# Patient Record
Sex: MALE | Race: WHITE | NOT HISPANIC OR LATINO | URBAN - METROPOLITAN AREA
[De-identification: names, ages, dates, MRNs, and addresses within clinical notes are randomized per-mention and may not be internally consistent; named-entity substitution may affect disease eponyms.]

---

## 2017-03-16 ENCOUNTER — OUTPATIENT (OUTPATIENT)
Dept: OUTPATIENT SERVICES | Facility: HOSPITAL | Age: 62
LOS: 1 days | Discharge: HOME | End: 2017-03-16

## 2017-03-28 ENCOUNTER — INPATIENT (INPATIENT)
Facility: HOSPITAL | Age: 62
LOS: 6 days | Discharge: NURSING, REHAB OR LONG-TERM CARE | End: 2017-04-04
Attending: THORACIC SURGERY (CARDIOTHORACIC VASCULAR SURGERY)

## 2017-04-21 ENCOUNTER — OUTPATIENT (OUTPATIENT)
Dept: OUTPATIENT SERVICES | Facility: HOSPITAL | Age: 62
LOS: 1 days | Discharge: HOME | End: 2017-04-21

## 2017-06-27 DIAGNOSIS — N18.6 END STAGE RENAL DISEASE: ICD-10-CM

## 2017-07-06 DIAGNOSIS — I25.10 ATHEROSCLEROTIC HEART DISEASE OF NATIVE CORONARY ARTERY WITHOUT ANGINA PECTORIS: ICD-10-CM

## 2017-07-06 DIAGNOSIS — Z01.818 ENCOUNTER FOR OTHER PREPROCEDURAL EXAMINATION: ICD-10-CM

## 2017-09-22 DIAGNOSIS — I27.2 OTHER SECONDARY PULMONARY HYPERTENSION: ICD-10-CM

## 2017-09-22 DIAGNOSIS — R00.0 TACHYCARDIA, UNSPECIFIED: ICD-10-CM

## 2017-09-22 DIAGNOSIS — Z86.73 PERSONAL HISTORY OF TRANSIENT ISCHEMIC ATTACK (TIA), AND CEREBRAL INFARCTION WITHOUT RESIDUAL DEFICITS: ICD-10-CM

## 2017-09-22 DIAGNOSIS — E21.3 HYPERPARATHYROIDISM, UNSPECIFIED: ICD-10-CM

## 2017-09-22 DIAGNOSIS — R73.9 HYPERGLYCEMIA, UNSPECIFIED: ICD-10-CM

## 2017-09-22 DIAGNOSIS — I95.9 HYPOTENSION, UNSPECIFIED: ICD-10-CM

## 2017-09-22 DIAGNOSIS — G47.33 OBSTRUCTIVE SLEEP APNEA (ADULT) (PEDIATRIC): ICD-10-CM

## 2017-09-22 DIAGNOSIS — I35.0 NONRHEUMATIC AORTIC (VALVE) STENOSIS: ICD-10-CM

## 2017-09-22 DIAGNOSIS — I07.1 RHEUMATIC TRICUSPID INSUFFICIENCY: ICD-10-CM

## 2017-09-22 DIAGNOSIS — N18.6 END STAGE RENAL DISEASE: ICD-10-CM

## 2017-09-22 DIAGNOSIS — D62 ACUTE POSTHEMORRHAGIC ANEMIA: ICD-10-CM

## 2017-09-22 DIAGNOSIS — E87.5 HYPERKALEMIA: ICD-10-CM

## 2017-09-22 DIAGNOSIS — R06.89 OTHER ABNORMALITIES OF BREATHING: ICD-10-CM

## 2017-09-22 DIAGNOSIS — I73.9 PERIPHERAL VASCULAR DISEASE, UNSPECIFIED: ICD-10-CM

## 2017-09-22 DIAGNOSIS — Z87.891 PERSONAL HISTORY OF NICOTINE DEPENDENCE: ICD-10-CM

## 2017-09-22 DIAGNOSIS — I34.0 NONRHEUMATIC MITRAL (VALVE) INSUFFICIENCY: ICD-10-CM

## 2017-09-22 DIAGNOSIS — M54.89 OTHER DORSALGIA: ICD-10-CM

## 2017-09-22 DIAGNOSIS — I12.0 HYPERTENSIVE CHRONIC KIDNEY DISEASE WITH STAGE 5 CHRONIC KIDNEY DISEASE OR END STAGE RENAL DISEASE: ICD-10-CM

## 2017-09-22 DIAGNOSIS — Z99.2 DEPENDENCE ON RENAL DIALYSIS: ICD-10-CM

## 2017-09-22 DIAGNOSIS — M19.90 UNSPECIFIED OSTEOARTHRITIS, UNSPECIFIED SITE: ICD-10-CM

## 2017-09-22 DIAGNOSIS — M25.519 PAIN IN UNSPECIFIED SHOULDER: ICD-10-CM

## 2017-09-22 DIAGNOSIS — E66.01 MORBID (SEVERE) OBESITY DUE TO EXCESS CALORIES: ICD-10-CM

## 2017-09-22 DIAGNOSIS — D63.1 ANEMIA IN CHRONIC KIDNEY DISEASE: ICD-10-CM

## 2017-12-19 PROBLEM — Z00.00 ENCOUNTER FOR PREVENTIVE HEALTH EXAMINATION: Status: ACTIVE | Noted: 2017-12-19

## 2018-01-10 ENCOUNTER — OUTPATIENT (OUTPATIENT)
Dept: OUTPATIENT SERVICES | Facility: HOSPITAL | Age: 63
LOS: 1 days | Discharge: HOME | End: 2018-01-10

## 2018-01-10 DIAGNOSIS — Z41.8 ENCOUNTER FOR OTHER PROCEDURES FOR PURPOSES OTHER THAN REMEDYING HEALTH STATE: ICD-10-CM

## 2018-01-10 DIAGNOSIS — I35.1 NONRHEUMATIC AORTIC (VALVE) INSUFFICIENCY: ICD-10-CM

## 2018-01-10 DIAGNOSIS — R78.81 BACTEREMIA: ICD-10-CM

## 2018-01-19 ENCOUNTER — OUTPATIENT (OUTPATIENT)
Dept: OUTPATIENT SERVICES | Facility: HOSPITAL | Age: 63
LOS: 1 days | Discharge: HOME | End: 2018-01-19

## 2018-01-19 DIAGNOSIS — I35.1 NONRHEUMATIC AORTIC (VALVE) INSUFFICIENCY: ICD-10-CM

## 2018-01-19 DIAGNOSIS — B95.61 METHICILLIN SUSCEPTIBLE STAPHYLOCOCCUS AUREUS INFECTION AS THE CAUSE OF DISEASES CLASSIFIED ELSEWHERE: ICD-10-CM

## 2018-03-02 ENCOUNTER — OUTPATIENT (OUTPATIENT)
Dept: OUTPATIENT SERVICES | Facility: HOSPITAL | Age: 63
LOS: 1 days | Discharge: HOME | End: 2018-03-02

## 2018-03-02 DIAGNOSIS — Z41.8 ENCOUNTER FOR OTHER PROCEDURES FOR PURPOSES OTHER THAN REMEDYING HEALTH STATE: ICD-10-CM

## 2018-03-02 DIAGNOSIS — L02.91 CUTANEOUS ABSCESS, UNSPECIFIED: ICD-10-CM

## 2018-03-07 ENCOUNTER — APPOINTMENT (OUTPATIENT)
Dept: PLASTIC SURGERY | Facility: CLINIC | Age: 63
End: 2018-03-07

## 2018-03-09 ENCOUNTER — OUTPATIENT (OUTPATIENT)
Dept: OUTPATIENT SERVICES | Facility: HOSPITAL | Age: 63
LOS: 1 days | Discharge: HOME | End: 2018-03-09

## 2018-03-09 DIAGNOSIS — R71.8 OTHER ABNORMALITY OF RED BLOOD CELLS: ICD-10-CM

## 2018-03-14 ENCOUNTER — OUTPATIENT (OUTPATIENT)
Dept: OUTPATIENT SERVICES | Facility: HOSPITAL | Age: 63
LOS: 1 days | Discharge: HOME | End: 2018-03-14

## 2018-03-14 DIAGNOSIS — Z41.8 ENCOUNTER FOR OTHER PROCEDURES FOR PURPOSES OTHER THAN REMEDYING HEALTH STATE: ICD-10-CM

## 2018-03-14 DIAGNOSIS — R78.81 BACTEREMIA: ICD-10-CM

## 2018-03-19 ENCOUNTER — OUTPATIENT (OUTPATIENT)
Dept: OUTPATIENT SERVICES | Facility: HOSPITAL | Age: 63
LOS: 1 days | Discharge: HOME | End: 2018-03-19

## 2018-03-19 DIAGNOSIS — B96.89 OTHER SPECIFIED BACTERIAL AGENTS AS THE CAUSE OF DISEASES CLASSIFIED ELSEWHERE: ICD-10-CM

## 2018-03-23 ENCOUNTER — OUTPATIENT (OUTPATIENT)
Dept: OUTPATIENT SERVICES | Facility: HOSPITAL | Age: 63
LOS: 1 days | Discharge: HOME | End: 2018-03-23

## 2018-03-23 DIAGNOSIS — R71.0 PRECIPITOUS DROP IN HEMATOCRIT: ICD-10-CM

## 2018-03-23 DIAGNOSIS — R53.83 OTHER FATIGUE: ICD-10-CM

## 2018-03-28 ENCOUNTER — OUTPATIENT (OUTPATIENT)
Dept: OUTPATIENT SERVICES | Facility: HOSPITAL | Age: 63
LOS: 1 days | Discharge: HOME | End: 2018-03-28

## 2018-03-29 DIAGNOSIS — B96.89 OTHER SPECIFIED BACTERIAL AGENTS AS THE CAUSE OF DISEASES CLASSIFIED ELSEWHERE: ICD-10-CM

## 2018-04-04 ENCOUNTER — OUTPATIENT (OUTPATIENT)
Dept: OUTPATIENT SERVICES | Facility: HOSPITAL | Age: 63
LOS: 1 days | Discharge: HOME | End: 2018-04-04

## 2018-04-04 DIAGNOSIS — B96.89 OTHER SPECIFIED BACTERIAL AGENTS AS THE CAUSE OF DISEASES CLASSIFIED ELSEWHERE: ICD-10-CM

## 2018-04-16 DIAGNOSIS — A49.9 BACTERIAL INFECTION, UNSPECIFIED: ICD-10-CM

## 2018-04-18 ENCOUNTER — OUTPATIENT (OUTPATIENT)
Dept: OUTPATIENT SERVICES | Facility: HOSPITAL | Age: 63
LOS: 1 days | Discharge: HOME | End: 2018-04-18

## 2018-04-18 DIAGNOSIS — N25.81 SECONDARY HYPERPARATHYROIDISM OF RENAL ORIGIN: ICD-10-CM

## 2018-04-25 ENCOUNTER — OUTPATIENT (OUTPATIENT)
Dept: OUTPATIENT SERVICES | Facility: HOSPITAL | Age: 63
LOS: 1 days | Discharge: HOME | End: 2018-04-25

## 2018-04-26 DIAGNOSIS — B96.89 OTHER SPECIFIED BACTERIAL AGENTS AS THE CAUSE OF DISEASES CLASSIFIED ELSEWHERE: ICD-10-CM

## 2018-05-16 ENCOUNTER — OUTPATIENT (OUTPATIENT)
Dept: OUTPATIENT SERVICES | Facility: HOSPITAL | Age: 63
LOS: 1 days | Discharge: HOME | End: 2018-05-16

## 2018-05-16 DIAGNOSIS — B96.89 OTHER SPECIFIED BACTERIAL AGENTS AS THE CAUSE OF DISEASES CLASSIFIED ELSEWHERE: ICD-10-CM

## 2018-05-30 ENCOUNTER — OUTPATIENT (OUTPATIENT)
Dept: OUTPATIENT SERVICES | Facility: HOSPITAL | Age: 63
LOS: 1 days | Discharge: HOME | End: 2018-05-30

## 2018-05-30 DIAGNOSIS — B96.89 OTHER SPECIFIED BACTERIAL AGENTS AS THE CAUSE OF DISEASES CLASSIFIED ELSEWHERE: ICD-10-CM

## 2018-08-17 ENCOUNTER — OUTPATIENT (OUTPATIENT)
Dept: OUTPATIENT SERVICES | Facility: HOSPITAL | Age: 63
LOS: 1 days | Discharge: HOME | End: 2018-08-17

## 2018-08-17 DIAGNOSIS — E87.5 HYPERKALEMIA: ICD-10-CM

## 2018-09-12 ENCOUNTER — OUTPATIENT (OUTPATIENT)
Dept: OUTPATIENT SERVICES | Facility: HOSPITAL | Age: 63
LOS: 1 days | Discharge: HOME | End: 2018-09-12

## 2018-09-12 DIAGNOSIS — T82.7XXA INFECTION AND INFLAMMATORY REACTION DUE TO OTHER CARDIAC AND VASCULAR DEVICES, IMPLANTS AND GRAFTS, INITIAL ENCOUNTER: ICD-10-CM

## 2018-11-09 ENCOUNTER — OUTPATIENT (OUTPATIENT)
Dept: OUTPATIENT SERVICES | Facility: HOSPITAL | Age: 63
LOS: 1 days | Discharge: HOME | End: 2018-11-09

## 2018-11-09 DIAGNOSIS — E87.5 HYPERKALEMIA: ICD-10-CM

## 2018-12-10 ENCOUNTER — OUTPATIENT (OUTPATIENT)
Dept: OUTPATIENT SERVICES | Facility: HOSPITAL | Age: 63
LOS: 1 days | Discharge: HOME | End: 2018-12-10

## 2018-12-11 DIAGNOSIS — T82.7XXA INFECTION AND INFLAMMATORY REACTION DUE TO OTHER CARDIAC AND VASCULAR DEVICES, IMPLANTS AND GRAFTS, INITIAL ENCOUNTER: ICD-10-CM

## 2018-12-26 ENCOUNTER — OUTPATIENT (OUTPATIENT)
Dept: OUTPATIENT SERVICES | Facility: HOSPITAL | Age: 63
LOS: 1 days | Discharge: HOME | End: 2018-12-26

## 2018-12-26 DIAGNOSIS — L02.213 CUTANEOUS ABSCESS OF CHEST WALL: ICD-10-CM

## 2019-01-02 ENCOUNTER — OUTPATIENT (OUTPATIENT)
Dept: OUTPATIENT SERVICES | Facility: HOSPITAL | Age: 64
LOS: 1 days | Discharge: HOME | End: 2019-01-02

## 2019-01-02 DIAGNOSIS — L02.213 CUTANEOUS ABSCESS OF CHEST WALL: ICD-10-CM

## 2019-02-01 ENCOUNTER — OUTPATIENT (OUTPATIENT)
Dept: OUTPATIENT SERVICES | Facility: HOSPITAL | Age: 64
LOS: 1 days | Discharge: HOME | End: 2019-02-01

## 2019-02-04 DIAGNOSIS — D64.9 ANEMIA, UNSPECIFIED: ICD-10-CM

## 2019-02-13 ENCOUNTER — OUTPATIENT (OUTPATIENT)
Dept: OUTPATIENT SERVICES | Facility: HOSPITAL | Age: 64
LOS: 1 days | Discharge: HOME | End: 2019-02-13

## 2019-02-13 DIAGNOSIS — L02.213 CUTANEOUS ABSCESS OF CHEST WALL: ICD-10-CM

## 2019-02-14 LAB — GENTAMICIN SERPL-MCNC: 3.9 — SIGNIFICANT CHANGE UP

## 2019-02-18 ENCOUNTER — OUTPATIENT (OUTPATIENT)
Dept: OUTPATIENT SERVICES | Facility: HOSPITAL | Age: 64
LOS: 1 days | Discharge: HOME | End: 2019-02-18

## 2019-02-18 DIAGNOSIS — E71.41 PRIMARY CARNITINE DEFICIENCY: ICD-10-CM

## 2019-02-19 ENCOUNTER — APPOINTMENT (OUTPATIENT)
Dept: CARDIOLOGY | Facility: CLINIC | Age: 64
End: 2019-02-19
Payer: MEDICARE

## 2019-02-19 VITALS
WEIGHT: 215 LBS | HEIGHT: 73 IN | DIASTOLIC BLOOD PRESSURE: 75 MMHG | BODY MASS INDEX: 28.49 KG/M2 | SYSTOLIC BLOOD PRESSURE: 130 MMHG

## 2019-02-19 DIAGNOSIS — N18.4 CHRONIC KIDNEY DISEASE, STAGE 4 (SEVERE): ICD-10-CM

## 2019-02-19 DIAGNOSIS — Z87.891 PERSONAL HISTORY OF NICOTINE DEPENDENCE: ICD-10-CM

## 2019-02-19 DIAGNOSIS — I38 ENDOCARDITIS, VALVE UNSPECIFIED: ICD-10-CM

## 2019-02-19 DIAGNOSIS — I10 ESSENTIAL (PRIMARY) HYPERTENSION: ICD-10-CM

## 2019-02-19 DIAGNOSIS — Z95.2 PRESENCE OF PROSTHETIC HEART VALVE: ICD-10-CM

## 2019-02-19 DIAGNOSIS — Z78.9 OTHER SPECIFIED HEALTH STATUS: ICD-10-CM

## 2019-02-19 DIAGNOSIS — I50.30 ENDOCARDITIS, VALVE UNSPECIFIED: ICD-10-CM

## 2019-02-19 PROCEDURE — 99213 OFFICE O/P EST LOW 20 MIN: CPT

## 2019-02-19 NOTE — ASSESSMENT
[FreeTextEntry1] : pt has mild chf but he is on hemodialysis\par  will get echo to check aortic valve function and lvef

## 2019-02-19 NOTE — REASON FOR VISIT
[Follow-Up - Clinic] : a clinic follow-up of [FreeTextEntry1] : c/o exetional dyspnoea\par  hx of bioprosthetic aortic valve 2 yrs ago\par  pt had  av fistula was infected on abx\par  daysi was neg for valve infectio\par

## 2019-02-20 ENCOUNTER — OUTPATIENT (OUTPATIENT)
Dept: OUTPATIENT SERVICES | Facility: HOSPITAL | Age: 64
LOS: 1 days | Discharge: HOME | End: 2019-02-20

## 2019-02-20 DIAGNOSIS — L02.213 CUTANEOUS ABSCESS OF CHEST WALL: ICD-10-CM

## 2019-02-25 ENCOUNTER — OUTPATIENT (OUTPATIENT)
Dept: OUTPATIENT SERVICES | Facility: HOSPITAL | Age: 64
LOS: 1 days | Discharge: HOME | End: 2019-02-25

## 2019-02-25 DIAGNOSIS — L02.213 CUTANEOUS ABSCESS OF CHEST WALL: ICD-10-CM

## 2019-02-27 ENCOUNTER — OUTPATIENT (OUTPATIENT)
Dept: OUTPATIENT SERVICES | Facility: HOSPITAL | Age: 64
LOS: 1 days | Discharge: HOME | End: 2019-02-27

## 2019-02-27 DIAGNOSIS — L02.213 CUTANEOUS ABSCESS OF CHEST WALL: ICD-10-CM

## 2019-03-01 ENCOUNTER — INPATIENT (INPATIENT)
Facility: HOSPITAL | Age: 64
LOS: 7 days | End: 2019-03-09
Attending: INTERNAL MEDICINE | Admitting: INTERNAL MEDICINE

## 2019-03-01 VITALS
SYSTOLIC BLOOD PRESSURE: 103 MMHG | HEIGHT: 73 IN | TEMPERATURE: 97 F | HEART RATE: 96 BPM | RESPIRATION RATE: 20 BRPM | WEIGHT: 214.95 LBS | DIASTOLIC BLOOD PRESSURE: 64 MMHG | OXYGEN SATURATION: 94 %

## 2019-03-01 DIAGNOSIS — R65.21 SEVERE SEPSIS WITH SEPTIC SHOCK: ICD-10-CM

## 2019-03-01 DIAGNOSIS — N25.81 SECONDARY HYPERPARATHYROIDISM OF RENAL ORIGIN: ICD-10-CM

## 2019-03-01 DIAGNOSIS — I95.9 HYPOTENSION, UNSPECIFIED: ICD-10-CM

## 2019-03-01 DIAGNOSIS — E87.70 FLUID OVERLOAD, UNSPECIFIED: ICD-10-CM

## 2019-03-01 DIAGNOSIS — Z88.0 ALLERGY STATUS TO PENICILLIN: ICD-10-CM

## 2019-03-01 DIAGNOSIS — Z95.2 PRESENCE OF PROSTHETIC HEART VALVE: ICD-10-CM

## 2019-03-01 DIAGNOSIS — J90 PLEURAL EFFUSION, NOT ELSEWHERE CLASSIFIED: ICD-10-CM

## 2019-03-01 DIAGNOSIS — A41.9 SEPSIS, UNSPECIFIED ORGANISM: ICD-10-CM

## 2019-03-01 DIAGNOSIS — Z78.1 PHYSICAL RESTRAINT STATUS: ICD-10-CM

## 2019-03-01 DIAGNOSIS — E87.5 HYPERKALEMIA: ICD-10-CM

## 2019-03-01 DIAGNOSIS — Z95.2 PRESENCE OF PROSTHETIC HEART VALVE: Chronic | ICD-10-CM

## 2019-03-01 DIAGNOSIS — R74.8 ABNORMAL LEVELS OF OTHER SERUM ENZYMES: ICD-10-CM

## 2019-03-01 DIAGNOSIS — J96.02 ACUTE RESPIRATORY FAILURE WITH HYPERCAPNIA: ICD-10-CM

## 2019-03-01 DIAGNOSIS — R06.02 SHORTNESS OF BREATH: ICD-10-CM

## 2019-03-01 DIAGNOSIS — Z99.2 DEPENDENCE ON RENAL DIALYSIS: ICD-10-CM

## 2019-03-01 DIAGNOSIS — D53.9 NUTRITIONAL ANEMIA, UNSPECIFIED: ICD-10-CM

## 2019-03-01 DIAGNOSIS — N18.6 END STAGE RENAL DISEASE: ICD-10-CM

## 2019-03-01 LAB
ALBUMIN SERPL ELPH-MCNC: 3.6 G/DL — SIGNIFICANT CHANGE UP (ref 3.5–5.2)
ALP SERPL-CCNC: 98 U/L — SIGNIFICANT CHANGE UP (ref 30–115)
ALT FLD-CCNC: 6 U/L — SIGNIFICANT CHANGE UP (ref 0–41)
ANION GAP SERPL CALC-SCNC: 13 MMOL/L — SIGNIFICANT CHANGE UP (ref 7–14)
APTT BLD: 41.1 SEC — HIGH (ref 27–39.2)
AST SERPL-CCNC: 16 U/L — SIGNIFICANT CHANGE UP (ref 0–41)
BASE EXCESS BLDV CALC-SCNC: -4.1 MMOL/L — LOW (ref -2–2)
BASOPHILS # BLD AUTO: 0.03 K/UL — SIGNIFICANT CHANGE UP (ref 0–0.2)
BASOPHILS NFR BLD AUTO: 0.5 % — SIGNIFICANT CHANGE UP (ref 0–1)
BILIRUB SERPL-MCNC: 0.4 MG/DL — SIGNIFICANT CHANGE UP (ref 0.2–1.2)
BUN SERPL-MCNC: 17 MG/DL — SIGNIFICANT CHANGE UP (ref 10–20)
CA-I SERPL-SCNC: 1.41 MMOL/L — HIGH (ref 1.12–1.3)
CALCIUM SERPL-MCNC: 10.5 MG/DL — HIGH (ref 8.5–10.1)
CHLORIDE SERPL-SCNC: 97 MMOL/L — LOW (ref 98–110)
CO2 SERPL-SCNC: 25 MMOL/L — SIGNIFICANT CHANGE UP (ref 17–32)
CREAT SERPL-MCNC: 5.9 MG/DL — CRITICAL HIGH (ref 0.7–1.5)
EOSINOPHIL # BLD AUTO: 0.22 K/UL — SIGNIFICANT CHANGE UP (ref 0–0.7)
EOSINOPHIL NFR BLD AUTO: 3.3 % — SIGNIFICANT CHANGE UP (ref 0–8)
GAS PNL BLDV: 135 MMOL/L — LOW (ref 136–145)
GAS PNL BLDV: SIGNIFICANT CHANGE UP
GLUCOSE SERPL-MCNC: 99 MG/DL — SIGNIFICANT CHANGE UP (ref 70–99)
HCO3 BLDV-SCNC: 26 MMOL/L — SIGNIFICANT CHANGE UP (ref 22–29)
HCT VFR BLD CALC: 45.8 % — SIGNIFICANT CHANGE UP (ref 42–52)
HCT VFR BLDA CALC: 44.4 % — HIGH (ref 34–44)
HGB BLD CALC-MCNC: 14.5 G/DL — SIGNIFICANT CHANGE UP (ref 14–18)
HGB BLD-MCNC: 13.8 G/DL — LOW (ref 14–18)
HOROWITZ INDEX BLDV+IHG-RTO: 21 — SIGNIFICANT CHANGE UP
IMM GRANULOCYTES NFR BLD AUTO: 0.2 % — SIGNIFICANT CHANGE UP (ref 0.1–0.3)
INR BLD: 1.16 RATIO — SIGNIFICANT CHANGE UP (ref 0.65–1.3)
LACTATE BLDV-MCNC: 0.8 MMOL/L — SIGNIFICANT CHANGE UP (ref 0.5–1.6)
LYMPHOCYTES # BLD AUTO: 1.19 K/UL — LOW (ref 1.2–3.4)
LYMPHOCYTES # BLD AUTO: 17.9 % — LOW (ref 20.5–51.1)
MCHC RBC-ENTMCNC: 30.1 G/DL — LOW (ref 32–37)
MCHC RBC-ENTMCNC: 30.3 PG — SIGNIFICANT CHANGE UP (ref 27–31)
MCV RBC AUTO: 100.4 FL — HIGH (ref 80–94)
MONOCYTES # BLD AUTO: 0.76 K/UL — HIGH (ref 0.1–0.6)
MONOCYTES NFR BLD AUTO: 11.4 % — HIGH (ref 1.7–9.3)
NEUTROPHILS # BLD AUTO: 4.45 K/UL — SIGNIFICANT CHANGE UP (ref 1.4–6.5)
NEUTROPHILS NFR BLD AUTO: 66.7 % — SIGNIFICANT CHANGE UP (ref 42.2–75.2)
NRBC # BLD: 0 /100 WBCS — SIGNIFICANT CHANGE UP (ref 0–0)
NT-PROBNP SERPL-SCNC: HIGH PG/ML (ref 0–300)
PCO2 BLDV: 69 MMHG — HIGH (ref 41–51)
PH BLDV: 7.18 — LOW (ref 7.26–7.43)
PLATELET # BLD AUTO: 127 K/UL — LOW (ref 130–400)
PO2 BLDV: 50 MMHG — HIGH (ref 20–40)
POTASSIUM BLDV-SCNC: 3.6 MMOL/L — SIGNIFICANT CHANGE UP (ref 3.3–5.6)
POTASSIUM SERPL-MCNC: 4.3 MMOL/L — SIGNIFICANT CHANGE UP (ref 3.5–5)
POTASSIUM SERPL-SCNC: 4.3 MMOL/L — SIGNIFICANT CHANGE UP (ref 3.5–5)
PROT SERPL-MCNC: 6.5 G/DL — SIGNIFICANT CHANGE UP (ref 6–8)
PROTHROM AB SERPL-ACNC: 13.3 SEC — HIGH (ref 9.95–12.87)
RBC # BLD: 4.56 M/UL — LOW (ref 4.7–6.1)
RBC # FLD: 16 % — HIGH (ref 11.5–14.5)
SAO2 % BLDV: 79 % — SIGNIFICANT CHANGE UP
SODIUM SERPL-SCNC: 135 MMOL/L — SIGNIFICANT CHANGE UP (ref 135–146)
TROPONIN T SERPL-MCNC: 0.09 NG/ML — CRITICAL HIGH
WBC # BLD: 6.66 K/UL — SIGNIFICANT CHANGE UP (ref 4.8–10.8)
WBC # FLD AUTO: 6.66 K/UL — SIGNIFICANT CHANGE UP (ref 4.8–10.8)

## 2019-03-01 RX ORDER — SODIUM CHLORIDE 9 MG/ML
500 INJECTION INTRAMUSCULAR; INTRAVENOUS; SUBCUTANEOUS ONCE
Qty: 0 | Refills: 0 | Status: COMPLETED | OUTPATIENT
Start: 2019-03-01 | End: 2019-03-01

## 2019-03-01 RX ORDER — CHLORHEXIDINE GLUCONATE 213 G/1000ML
1 SOLUTION TOPICAL
Qty: 0 | Refills: 0 | Status: DISCONTINUED | OUTPATIENT
Start: 2019-03-01 | End: 2019-03-09

## 2019-03-01 RX ORDER — HEPARIN SODIUM 5000 [USP'U]/ML
5000 INJECTION INTRAVENOUS; SUBCUTANEOUS EVERY 12 HOURS
Qty: 0 | Refills: 0 | Status: DISCONTINUED | OUTPATIENT
Start: 2019-03-01 | End: 2019-03-09

## 2019-03-01 RX ORDER — OXYCODONE HYDROCHLORIDE 5 MG/1
0 TABLET ORAL
Qty: 0 | Refills: 0 | COMMUNITY

## 2019-03-01 RX ORDER — OXYCODONE HYDROCHLORIDE 5 MG/1
15 TABLET ORAL
Qty: 0 | Refills: 0 | Status: DISCONTINUED | OUTPATIENT
Start: 2019-03-01 | End: 2019-03-01

## 2019-03-01 RX ADMIN — SODIUM CHLORIDE 500 MILLILITER(S): 9 INJECTION INTRAMUSCULAR; INTRAVENOUS; SUBCUTANEOUS at 22:05

## 2019-03-01 RX ADMIN — OXYCODONE HYDROCHLORIDE 15 MILLIGRAM(S): 5 TABLET ORAL at 21:59

## 2019-03-01 RX ADMIN — OXYCODONE HYDROCHLORIDE 15 MILLIGRAM(S): 5 TABLET ORAL at 21:30

## 2019-03-01 RX ADMIN — SODIUM CHLORIDE 1000 MILLILITER(S): 9 INJECTION INTRAMUSCULAR; INTRAVENOUS; SUBCUTANEOUS at 18:35

## 2019-03-01 NOTE — ED ADULT NURSE NOTE - NSIMPLEMENTINTERV_GEN_ALL_ED
Implemented All Fall with Harm Risk Interventions:  [place] to call system. Call bell, personal items and telephone within reach. Instruct patient to call for assistance. Room bathroom lighting operational. Non-slip footwear when patient is off stretcher. Physically safe environment: no spills, clutter or unnecessary equipment. Stretcher in lowest position, wheels locked, appropriate side rails in place. Provide visual cue, wrist band, yellow gown, etc. Monitor gait and stability. Monitor for mental status changes and reorient to person, place, and time. Review medications for side effects contributing to fall risk. Reinforce activity limits and safety measures with patient and family. Provide visual clues: red socks.

## 2019-03-01 NOTE — ED PROVIDER NOTE - CLINICAL SUMMARY MEDICAL DECISION MAKING FREE TEXT BOX
Pt presenting with shortness of breath that began shortly after HD two days PTA. Pt afebrile in ED. relative hypotension responded to fluid bolus trial. Fluid overload on XR. Additional fluids held. Troponin elevated and may be secondary to demand. BNP elevated consistent with CXR though likely also elevated at baseline. Pt does not make urine. Renal aware and plan for urgent dialysis. Pt comfortable on nasal canula. Admitted to telemetry. ABX held for afebrile patient with fluid overload as explanation for presenting symptoms.

## 2019-03-01 NOTE — ED PROVIDER NOTE - OBJECTIVE STATEMENT
63 year old M with pmhx of ESRD on HD M/W/F, AVR not on AC c/o generalized weakness and sob x 3 days. Pt sts he did not go to dialysis today because he felt too weak. Sob is exertional and improved with rest. Denies any cough, fever/chills, worsening leg swelling, chest pain, abdominal pain, n/v, diarrhea. Pts nephrologist is Dr. Davalos. Cardiologist: Dr. Montano.

## 2019-03-01 NOTE — H&P ADULT - NSHPLABSRESULTS_GEN_ALL_CORE
13.8   6.66  )-----------( 127      ( 01 Mar 2019 18:10 )             45.8     03-01    135  |  97<L>  |  17  ----------------------------<  99  4.3   |  25  |  5.9<HH>    Ca    10.5<H>      01 Mar 2019 18:10    TPro  6.5  /  Alb  3.6  /  TBili  0.4  /  DBili  x   /  AST  16  /  ALT  6   /  AlkPhos  98  03-01            PT/INR - ( 01 Mar 2019 20:58 )   PT: 13.30 sec;   INR: 1.16 ratio         PTT - ( 01 Mar 2019 20:58 )  PTT:41.1 sec  Lactate Trend    CARDIAC MARKERS ( 01 Mar 2019 18:10 )  x     / 0.09 ng/mL / x     / x     / x          CAPILLARY BLOOD GLUCOSE    CXR to me shows fluid overload    EKG - sinus, PVC, LAD, LVH, inferior infarct with age undetermined

## 2019-03-01 NOTE — H&P ADULT - HISTORY OF PRESENT ILLNESS
[age]yo male presents to the ER due to worsening shortness of breath. Notes that he was too sick to go for his scheduled dialysis today. He was hypotensive in the ER but improved with IV fluids. Denies fevers but reports he had been IV antibiotics for 1 year previously for infection involving left upper extremity Adds that he gets side effects from penicillin derivatives 64yo male presents to the ER due to worsening shortness of breath. Notes that he was too sick to go for his scheduled dialysis today. States he does not make urine. Patient was hypotensive in the ER but improved with IV fluids. Denies fevers but reports he had been IV antibiotics for 1 year previously for infection involving left upper extremity Adds that he gets side effects from penicillin derivatives

## 2019-03-01 NOTE — ED PROVIDER NOTE - NS ED ROS FT
Constitutional: no fever, chills, no recent weight loss, change in appetite or malaise  ENT: no rhinorrhea/ear pain/sore throat  Cardiac: No chest pain or edema.  Respiratory: + exertional sob. No cough or respiratory distress  GI: No nausea, vomiting, diarrhea or abdominal pain.  : No dysuria, frequency, urgency or hematuria  MS: no pain to back or extremities, no loss of ROM, no weakness  Extrem: no calf pain  Neuro: No headache or weakness. No LOC.  Skin: No skin rash.  Endocrine: No history of thyroid disease or diabetes.  Except as documented in the HPI, all other systems are negative.

## 2019-03-01 NOTE — ED PROVIDER NOTE - PHYSICAL EXAMINATION
CONSTITUTIONAL: Well-appearing; well-nourished; in no apparent distress.   NECK: Supple; non-tender; no cervical lymphadenopathy. No JVD.   CARDIOVASCULAR: Normal S1, S2; no murmurs, rubs, or gallops. Equal radial pulses  CHEST: no chest wall tenderness  RESPIRATORY: No respiratory distress. No tachypnea. Pt speaking full sentences. + crackles at rt base  GI/: Normal bowel sounds; non-distended; non-tender; no palpable organomegaly.   MS: No evidence of trauma or deformity Normal ROM in all four extremities; non-tender to palpation; distal pulses are normal.   Extrem: mild b/l non pitting edema (pt sts chronic)  SKIN: Normal for age and race; warm; dry; good turgor; no apparent lesions or exudate.   NEURO/PSYCH: A & O x 4; grossly unremarkable. mood and manner are appropriate. Grooming and personal hygiene are appropriate.

## 2019-03-01 NOTE — ED PROVIDER NOTE - ATTENDING CONTRIBUTION TO CARE
63M hx of ESRD on MWF last HD on W presenting with several days of generalized weakness, and progressive shortness of breath. no angina. +orthopnea. No fever or chills. Does not make urine. recently off abx for infected hd access.   VITAL SIGNS: noted  CONSTITUTIONAL: Well-developed; well-nourished; in no acute distress  HEAD: Normocephalic; atraumatic  EYES: conjunctiva and sclera clear  ENT: No nasal discharge; airway clear. MMM  NECK: Supple; non tender. No anterior cervical lymphadenopathy noted  CARD: Regular rate and rhythm  RESP: bl wheeze and rhonchi, hypoxic on ra. comfortable on non-compliant.   ABD: Normal bowel sounds; soft; non-distended; non-tender; No CVAT  EXT: Normal ROM. No calf tenderness or edema. Distal pulses intact  NEURO: Alert, oriented. Grossly unremarkable. No focal deficits  SKIN: Skin exam is warm and dry, no acute rash.   SOB - APE vs ACS vs PNA - labs, ekg, xr, prn 0-2

## 2019-03-01 NOTE — H&P ADULT - PROBLEM SELECTOR PLAN 2
suspect due to renal disease but patient has risk factors, repeat orderd suspect due to renal disease but patient has risk factors, repeat ordered and will ask cardiology to see suspect due to renal disease but patient has risk factors, repeat ordered and will ask cardiology to see (ALSO HAS AVR)

## 2019-03-01 NOTE — ED PROVIDER NOTE - CARE PLAN
Principal Discharge DX:	SOB (shortness of breath)  Secondary Diagnosis:	Weakness  Secondary Diagnosis:	Elevated troponin

## 2019-03-01 NOTE — H&P ADULT - PROBLEM SELECTOR PLAN 3
Patient does use midodrine but concerned that there may be underlying infectious process (see HPI). Will give vancomycin and gent (used recently) and ask ID to see

## 2019-03-01 NOTE — ED PEDIATRIC NURSE NOTE - NSIMPLEMENTINTERV_GEN_ALL_ED
Implemented All Universal Safety Interventions:  [place] to call system. Call bell, personal items and telephone within reach. Instruct patient to call for assistance. Room bathroom lighting operational. Non-slip footwear when patient is off stretcher. Physically safe environment: no spills, clutter or unnecessary equipment. Stretcher in lowest position, wheels locked, appropriate side rails in place.

## 2019-03-01 NOTE — H&P ADULT - EXTREMITIES COMMENTS
abnormal skin changes to both feet abnormal skin changes to both feet, scar over left shoulder area abnormal darkened skin changes to both feet with abnormal nail growth, scar over left shoulder area, small ulcer to buttock

## 2019-03-01 NOTE — ED ADULT NURSE REASSESSMENT NOTE - NS ED NURSE REASSESS COMMENT FT1
IV started as per pt request in right wrist.  Pt states he has a non working fistula in right upper arm that is going to be removed at the end of the month.

## 2019-03-02 DIAGNOSIS — R74.8 ABNORMAL LEVELS OF OTHER SERUM ENZYMES: ICD-10-CM

## 2019-03-02 DIAGNOSIS — L30.9 DERMATITIS, UNSPECIFIED: ICD-10-CM

## 2019-03-02 DIAGNOSIS — R06.02 SHORTNESS OF BREATH: ICD-10-CM

## 2019-03-02 DIAGNOSIS — I95.9 HYPOTENSION, UNSPECIFIED: ICD-10-CM

## 2019-03-02 DIAGNOSIS — N18.6 END STAGE RENAL DISEASE: ICD-10-CM

## 2019-03-02 LAB
ANION GAP SERPL CALC-SCNC: 14 MMOL/L — SIGNIFICANT CHANGE UP (ref 7–14)
ANION GAP SERPL CALC-SCNC: 3 MMOL/L — LOW (ref 7–14)
BASOPHILS # BLD AUTO: 0.03 K/UL — SIGNIFICANT CHANGE UP (ref 0–0.2)
BASOPHILS NFR BLD AUTO: 0.4 % — SIGNIFICANT CHANGE UP (ref 0–1)
BUN SERPL-MCNC: 13 MG/DL — SIGNIFICANT CHANGE UP (ref 10–20)
BUN SERPL-MCNC: 20 MG/DL — SIGNIFICANT CHANGE UP (ref 10–20)
CALCIUM SERPL-MCNC: 10 MG/DL — SIGNIFICANT CHANGE UP (ref 8.5–10.1)
CALCIUM SERPL-MCNC: 10.3 MG/DL — HIGH (ref 8.5–10.1)
CHLORIDE SERPL-SCNC: 97 MMOL/L — LOW (ref 98–110)
CHLORIDE SERPL-SCNC: 99 MMOL/L — SIGNIFICANT CHANGE UP (ref 98–110)
CK MB CFR SERPL CALC: 3.5 NG/ML — SIGNIFICANT CHANGE UP (ref 0.6–6.3)
CK MB CFR SERPL CALC: 3.8 NG/ML — SIGNIFICANT CHANGE UP (ref 0.6–6.3)
CK SERPL-CCNC: 19 U/L — SIGNIFICANT CHANGE UP (ref 0–225)
CK SERPL-CCNC: 23 U/L — SIGNIFICANT CHANGE UP (ref 0–225)
CO2 SERPL-SCNC: 27 MMOL/L — SIGNIFICANT CHANGE UP (ref 17–32)
CO2 SERPL-SCNC: 34 MMOL/L — HIGH (ref 17–32)
CREAT SERPL-MCNC: 5.1 MG/DL — CRITICAL HIGH (ref 0.7–1.5)
CREAT SERPL-MCNC: 6.3 MG/DL — CRITICAL HIGH (ref 0.7–1.5)
EOSINOPHIL # BLD AUTO: 0.13 K/UL — SIGNIFICANT CHANGE UP (ref 0–0.7)
EOSINOPHIL NFR BLD AUTO: 1.8 % — SIGNIFICANT CHANGE UP (ref 0–8)
GLUCOSE SERPL-MCNC: 113 MG/DL — HIGH (ref 70–99)
GLUCOSE SERPL-MCNC: 90 MG/DL — SIGNIFICANT CHANGE UP (ref 70–99)
HCT VFR BLD CALC: 44.7 % — SIGNIFICANT CHANGE UP (ref 42–52)
HCT VFR BLD CALC: 48.9 % — SIGNIFICANT CHANGE UP (ref 42–52)
HGB BLD-MCNC: 13.4 G/DL — LOW (ref 14–18)
HGB BLD-MCNC: 14.6 G/DL — SIGNIFICANT CHANGE UP (ref 14–18)
IMM GRANULOCYTES NFR BLD AUTO: 0.8 % — HIGH (ref 0.1–0.3)
LYMPHOCYTES # BLD AUTO: 0.76 K/UL — LOW (ref 1.2–3.4)
LYMPHOCYTES # BLD AUTO: 10.7 % — LOW (ref 20.5–51.1)
MCHC RBC-ENTMCNC: 29.9 G/DL — LOW (ref 32–37)
MCHC RBC-ENTMCNC: 30 G/DL — LOW (ref 32–37)
MCHC RBC-ENTMCNC: 30.5 PG — SIGNIFICANT CHANGE UP (ref 27–31)
MCHC RBC-ENTMCNC: 30.6 PG — SIGNIFICANT CHANGE UP (ref 27–31)
MCV RBC AUTO: 101.6 FL — HIGH (ref 80–94)
MCV RBC AUTO: 102.5 FL — HIGH (ref 80–94)
MONOCYTES # BLD AUTO: 0.79 K/UL — HIGH (ref 0.1–0.6)
MONOCYTES NFR BLD AUTO: 11.2 % — HIGH (ref 1.7–9.3)
NEUTROPHILS # BLD AUTO: 5.31 K/UL — SIGNIFICANT CHANGE UP (ref 1.4–6.5)
NEUTROPHILS NFR BLD AUTO: 75.1 % — SIGNIFICANT CHANGE UP (ref 42.2–75.2)
NRBC # BLD: 0 /100 WBCS — SIGNIFICANT CHANGE UP (ref 0–0)
NRBC # BLD: 0 /100 WBCS — SIGNIFICANT CHANGE UP (ref 0–0)
PHOSPHATE SERPL-MCNC: 8.1 MG/DL — HIGH (ref 2.1–4.9)
PLATELET # BLD AUTO: 124 K/UL — LOW (ref 130–400)
PLATELET # BLD AUTO: 141 K/UL — SIGNIFICANT CHANGE UP (ref 130–400)
POTASSIUM SERPL-MCNC: 3.9 MMOL/L — SIGNIFICANT CHANGE UP (ref 3.5–5)
POTASSIUM SERPL-MCNC: 4.2 MMOL/L — SIGNIFICANT CHANGE UP (ref 3.5–5)
POTASSIUM SERPL-SCNC: 3.9 MMOL/L — SIGNIFICANT CHANGE UP (ref 3.5–5)
POTASSIUM SERPL-SCNC: 4.2 MMOL/L — SIGNIFICANT CHANGE UP (ref 3.5–5)
RBC # BLD: 4.4 M/UL — LOW (ref 4.7–6.1)
RBC # BLD: 4.77 M/UL — SIGNIFICANT CHANGE UP (ref 4.7–6.1)
RBC # FLD: 16.5 % — HIGH (ref 11.5–14.5)
RBC # FLD: 16.6 % — HIGH (ref 11.5–14.5)
SODIUM SERPL-SCNC: 134 MMOL/L — LOW (ref 135–146)
SODIUM SERPL-SCNC: 140 MMOL/L — SIGNIFICANT CHANGE UP (ref 135–146)
TROPONIN T SERPL-MCNC: 0.08 NG/ML — CRITICAL HIGH
TROPONIN T SERPL-MCNC: 0.09 NG/ML — CRITICAL HIGH
TROPONIN T SERPL-MCNC: 0.1 NG/ML — CRITICAL HIGH
WBC # BLD: 11.38 K/UL — HIGH (ref 4.8–10.8)
WBC # BLD: 7.08 K/UL — SIGNIFICANT CHANGE UP (ref 4.8–10.8)
WBC # FLD AUTO: 11.38 K/UL — HIGH (ref 4.8–10.8)
WBC # FLD AUTO: 7.08 K/UL — SIGNIFICANT CHANGE UP (ref 4.8–10.8)

## 2019-03-02 RX ORDER — VANCOMYCIN HCL 1 G
1000 VIAL (EA) INTRAVENOUS ONCE
Qty: 0 | Refills: 0 | Status: COMPLETED | OUTPATIENT
Start: 2019-03-02 | End: 2019-03-02

## 2019-03-02 RX ORDER — MIDODRINE HYDROCHLORIDE 2.5 MG/1
10 TABLET ORAL ONCE
Qty: 0 | Refills: 0 | Status: COMPLETED | OUTPATIENT
Start: 2019-03-02 | End: 2019-03-02

## 2019-03-02 RX ORDER — MEROPENEM 1 G/30ML
1000 INJECTION INTRAVENOUS ONCE
Qty: 0 | Refills: 0 | Status: DISCONTINUED | OUTPATIENT
Start: 2019-03-02 | End: 2019-03-02

## 2019-03-02 RX ORDER — CALCIUM CARBONATE 500(1250)
1 TABLET ORAL THREE TIMES A DAY
Qty: 0 | Refills: 0 | Status: DISCONTINUED | OUTPATIENT
Start: 2019-03-02 | End: 2019-03-09

## 2019-03-02 RX ORDER — CALCIUM CARBONATE 500(1250)
500 TABLET ORAL THREE TIMES A DAY
Qty: 0 | Refills: 0 | Status: DISCONTINUED | OUTPATIENT
Start: 2019-03-02 | End: 2019-03-02

## 2019-03-02 RX ORDER — NOREPINEPHRINE BITARTRATE/D5W 8 MG/250ML
0.05 PLASTIC BAG, INJECTION (ML) INTRAVENOUS
Qty: 16 | Refills: 0 | Status: DISCONTINUED | OUTPATIENT
Start: 2019-03-02 | End: 2019-03-09

## 2019-03-02 RX ORDER — MIDODRINE HYDROCHLORIDE 2.5 MG/1
10 TABLET ORAL THREE TIMES A DAY
Qty: 0 | Refills: 0 | Status: DISCONTINUED | OUTPATIENT
Start: 2019-03-02 | End: 2019-03-09

## 2019-03-02 RX ORDER — DOPAMINE HYDROCHLORIDE 40 MG/ML
5 INJECTION, SOLUTION, CONCENTRATE INTRAVENOUS
Qty: 400 | Refills: 0 | Status: DISCONTINUED | OUTPATIENT
Start: 2019-03-02 | End: 2019-03-02

## 2019-03-02 RX ORDER — GENTAMICIN SULFATE 40 MG/ML
100 VIAL (ML) INJECTION ONCE
Qty: 0 | Refills: 0 | Status: COMPLETED | OUTPATIENT
Start: 2019-03-02 | End: 2019-03-02

## 2019-03-02 RX ORDER — GENTAMICIN SULFATE 40 MG/ML
100 VIAL (ML) INJECTION ONCE
Qty: 0 | Refills: 0 | Status: DISCONTINUED | OUTPATIENT
Start: 2019-03-02 | End: 2019-03-02

## 2019-03-02 RX ORDER — ACETAMINOPHEN 500 MG
650 TABLET ORAL EVERY 6 HOURS
Qty: 0 | Refills: 0 | Status: DISCONTINUED | OUTPATIENT
Start: 2019-03-02 | End: 2019-03-06

## 2019-03-02 RX ORDER — PANTOPRAZOLE SODIUM 20 MG/1
40 TABLET, DELAYED RELEASE ORAL
Qty: 0 | Refills: 0 | Status: DISCONTINUED | OUTPATIENT
Start: 2019-03-02 | End: 2019-03-04

## 2019-03-02 RX ADMIN — MIDODRINE HYDROCHLORIDE 10 MILLIGRAM(S): 2.5 TABLET ORAL at 05:20

## 2019-03-02 RX ADMIN — Medication 1 TABLET(S): at 15:02

## 2019-03-02 RX ADMIN — Medication 4.67 MICROGRAM(S)/KG/MIN: at 12:40

## 2019-03-02 RX ADMIN — Medication 250 MILLIGRAM(S): at 01:54

## 2019-03-02 RX ADMIN — MIDODRINE HYDROCHLORIDE 10 MILLIGRAM(S): 2.5 TABLET ORAL at 15:02

## 2019-03-02 RX ADMIN — MIDODRINE HYDROCHLORIDE 10 MILLIGRAM(S): 2.5 TABLET ORAL at 21:26

## 2019-03-02 RX ADMIN — HEPARIN SODIUM 5000 UNIT(S): 5000 INJECTION INTRAVENOUS; SUBCUTANEOUS at 18:17

## 2019-03-02 RX ADMIN — Medication 1 TABLET(S): at 21:27

## 2019-03-02 RX ADMIN — PANTOPRAZOLE SODIUM 40 MILLIGRAM(S): 20 TABLET, DELAYED RELEASE ORAL at 06:25

## 2019-03-02 RX ADMIN — Medication 200 MILLIGRAM(S): at 01:54

## 2019-03-02 RX ADMIN — DOPAMINE HYDROCHLORIDE 18.68 MICROGRAM(S)/KG/MIN: 40 INJECTION, SOLUTION, CONCENTRATE INTRAVENOUS at 02:07

## 2019-03-02 NOTE — CONSULT NOTE ADULT - ASSESSMENT
63M    ESRD on HD  H/O aortic valve replacement  HX AVF infection with pseudomonas (1/2/19- s/p vanc/gent but (I) gent, 4/2018, 3/2018), R fluor    Admitted with SOB after missing HD  SIRS on admission, sepsis ruled out T35.9 P96 WBC 6  Hypotension    *******  - f/u bcx  - monitor off abx for now, if any hemodynamic compromise, start cefepime 1g q24h 63M    ESRD on HD  H/O aortic valve replacement  HX AVF infection with pseudomonas (1/2/19- s/p vanc/gent but (I) gent, 4/2018, 3/2018), R fluor    Admitted with SOB after missing HD  SIRS on admission, sepsis ruled out T35.9 P96 WBC 6  systolic Hypotension  Completed recent vanc/gent course for pseudomonal infection (pseudo was I gent)    - f/u bcx  - monitor off abx for now, if any fever or hemodynamic compromise, start cefepime 1g q24h  - as sore throat, rhinorrhea would send RVP, CXR

## 2019-03-02 NOTE — CHART NOTE - NSCHARTNOTEFT_GEN_A_CORE
Blood pressure dropped to systolic in the 80's but patient just received midodrine, tells me he is having back pain (Informed that blood pressure too low for narcotic but will be reassess) If blood pressure not improving soon, will try levophed

## 2019-03-02 NOTE — CONSULT NOTE ADULT - ASSESSMENT
IMPRESSION:  ?? sepsis septic shock / hypotension   ESRD   history of graft infection pseudomonas   pulmonary edema fluid over load     PLAN:    CNS: no sedation     HEENT:  oral care   PULMONARY: keep pox > 92 %     CARDIOVASCULAR: start levophid SBP 80 and need to start HD   echo   cardiology follow up  keep IS < OS     GI: GI prophylaxis.  Feeding     RENAL: HD today follow renal and lytes keep Is < OS     INFECTIOUS DISEASE: off abx as per ID   follow cx for now     HEMATOLOGICAL:  DVT prophylaxis.    ENDOCRINE:  Follow up FS.  Insulin protocol if needed.    MUSCULOSKELETAL:    ICu Monitoring     CRITICAL CARE TIME SPENT: ***

## 2019-03-02 NOTE — CONSULT NOTE ADULT - SUBJECTIVE AND OBJECTIVE BOX
S :   63y year old Male seen at bedside  painful thickened, dystrophic, ingrowing  and long toenails digits 1-5 bilaterally  and preventative foot examination. Patient is medically managed  by Medicine.  Pt not alert at the moment. No wounds present.     Patient admits to  (-) Fevers, (-) Chills, (-) Nausea, (-) Vomiting, (-) Shortness of Breath (-) calf pain (-) chest pain       PMH: Bleeding ulcer  Dialysis patient  CKD (chronic kidney disease)  No pertinent past medical history    PSH:H/O aortic valve replacement  No significant past surgical history      Allergies:Gluten (Unknown)  naproxen (Other)  penicillin (Rash)      Labs:                        13.8   6.66  )-----------( 127      ( 01 Mar 2019 18:10 )             45.8       WBC Trend  6.66 Date (03-01 @ 18:10)      Chem  03-01    135  |  97<L>  |  17  ----------------------------<  99  4.3   |  25  |  5.9<HH>    Ca    10.5<H>      01 Mar 2019 18:10    TPro  6.5  /  Alb  3.6  /  TBili  0.4  /  DBili  x   /  AST  16  /  ALT  6   /  AlkPhos  98  03-01          T(F): 96.5 (03-02-19 @ 05:58), Max: 96.7 (03-01-19 @ 16:51)  HR: 85 (03-02-19 @ 06:26) (79 - 96)  BP: 95/55 (03-02-19 @ 06:26) (75/49 - 103/64)  RR: 16 (03-02-19 @ 05:58) (16 - 20)  SpO2: 98% (03-01-19 @ 21:38) (94% - 98%)  Wt(kg): --    O:   Derm: no open ulceration. Red/purple Discoloration of b/l feet. Mild Xerosis B/L   Skin warm, dry and supple bilateral.  No open lesions or inter-digital macerations noted bilateral.   Toenails 1-5 Right and Left feet thickened, elongated, discolored, and dystrophic with subungual debris. There is pain upon palpation of all fungal and ingrowing nails 1-5 bilaterally.   Vascular: Dorsalis Pedis and Posterior Tibial pulses non-palpable .  Capillary re-fill time less than 3 seconds digits 1-5 bilateral. Venus stasis b/l LE  Neuro: Protective sensation intact to the level of the digits bilateral.  MSK: Muscle strength 5/5 all major muscle groups bilateral. No structural abnormality, bilaterally      A:   1. bilateral hypertrohic Onychomycosis digits 1-5   2. Pain from Elongated nails, ingrowing  and dystrophic nails  P:   No open wounds B/L feet  Aseptic debridement and curretage  of all fungal and ingrowing  nails 1-5 bilateral with sterile nail pack  Please re-consult as needed. S :   63y year old Male seen at bedside  painful thickened, dystrophic, ingrowing  and long toenails digits 1-5 bilaterally  and preventative foot examination. Patient is medically managed  by Medicine.  Pt not alert at the moment. No wounds present.         PMH: Bleeding ulcer  Dialysis patient  CKD (chronic kidney disease)  No pertinent past medical history    PSH:H/O aortic valve replacement  No significant past surgical history      Allergies:Gluten (Unknown)  naproxen (Other)  penicillin (Rash)      Labs:                        13.8   6.66  )-----------( 127      ( 01 Mar 2019 18:10 )             45.8       WBC Trend  6.66 Date (03-01 @ 18:10)      Chem  03-01    135  |  97<L>  |  17  ----------------------------<  99  4.3   |  25  |  5.9<HH>    Ca    10.5<H>      01 Mar 2019 18:10    TPro  6.5  /  Alb  3.6  /  TBili  0.4  /  DBili  x   /  AST  16  /  ALT  6   /  AlkPhos  98  03-01      REVIEW OF SYSTEMS:    EYES/ENT: No visual changes;  No vertigo or throat pain   NECK: No pain or stiffness  GENITOURINARY: No dysuria, frequency or hematuria      T(F): 96.5 (03-02-19 @ 05:58), Max: 96.7 (03-01-19 @ 16:51)  HR: 85 (03-02-19 @ 06:26) (79 - 96)  BP: 95/55 (03-02-19 @ 06:26) (75/49 - 103/64)  RR: 16 (03-02-19 @ 05:58) (16 - 20)  SpO2: 98% (03-01-19 @ 21:38) (94% - 98%)  Wt(kg): --    O:   Derm: no open ulceration. Red/purple Discoloration of b/l feet. Mild Xerosis B/L   Skin warm, dry and supple bilateral.  No open lesions or inter-digital macerations noted bilateral.   Toenails 1-5 Right and Left feet thickened, elongated, discolored, and dystrophic with subungual debris. There is pain upon palpation of all fungal and ingrowing nails 1-5 bilaterally.   Vascular: Dorsalis Pedis and Posterior Tibial pulses non-palpable .  Capillary re-fill time less than 3 seconds digits 1-5 bilateral. Venus stasis b/l LE  Neuro: Protective sensation intact to the level of the digits bilateral.  MSK: Muscle strength 5/5 all major muscle groups bilateral. No structural abnormality, bilaterally      A:   1. bilateral hypertrohic Onychomycosis digits 1-5   2. Pain from Elongated nails, ingrowing  and dystrophic nails  P:   No open wounds B/L feet  Aseptic debridement and curretage  of all fungal and ingrowing  nails 1-5 bilateral with sterile nail pack  Please re-consult as needed.

## 2019-03-02 NOTE — CHART NOTE - NSCHARTNOTEFT_GEN_A_CORE
Dopamine started (while waiting for midodrine to arrive from [facility]) and although blood pressure much better (systolic of 94) patient now with chest pain and SOB. Oxygen raised to 4 L with pulse ox now 94%. Due to side effects will stop dopamine. Vancomycin infusing, gentamycin to follow

## 2019-03-02 NOTE — CONSULT NOTE ADULT - ASSESSMENT
63/M with ESRD on HD MWF, AVR, low back pain, recent AVF infection, chronic back pain presents with weakness and inability to walk, missed his HD yesterday.    ESRD - HD today  3 hrs, 2 K bath  UF 1 L as kerline (Bp is low)    Hypercalcemia likely secondary hyperparathyroidism - check phos and iPTH    CXR reviewed - b/l pl effusions and PVC  CHF - very high BNP    Hypotension - start Midodrine 10 mg q8hr  - did not tolerate Dopamin->chest pain  Although hypotension is chronic (not to this extent)  sepsis is to be ruled our  -BCx - posble Dago cath inf  - pt was recently on Vanco and Genta, would not cont the same  -check cortisol and TSH as well for hypotension  -check 2D echo r/o endocarditis    If pt is hypotensive, transfer to Unit    will follow

## 2019-03-02 NOTE — PROGRESS NOTE ADULT - ASSESSMENT
62yo male presents to the ER due to worsening shortness of breath and missed HD yesterday. Pt is anuric at baseline. Pt had multiple episodes of hypotension overnight that responded to midodrine and dopamine.  Pt upgraded to ICU for CVVH today    Dyspnea  -likely due to volume overload vs new onset heart failure  -CVVH to remove excess fluid  -2d echo pending    Troponemia  -pt experienced chest pain overnight, EKG shows NSR  -repeat CE pending  -may be due to CKD vs ischemia  -2d echo pending   -cardiology consult    Hypotension  -will check TSH and cortisol levels  -c/w midodrine  -if needed add on pressors  -2d echo r/o cardiogenic shock given chest pain and troponemia    History of left arm infection  -ID consult appreciated  -off antibiotics  -no evidence of infection  -monitor off abx    ESRD  -CVVH for today  -case d/w nephrology    Hypercalcemia  -check phosphorus and PTH     Macrocytic anemia  -check b12, folate      DVT px  Code status to be determined    #Progress Note Handoff:  Pending (specify):  CVVH, 2d echo, 2nd set CE  Family discussion: pending for today  Disposition: Home___/SNF___/Other________/Unknown at this time___x_____    **called 4 different phone numbers today to reach wife to discuss plan of care and discuss code status as pt [id number] but keeps falling asleep during conversations-one number was to [employer], another to a bank, and another said voice mail box full. Left message on business number listed in chart with direct call back number**

## 2019-03-02 NOTE — CONSULT NOTE ADULT - SUBJECTIVE AND OBJECTIVE BOX
CARDIOLOGY CONSULT NOTE     CHIEF COMPLAINT/REASON FOR CONSULT:    HPI:  64yo male presents to the ER due to worsening shortness of breath. Notes that he was too sick to go for his scheduled dialysis today. States he does not make urine. Patient was hypotensive in the ER but improved with IV fluids. Denies fevers but reports he had been IV antibiotics for 1 year previously for infection involving left upper extremity Adds that he gets side effects from penicillin derivatives (01 Mar 2019 20:12)      PAST MEDICAL & SURGICAL HISTORY:  Bleeding ulcer  Dialysis patient  CKD (chronic kidney disease)  H/O aortic valve replacement      Cardiac Risks:   [ ]HTN, [ ] DM, [ ] Smoking, [ ] FH,  [ ] Lipids        MEDICATIONS:  MEDICATIONS  (STANDING):  calcium carbonate    500 mG (Tums) Chewable 1 Tablet(s) Chew three times a day  chlorhexidine 4% Liquid 1 Application(s) Topical <User Schedule>  heparin  Injectable 5000 Unit(s) SubCutaneous every 12 hours  midodrine 10 milliGRAM(s) Oral three times a day  pantoprazole    Tablet 40 milliGRAM(s) Oral before breakfast      FAMILY HISTORY:      SOCIAL HISTORY:      [ ] Marital status     Allergies    Gluten (Unknown)  naproxen (Other)  penicillin (Rash)        	    REVIEW OF SYSTEMS:  CONSTITUTIONAL: No fever, weight loss, or fatigue  EYES: No eye pain, visual disturbances, or discharge  ENMT:  No difficulty hearing, tinnitus, vertigo; No sinus or throat pain  NECK: No pain or stiffness  RESPIRATORY: No cough, wheezing, chills or hemoptysis; No Shortness of Breath  CARDIOVASCULAR: No chest pain, palpitations, passing out, dizziness, or leg swelling  GASTROINTESTINAL: No abdominal or epigastric pain. No nausea, vomiting, or hematemesis; No diarrhea or constipation. No melena or hematochezia.  GENITOURINARY: No dysuria, frequency, hematuria, or incontinence  NEUROLOGICAL: No headaches, memory loss, loss of strength, numbness, or tremors  SKIN: No itching, burning, rashes, or lesions   	    PHYSICAL EXAM:  T(C): 35.8 (03-02-19 @ 05:58), Max: 35.9 (03-01-19 @ 16:51)  HR: 85 (03-02-19 @ 06:26) (79 - 96)  BP: 95/55 (03-02-19 @ 06:26) (75/49 - 103/64)  RR: 16 (03-02-19 @ 05:58) (16 - 20)  SpO2: 98% (03-01-19 @ 21:38) (94% - 98%)  Wt(kg): --  I&O's Summary    01 Mar 2019 07:01  -  02 Mar 2019 07:00  --------------------------------------------------------  IN: 500 mL / OUT: 0 mL / NET: 500 mL        Appearance: Normal	  Psychiatry: A & O x 3, Mood & affect appropriate  HEENT:   Normal oral mucosa, PERRL, EOMI	  Lymphatic: No lymphadenopathy  Cardiovascular: Normal S1 S2,RRR, No JVD, III/vi rod lsb  Respiratory: Lungs clear to auscultation	  Gastrointestinal:  Soft, Non-tender, + BS	  Skin: No rashes, No ecchymoses, No cyanosis	  Neurologic: Non-focal  Extremities: Normal range of motion, No clubbing, cyanosis or edema  Vascular: Peripheral pulses palpable 2+ bilaterally      ECG:  	< from: 12 Lead ECG (03.02.19 @ 06:32) >  Diagnosis Line Normal sinus rhythm  Left ventricular hypertrophy with repolarization abnormality  Abnormal ECG    Confirmed by SAMUEL PINON MD ([id number]) on 3/2/2019 7:51:31 AM    < end of copied text >      	  LABS:	 	    CARDIAC MARKERS:          Serum Pro-Brain Natriuretic Peptide: >00197 pg/mL (03-01 @ 18:10)                            13.8   6.66  )-----------( 127      ( 01 Mar 2019 18:10 )             45.8     03-01    135  |  97<L>  |  17  ----------------------------<  99  4.3   |  25  |  5.9<HH>    Ca    10.5<H>      01 Mar 2019 18:10    TPro  6.5  /  Alb  3.6  /  TBili  0.4  /  DBili  x   /  AST  16  /  ALT  6   /  AlkPhos  98  03-01    PT/INR - ( 01 Mar 2019 20:58 )   PT: 13.30 sec;   INR: 1.16 ratio         PTT - ( 01 Mar 2019 20:58 )  PTT:41.1 sec  proBNP: Serum Pro-Brain Natriuretic Peptide: >19477 pg/mL (03-01 @ 18:10)

## 2019-03-02 NOTE — CONSULT NOTE ADULT - SUBJECTIVE AND OBJECTIVE BOX
Patient is a 63y old  Male who presents with a chief complaint of Weakness, shortness of breath. (02 Mar 2019 10:01)      HPI:  62yo male presents to the ER due to worsening shortness of breath. Notes that he was too sick to go for his scheduled dialysis today. States he does not make urine. Patient was hypotensive in the ER but improved with IV fluids. Denies fevers but reports he had been IV antibiotics for 1 year previously for infection involving left upper extremity Adds that he gets side effects from penicillin derivatives (01 Mar 2019 20:12)  patient was on the floor BP low did not tolerate dopamin , and was approved this morning by Pulm/ cc  to ICU for HD and possible pressors requirement patient just came to CCU     PAST MEDICAL & SURGICAL HISTORY:  Bleeding ulcer  Dialysis patient  CKD (chronic kidney disease)  H/O aortic valve replacement    Allergies    Gluten (Unknown)  naproxen (Other)  penicillin (Rash)    Intolerances      Family history : no cardiovscular family history   Home Medications:  oxyCODONE:  (01 Mar 2019 19:11)    Occupation:  Alochol: Denied  Smoking: Denied  Drug Use: Denied  Marital Status:         ROS: as in HPI; All other systems reviewed are negative    ICU Vital Signs Last 24 Hrs  T(C): 34.7 (02 Mar 2019 11:54), Max: 35.9 (01 Mar 2019 16:51)  T(F): 94.5 (02 Mar 2019 11:54), Max: 96.7 (01 Mar 2019 16:51)  HR: 82 (02 Mar 2019 12:05) (79 - 96)  BP: 91/59 (02 Mar 2019 12:05) (75/49 - 103/64)  BP(mean): 70 (02 Mar 2019 12:05) (70 - 70)  ABP: --  ABP(mean): --  RR: 17 (02 Mar 2019 12:05) (16 - 20)  SpO2: 97% (02 Mar 2019 12:05) (94% - 98%)        Physical Examination:    General: No acute distress.  Alert, oriented, interactive, nonfocal    HEENT: Pupils equal, reactive to light.  Symmetric.    PULM:b/l crackles     CVS: Regular rate and rhythm, no murmurs, rubs, or gallops    ABD: Soft, nondistended, nontender, normoactive bowel sounds, no masses    EXT:1 edema, nontender, no clubbing     SKIN: Warm and well perfused, no rashes noted.    Neurology : no motor or sensory deficit     Musculoskeletal : move all extremety     Lymphatic system: no Palpable node               I&O's Detail    01 Mar 2019 07:01  -  02 Mar 2019 07:00  --------------------------------------------------------  IN:    Sodium Chloride 0.9% IV Bolus: 500 mL  Total IN: 500 mL    OUT:  Total OUT: 0 mL    Total NET: 500 mL            LABS:                        13.8   6.66  )-----------( 127      ( 01 Mar 2019 18:10 )             45.8     01 Mar 2019 18:10    135    |  97     |  17     ----------------------------<  99     4.3     |  25     |  5.9      Ca    10.5       01 Mar 2019 18:10    TPro  6.5    /  Alb  3.6    /  TBili  0.4    /  DBili  x      /  AST  16     /  ALT  6      /  AlkPhos  98     01 Mar 2019 18:10  Amylase x     lipase x          CARDIAC MARKERS ( 02 Mar 2019 08:46 )  x     / 0.08 ng/mL / 23 U/L / x     / 3.8 ng/mL  CARDIAC MARKERS ( 01 Mar 2019 18:10 )  x     / 0.09 ng/mL / x     / x     / x          CAPILLARY BLOOD GLUCOSE        PT/INR - ( 01 Mar 2019 20:58 )   PT: 13.30 sec;   INR: 1.16 ratio         PTT - ( 01 Mar 2019 20:58 )  PTT:41.1 sec    Culture        MEDICATIONS  (STANDING):  calcium carbonate    500 mG (Tums) Chewable 1 Tablet(s) Chew three times a day  chlorhexidine 4% Liquid 1 Application(s) Topical <User Schedule>  heparin  Injectable 5000 Unit(s) SubCutaneous every 12 hours  midodrine 10 milliGRAM(s) Oral three times a day  norepinephrine Infusion 0.05 MICROgram(s)/kG/Min (4.669 mL/Hr) IV Continuous <Continuous>  pantoprazole    Tablet 40 milliGRAM(s) Oral before breakfast    MEDICATIONS  (PRN):  acetaminophen   Tablet .. 650 milliGRAM(s) Oral every 6 hours PRN Mild Pain (1 - 3)  oxyCODONE    IR 15 milliGRAM(s) Oral four times a day PRN Moderate Pain (4 - 6)        RADIOLOGY: ***     CXR: b/l effusion L > R   TLC:  OG:  ET tube:          ECHO:

## 2019-03-02 NOTE — CONSULT NOTE ADULT - ASSESSMENT
IMPRESSION:  ?? sepsis septic shock / hypotension   ESRD   history of graft infection pseudomonas   pulmonary edema fluid over load     PLAN:    CNS: no sedation     HEENT:  oral care   PULMONARY: keep pox > 92 %     CARDIOVASCULAR: start levophid SBP 80 and need to start HD   echo   cardiology follow up  keep IS < OS     GI: GI prophylaxis.  Feeding     RENAL: HD today follow renal and lytes keep Is < OS     INFECTIOUS DISEASE: off abx as per ID   follow cx for now     HEMATOLOGICAL:  DVT prophylaxis.    ENDOCRINE:  Follow up FS.  Insulin protocol if needed.    MUSCULOSKELETAL:    ICU Monitoring

## 2019-03-02 NOTE — CONSULT NOTE ADULT - ASSESSMENT
Patient with esrd. He was hypotensive. Need watch for infection. He has loud murmur. Not clear if new. Culture. Support. Echo

## 2019-03-02 NOTE — CONSULT NOTE ADULT - SUBJECTIVE AND OBJECTIVE BOX
NEPHROLOGY CONSULTATION NOTE    Patient is a 63y Male whom presented to the hospital with weakness, missed HD yesterday. Normally walks with a walker, but was not able to get up and go to his regular HD ctr.  Pt with ESRD on HD MWF, chronic low back pain (on Oxycodone), recent AVF infection and probably ligation, was on IV ABx, (Genta and [facility] Del [facility]eno-completed the course). Has Rt fem Dago permacath now for HD.  Usual BP around 100, takes off 3 L with HD. Loosing weight recently. Declining functional status. No SOB, cough, no epidurals, feels ne needs to urinate, but he cant. No chills or fevers.  Admitted to  - was hypotensive, was started on Dopamine.    PAST MEDICAL & SURGICAL HISTORY:  Bleeding ulcer  Dialysis patient  CKD (chronic kidney disease)  H/O aortic valve replacement    Allergies:  Gluten (Unknown)  naproxen (Other)  penicillin (Rash)    Home Medications Reviewed  Hospital Medications:   MEDICATIONS  (STANDING):  calcium carbonate    500 mG (Tums) Chewable 1 Tablet(s) Chew three times a day  chlorhexidine 4% Liquid 1 Application(s) Topical <User Schedule>  heparin  Injectable 5000 Unit(s) SubCutaneous every 12 hours  midodrine 10 milliGRAM(s) Oral three times a day  pantoprazole    Tablet 40 milliGRAM(s) Oral before breakfast      SOCIAL HISTORY:  Denies ETOH,Smoking,   FAMILY HISTORY:        REVIEW OF SYSTEMS:  CONSTITUTIONAL: HAs weakness, no fevers or chills  RESPIRATORY: No cough, wheezing, hemoptysis; No shortness of breath  CARDIOVASCULAR: No chest pain or palpitations.  GASTROINTESTINAL: No abdominal or epigastric pain. No nausea, vomiting, or hematemesis; No diarrhea or constipation.  GENITOURINARY: Has the urge to urinate  SKIN: stasis dermatitis  VASCULAR: No bilateral lower extremity edema.   All other review of systems is negative unless indicated above.    VITALS:  T(F): 96.5 (03-02-19 @ 05:58), Max: 96.7 (03-01-19 @ 16:51)  HR: 85 (03-02-19 @ 06:26)  BP: 95/55 (03-02-19 @ 06:26)  RR: 16 (03-02-19 @ 05:58)  SpO2: 98% (03-01-19 @ 21:38)    03-01 @ 07:01  -  03-02 @ 07:00  --------------------------------------------------------  IN: 500 mL / OUT: 0 mL / NET: 500 mL      Height (cm): 185.42 (03-02 @ 01:04)  Weight (kg): 99.6 (03-02 @ 01:04)  BMI (kg/m2): 29 (03-02 @ 01:04)  BSA (m2): 2.24 (03-02 @ 01:04)      I&O's Detail    01 Mar 2019 07:01  -  02 Mar 2019 07:00  --------------------------------------------------------  IN:    Sodium Chloride 0.9% IV Bolus: 500 mL  Total IN: 500 mL    OUT:  Total OUT: 0 mL    Total NET: 500 mL            PHYSICAL EXAM:  Constitutional: NAD, appears chronically ill  HEENT: anicteric sclera, oropharynx clear, MMM  Neck: No JVD  Respiratory: CTAB, no wheezes, rales or rhonchi  Cardiovascular: S1, S2, RRR, 3/4 ESM  Gastrointestinal: BS+, soft, NT/ND  Extremities: Stasis dermatitis. No peripheral edema  Neurological: A/O x 3  Psychiatric: Normal mood, normal affect  : No CVA tenderness. No rosenberg.   Skin: Lt upper chest post op scar well healed, stasis dermatitis  Vascular Access: Lt fem Dago cath    LABS:  03-01    135  |  97<L>  |  17  ----------------------------<  99  4.3   |  25  |  5.9<HH>    Ca    10.5<H>      01 Mar 2019 18:10    TPro  6.5  /  Alb  3.6  /  TBili  0.4  /  DBili      /  AST  16  /  ALT  6   /  AlkPhos  98  03-01    Creatinine Trend: 5.9 <--                        13.8   6.66  )-----------( 127      ( 01 Mar 2019 18:10 )             45.8     Urine Studies:              RADIOLOGY & ADDITIONAL STUDIES:

## 2019-03-02 NOTE — CHART NOTE - NSCHARTNOTEFT_GEN_A_CORE
Patient seems more awake with systolic 93 (diastolic 63) which patient reports is close to his baseline blood pressure

## 2019-03-02 NOTE — CONSULT NOTE ADULT - SUBJECTIVE AND OBJECTIVE BOX
ANNIE ARGUETA        Patient is a 63y old  Male who presents with a chief complaint of sepsis (02 Mar 2019 12:37). Pt seen and examined 3/2/19.      HPI:  62yo male presents to the ER due to worsening shortness of breath. Notes that he was too sick to go for his scheduled dialysis today. States he does not make urine. Patient was hypotensive in the ER but improved with IV fluids. Denies fevers but reports he had been IV antibiotics for 1 year previously for infection involving left upper extremity Adds that he gets side effects from penicillin derivatives (01 Mar 2019 20:12). I spoke with renal attending who requested transfer to the MICU for pressors and HD.      Allergies    Gluten (Unknown)  naproxen (Other)  penicillin (Rash)    Intolerances        Daily     Daily Weight in k.2 (02 Mar 2019 11:54)    I&O's Summary    02 Mar 2019 07:  -  03 Mar 2019 07:00  --------------------------------------------------------  IN: 331 mL / OUT: 1000 mL / NET: -669 mL    03 Mar 2019 07:01  -  03 Mar 2019 09:48  --------------------------------------------------------  IN: 33 mL / OUT: 0 mL / NET: 33 mL        FAMILY HISTORY:      SOCIAL:  Unable to obtain due to patient's condition.        HEALTH ISSUES - PROBLEM Dx:  Foot dermatitis: Foot dermatitis  ESRD (end stage renal disease): ESRD (end stage renal disease)  Hypotension: Hypotension  Elevated troponin: Elevated troponin  SOB (shortness of breath): SOB (shortness of breath)          Vital Signs Last 24 Hrs  T(C): 35.6 (03 Mar 2019 07:01), Max: 36.2 (02 Mar 2019 19:01)  T(F): 96 (03 Mar 2019 07:01), Max: 97.1 (02 Mar 2019 19:01)  HR: 77 (03 Mar 2019 09:08) (73 - 995)  BP: 90/52 (03 Mar 2019 09:08) (72/48 - 991/616)  BP(mean): 66 (03 Mar 2019 09:08) (56 - 92)  RR: 19 (03 Mar 2019 09:08) (16 - 58)  SpO2: 97% (03 Mar 2019 09:08) (90% - 99%)      PT/INR - ( 03 Mar 2019 06:06 )   PT: 13.50 sec;   INR: 1.18 ratio         PTT - ( 03 Mar 2019 06:06 )  PTT:48.4 sec                          14.3   9.72  )-----------( 136      ( 03 Mar 2019 06:06 )             48.9       03-03    140  |  99  |  16  ----------------------------<  102<H>  4.3   |  26  |  5.7<HH>    Ca    10.1      03 Mar 2019 06:06  Phos  7.6     03-03  Mg     2.2     03-03    TPro  6.8  /  Alb  3.9  /  TBili  0.5  /  DBili  x   /  AST  12  /  ALT  6   /  AlkPhos  112  03-03      CARDIAC MARKERS ( 02 Mar 2019 15:48 )  x     / 0.09 ng/mL / x     / x     / x      CARDIAC MARKERS ( 02 Mar 2019 13:07 )  x     / 0.10 ng/mL / 19 U/L / x     / 3.5 ng/mL  CARDIAC MARKERS ( 02 Mar 2019 08:46 )  x     / 0.08 ng/mL / 23 U/L / x     / 3.8 ng/mL  CARDIAC MARKERS ( 01 Mar 2019 18:10 )  x     / 0.09 ng/mL / x     / x     / x            LIVER FUNCTIONS - ( 03 Mar 2019 06:06 )  Alb: 3.9 g/dL / Pro: 6.8 g/dL / ALK PHOS: 112 U/L / ALT: 6 U/L / AST: 12 U/L / GGT: x                   Radiology: Radiology personally reviewed.    MEDICATIONS  (STANDING):  calcium carbonate    500 mG (Tums) Chewable 1 Tablet(s) Chew three times a day  chlorhexidine 4% Liquid 1 Application(s) Topical <User Schedule>  heparin  Injectable 5000 Unit(s) SubCutaneous every 12 hours  midodrine 10 milliGRAM(s) Oral three times a day  norepinephrine Infusion 0.05 MICROgram(s)/kG/Min (4.669 mL/Hr) IV Continuous <Continuous>  pantoprazole    Tablet 40 milliGRAM(s) Oral before breakfast    MEDICATIONS  (PRN):  acetaminophen   Tablet .. 650 milliGRAM(s) Oral every 6 hours PRN Mild Pain (1 - 3)  oxyCODONE    IR 15 milliGRAM(s) Oral four times a day PRN Moderate Pain (4 - 6)      Prescriptions:        REVIEW OF SYSTEMS:    Review of Systems:  · CONSTITUTIONAL: no fever and no chills.  · EYES: no discharge, no irritation, no pain, no redness, and no visual changes.  · ENMT: Ears: no ear pain and no hearing problems.Nose: no nasal congestion and no nasal drainage.Mouth/Throat: no dysphagia, no hoarseness and no throat pain.Neck: no lumps, no pain, no stiffness and no swollen glands.  · CARDIOVASCULAR: no chest pain  · RESPIRATORY: - - -   · Respiratory [+]: +SOB   · GASTROINTESTINAL: no abdominal pain, no bloating, no constipation, no diarrhea, no nausea and no vomiting.  · GENITOURINARY: no dysuria, no frequency, and no hematuria.  · MUSCULOSKELETAL: no back pain, no gout, no musculoskeletal pain, no neck pain, and no weakness.  · SKIN: no abrasions, no jaundice, no lesions, no pruritis, and no rashes.  · NEURO: no loss of consciousness, no gait abnormality, no headache, no sensory deficits, and no weakness.  · PSYCHIATRIC: no known mental health issues.    PHYSICAL EXAM:   · CONSTITUTIONAL: ill appearing, mild SOB  · ENMT: Airway patent, Nasal mucosa clear. Mouth with normal mucosa. Throat has no vesicles, no oropharyngeal exudates and uvula is midline.  · EYES: Clear bilaterally, pupils equal, round and reactive to light.  · CARDIAC: Normal rate, regular rhythm.  Heart sounds S1, S2 +S3.  No murmurs, rubs or gallops.  · RESPIRATORY: b/lrales  · GASTROINTESTINAL: Abdomen soft, non-tender, no guarding.  · MUSCULOSKELETAL: Spine appears normal, range of motion is not limited, no muscle or joint tenderness  · NEUROLOGICAL: Alert and oriented, no focal deficits, no motor or sensory deficits.  · SKIN: Skin normal color for race, warm, dry and intact. No evidence of rash.  · PSYCHIATRIC: Alert and oriented  · HEME LYMPH: No adenopathy or splenomegaly. No cervical or inguinal lymphadenopathy.

## 2019-03-02 NOTE — CONSULT NOTE ADULT - SUBJECTIVE AND OBJECTIVE BOX
ARGUETAANNIE  63y, Male  Allergy: Gluten (Unknown)  naproxen (Other)  penicillin (Rash)      HPI:  62yo male presents to the ER due to worsening shortness of breath. Notes that he was too sick to go for his scheduled dialysis today. States he does not make urine. Patient was hypotensive in the ER but improved with IV fluids. Denies fevers but reports he had been IV antibiotics for 1 year previously for infection involving left upper extremity Adds that he gets side effects from penicillin derivatives (01 Mar 2019 20:12)    FAMILY HISTORY:    PAST MEDICAL & SURGICAL HISTORY:  Bleeding ulcer  Dialysis patient  CKD (chronic kidney disease)  H/O aortic valve replacement      ROS negative except as per HPI    VITALS:  T(F): 96.5, Max: 96.7 (03-01-19 @ 16:51)  HR: 85  BP: 95/55  RR: 16Vital Signs Last 24 Hrs  T(C): 35.8 (02 Mar 2019 05:58), Max: 35.9 (01 Mar 2019 16:51)  T(F): 96.5 (02 Mar 2019 05:58), Max: 96.7 (01 Mar 2019 16:51)  HR: 85 (02 Mar 2019 06:26) (79 - 96)  BP: 95/55 (02 Mar 2019 06:26) (75/49 - 103/64)  BP(mean): --  RR: 16 (02 Mar 2019 05:58) (16 - 20)  SpO2: 98% (01 Mar 2019 21:38) (94% - 98%)    PHYSICAL EXAM:  ***    TESTS & MEASUREMENTS:                        13.8   6.66  )-----------( 127      ( 01 Mar 2019 18:10 )             45.8     03-01    135  |  97<L>  |  17  ----------------------------<  99  4.3   |  25  |  5.9<HH>    Ca    10.5<H>      01 Mar 2019 18:10    TPro  6.5  /  Alb  3.6  /  TBili  0.4  /  DBili  x   /  AST  16  /  ALT  6   /  AlkPhos  98  03-01    LIVER FUNCTIONS - ( 01 Mar 2019 18:10 )  Alb: 3.6 g/dL / Pro: 6.5 g/dL / ALK PHOS: 98 U/L / ALT: 6 U/L / AST: 16 U/L / GGT: x                   RADIOLOGY & ADDITIONAL TESTS:    ANTIBIOTICS:  gentamicin   IVPB   200 mL/Hr IV Intermittent (03-02-19 @ 01:54)    vancomycin  IVPB   250 mL/Hr IV Intermittent (03-02-19 @ 01:54) ANNIE ARGUETA  63y, Male  Allergy: Gluten (Unknown)  naproxen (Other)  penicillin (Rash)      HPI:  62yo male presents to the ER due to worsening shortness of breath. Notes that he was too sick to go for his scheduled dialysis today. States he does not make urine. Patient was hypotensive in the ER but improved with IV fluids. Denies fevers but reports he had been IV antibiotics for 1 year previously for infection involving left upper extremity Adds that he gets side effects from penicillin derivatives (01 Mar 2019 20:12)    FAMILY HISTORY:    PAST MEDICAL & SURGICAL HISTORY:  Bleeding ulcer  Dialysis patient  CKD (chronic kidney disease)  H/O aortic valve replacement      ROS negative except as per HPI    VITALS:  T(F): 96.5, Max: 96.7 (03-01-19 @ 16:51)  HR: 85  BP: 95/55  RR: 16Vital Signs Last 24 Hrs  T(C): 35.8 (02 Mar 2019 05:58), Max: 35.9 (01 Mar 2019 16:51)  T(F): 96.5 (02 Mar 2019 05:58), Max: 96.7 (01 Mar 2019 16:51)  HR: 85 (02 Mar 2019 06:26) (79 - 96)  BP: 95/55 (02 Mar 2019 06:26) (75/49 - 103/64)  BP(mean): --  RR: 16 (02 Mar 2019 05:58) (16 - 20)  SpO2: 98% (01 Mar 2019 21:38) (94% - 98%)    PHYSICAL EXAM:  Gen: lethargic  HEENT: NCAT. EOMI. MMM.   Neck: Supple, no cervical LAD  CV: RRR, no murmurs  Lungs: CTAB, no w/r/r  Abd: Soft. NTND  Extr: LUE scar from AVF c/d/i no fluctuance  Skin: no rash  Neuro: No focal deficits  Lines: clean      TESTS & MEASUREMENTS:                        13.8   6.66  )-----------( 127      ( 01 Mar 2019 18:10 )             45.8     03-01    135  |  97<L>  |  17  ----------------------------<  99  4.3   |  25  |  5.9<HH>    Ca    10.5<H>      01 Mar 2019 18:10    TPro  6.5  /  Alb  3.6  /  TBili  0.4  /  DBili  x   /  AST  16  /  ALT  6   /  AlkPhos  98  03-01    LIVER FUNCTIONS - ( 01 Mar 2019 18:10 )  Alb: 3.6 g/dL / Pro: 6.5 g/dL / ALK PHOS: 98 U/L / ALT: 6 U/L / AST: 16 U/L / GGT: x                   RADIOLOGY & ADDITIONAL TESTS:    ANTIBIOTICS:  gentamicin   IVPB   200 mL/Hr IV Intermittent (03-02-19 @ 01:54)    vancomycin  IVPB   250 mL/Hr IV Intermittent (03-02-19 @ 01:54)

## 2019-03-02 NOTE — CHART NOTE - NSCHARTNOTEFT_GEN_A_CORE
EKG to me is not significantly changes compared to ER EKG. EKG to me is not significantly changes compared to ER EKG and patient reports chest pain is gone

## 2019-03-02 NOTE — PROGRESS NOTE ADULT - SUBJECTIVE AND OBJECTIVE BOX
CHIEF COMPLAINT:    Patient is a 63y old  Male who presents with a chief complaint of weakness and SOB    INTERVAL HPI/OVERNIGHT EVENTS:    Patient seen and examined at bedside. Overnight pt had episodes of hypotension requiring dopamine and midodrine.    ROS: Pt somnolent, only answering saying "I don't feel well".    Vital Signs:    T(F): 96.5 (03-02-19 @ 05:58), Max: 96.7 (03-01-19 @ 16:51)  HR: 85 (03-02-19 @ 06:26) (79 - 96)  BP: 95/55 (03-02-19 @ 06:26) (75/49 - 103/64)  RR: 16 (03-02-19 @ 05:58) (16 - 20)  SpO2: 98% (03-01-19 @ 21:38) (94% - 98%)  I&O's Summary    01 Mar 2019 07:01  -  02 Mar 2019 07:00  --------------------------------------------------------  IN: 500 mL / OUT: 0 mL / NET: 500 mL      Daily Height in cm: 185.42 (02 Mar 2019 01:04)    Daily   CAPILLARY BLOOD GLUCOSE      PHYSICAL EXAM:  GENERAL:  NAD  SKIN: No rashes or lesions  HEENT: Atraumatic. Normocephalic. Anicteric  NECK:  No JVD.   PULMONARY: Clear to ausculation bilaterally. No wheezing. No rales  CVS: 4/6 dialstolic murmur loudest right parasternal boarder  ABDOMEN/GI: Soft, Nontender, Nondistended; Bowel sounds are present  EXTREMITIES:  No edema B/L LE.  NEUROLOGIC:  No motor deficit.  PSYCH: Alert & oriented x 3, normal affect    Consultant(s) Notes Reviewed:  [x ] YES  [ ] NO  Care Discussed with Consultants/Other Providers [ x] YES  [ ] NO-Dr. Mills    LABS:                        13.8   6.66  )-----------( 127      ( 01 Mar 2019 18:10 )             45.8     03-01    135  |  97<L>  |  17  ----------------------------<  99  4.3   |  25  |  5.9<HH>    Ca    10.5<H>      01 Mar 2019 18:10    TPro  6.5  /  Alb  3.6  /  TBili  0.4  /  DBili  x   /  AST  16  /  ALT  6   /  AlkPhos  98  03-01    PT/INR - ( 01 Mar 2019 20:58 )   PT: 13.30 sec;   INR: 1.16 ratio         PTT - ( 01 Mar 2019 20:58 )  PTT:41.1 sec  Serum Pro-Brain Natriuretic Peptide: >36041 pg/mL (03-01-19 @ 18:10)    Trop 0.09, CKMB --, CK --, 03-01-19 @ 18:10        RADIOLOGY & ADDITIONAL TESTS:  Imaging or report Personally Reviewed:  [ x] YES  [ ] NO  B/l interstitial opacities    EKG reviewed independently-NSR with LVH criteria    Medications:  Standing  calcium carbonate    500 mG (Tums) Chewable 1 Tablet(s) Chew three times a day  chlorhexidine 4% Liquid 1 Application(s) Topical <User Schedule>  heparin  Injectable 5000 Unit(s) SubCutaneous every 12 hours  midodrine 10 milliGRAM(s) Oral three times a day  pantoprazole    Tablet 40 milliGRAM(s) Oral before breakfast    PRN Meds  acetaminophen   Tablet .. 650 milliGRAM(s) Oral every 6 hours PRN  oxyCODONE    IR 15 milliGRAM(s) Oral four times a day PRN

## 2019-03-03 LAB
ALBUMIN SERPL ELPH-MCNC: 3.9 G/DL — SIGNIFICANT CHANGE UP (ref 3.5–5.2)
ALP SERPL-CCNC: 112 U/L — SIGNIFICANT CHANGE UP (ref 30–115)
ALT FLD-CCNC: 6 U/L — SIGNIFICANT CHANGE UP (ref 0–41)
ANION GAP SERPL CALC-SCNC: 15 MMOL/L — HIGH (ref 7–14)
APTT BLD: 48.4 SEC — HIGH (ref 27–39.2)
AST SERPL-CCNC: 12 U/L — SIGNIFICANT CHANGE UP (ref 0–41)
BASOPHILS # BLD AUTO: 0.03 K/UL — SIGNIFICANT CHANGE UP (ref 0–0.2)
BASOPHILS NFR BLD AUTO: 0.3 % — SIGNIFICANT CHANGE UP (ref 0–1)
BILIRUB SERPL-MCNC: 0.5 MG/DL — SIGNIFICANT CHANGE UP (ref 0.2–1.2)
BUN SERPL-MCNC: 16 MG/DL — SIGNIFICANT CHANGE UP (ref 10–20)
CALCIUM SERPL-MCNC: 10 MG/DL — SIGNIFICANT CHANGE UP (ref 8.4–10.5)
CALCIUM SERPL-MCNC: 10.1 MG/DL — SIGNIFICANT CHANGE UP (ref 8.5–10.1)
CHLORIDE SERPL-SCNC: 99 MMOL/L — SIGNIFICANT CHANGE UP (ref 98–110)
CO2 SERPL-SCNC: 26 MMOL/L — SIGNIFICANT CHANGE UP (ref 17–32)
CORTIS AM PEAK SERPL-MCNC: 20.9 UG/DL — HIGH (ref 6–18.4)
CREAT SERPL-MCNC: 5.7 MG/DL — CRITICAL HIGH (ref 0.7–1.5)
EOSINOPHIL # BLD AUTO: 0.03 K/UL — SIGNIFICANT CHANGE UP (ref 0–0.7)
EOSINOPHIL NFR BLD AUTO: 0.3 % — SIGNIFICANT CHANGE UP (ref 0–8)
GAS PNL BLDA: SIGNIFICANT CHANGE UP
GLUCOSE SERPL-MCNC: 102 MG/DL — HIGH (ref 70–99)
HCT VFR BLD CALC: 48.9 % — SIGNIFICANT CHANGE UP (ref 42–52)
HCV AB S/CO SERPL IA: 0.49 S/CO — SIGNIFICANT CHANGE UP (ref 0–0.79)
HCV AB SERPL-IMP: SIGNIFICANT CHANGE UP
HGB BLD-MCNC: 14.3 G/DL — SIGNIFICANT CHANGE UP (ref 14–18)
IMM GRANULOCYTES NFR BLD AUTO: 0.5 % — HIGH (ref 0.1–0.3)
INR BLD: 1.18 RATIO — SIGNIFICANT CHANGE UP (ref 0.65–1.3)
LYMPHOCYTES # BLD AUTO: 0.88 K/UL — LOW (ref 1.2–3.4)
LYMPHOCYTES # BLD AUTO: 9.1 % — LOW (ref 20.5–51.1)
MAGNESIUM SERPL-MCNC: 2.2 MG/DL — SIGNIFICANT CHANGE UP (ref 1.8–2.4)
MCHC RBC-ENTMCNC: 29.2 G/DL — LOW (ref 32–37)
MCHC RBC-ENTMCNC: 30.2 PG — SIGNIFICANT CHANGE UP (ref 27–31)
MCV RBC AUTO: 103.2 FL — HIGH (ref 80–94)
MONOCYTES # BLD AUTO: 1.09 K/UL — HIGH (ref 0.1–0.6)
MONOCYTES NFR BLD AUTO: 11.2 % — HIGH (ref 1.7–9.3)
NEUTROPHILS # BLD AUTO: 7.64 K/UL — HIGH (ref 1.4–6.5)
NEUTROPHILS NFR BLD AUTO: 78.6 % — HIGH (ref 42.2–75.2)
NRBC # BLD: 0 /100 WBCS — SIGNIFICANT CHANGE UP (ref 0–0)
PHOSPHATE SERPL-MCNC: 7.6 MG/DL — HIGH (ref 2.1–4.9)
PLATELET # BLD AUTO: 136 K/UL — SIGNIFICANT CHANGE UP (ref 130–400)
POTASSIUM SERPL-MCNC: 4.3 MMOL/L — SIGNIFICANT CHANGE UP (ref 3.5–5)
POTASSIUM SERPL-SCNC: 4.3 MMOL/L — SIGNIFICANT CHANGE UP (ref 3.5–5)
PROT SERPL-MCNC: 6.8 G/DL — SIGNIFICANT CHANGE UP (ref 6–8)
PROTHROM AB SERPL-ACNC: 13.5 SEC — HIGH (ref 9.95–12.87)
RBC # BLD: 4.74 M/UL — SIGNIFICANT CHANGE UP (ref 4.7–6.1)
RBC # FLD: 16.6 % — HIGH (ref 11.5–14.5)
SODIUM SERPL-SCNC: 140 MMOL/L — SIGNIFICANT CHANGE UP (ref 135–146)
TSH SERPL-MCNC: 1.33 UIU/ML — SIGNIFICANT CHANGE UP (ref 0.27–4.2)
WBC # BLD: 9.72 K/UL — SIGNIFICANT CHANGE UP (ref 4.8–10.8)
WBC # FLD AUTO: 9.72 K/UL — SIGNIFICANT CHANGE UP (ref 4.8–10.8)

## 2019-03-03 RX ORDER — MIDAZOLAM HYDROCHLORIDE 1 MG/ML
4 INJECTION, SOLUTION INTRAMUSCULAR; INTRAVENOUS ONCE
Qty: 0 | Refills: 0 | Status: DISCONTINUED | OUTPATIENT
Start: 2019-03-03 | End: 2019-03-03

## 2019-03-03 RX ORDER — CEFEPIME 1 G/1
2000 INJECTION, POWDER, FOR SOLUTION INTRAMUSCULAR; INTRAVENOUS DAILY
Qty: 0 | Refills: 0 | Status: DISCONTINUED | OUTPATIENT
Start: 2019-03-04 | End: 2019-03-05

## 2019-03-03 RX ORDER — CEFEPIME 1 G/1
2000 INJECTION, POWDER, FOR SOLUTION INTRAMUSCULAR; INTRAVENOUS ONCE
Qty: 0 | Refills: 0 | Status: COMPLETED | OUTPATIENT
Start: 2019-03-03 | End: 2019-03-03

## 2019-03-03 RX ORDER — CEFEPIME 1 G/1
INJECTION, POWDER, FOR SOLUTION INTRAMUSCULAR; INTRAVENOUS
Qty: 0 | Refills: 0 | Status: DISCONTINUED | OUTPATIENT
Start: 2019-03-03 | End: 2019-03-05

## 2019-03-03 RX ORDER — MIDAZOLAM HYDROCHLORIDE 1 MG/ML
0.02 INJECTION, SOLUTION INTRAMUSCULAR; INTRAVENOUS
Qty: 100 | Refills: 0 | Status: DISCONTINUED | OUTPATIENT
Start: 2019-03-03 | End: 2019-03-07

## 2019-03-03 RX ORDER — VANCOMYCIN HCL 1 G
1000 VIAL (EA) INTRAVENOUS ONCE
Qty: 0 | Refills: 0 | Status: COMPLETED | OUTPATIENT
Start: 2019-03-03 | End: 2019-03-03

## 2019-03-03 RX ADMIN — PANTOPRAZOLE SODIUM 40 MILLIGRAM(S): 20 TABLET, DELAYED RELEASE ORAL at 05:37

## 2019-03-03 RX ADMIN — MIDODRINE HYDROCHLORIDE 10 MILLIGRAM(S): 2.5 TABLET ORAL at 22:11

## 2019-03-03 RX ADMIN — Medication 650 MILLIGRAM(S): at 20:00

## 2019-03-03 RX ADMIN — CHLORHEXIDINE GLUCONATE 1 APPLICATION(S): 213 SOLUTION TOPICAL at 05:36

## 2019-03-03 RX ADMIN — Medication 1 TABLET(S): at 05:36

## 2019-03-03 RX ADMIN — Medication 4.67 MICROGRAM(S)/KG/MIN: at 02:10

## 2019-03-03 RX ADMIN — MIDAZOLAM HYDROCHLORIDE 4 MILLIGRAM(S): 1 INJECTION, SOLUTION INTRAMUSCULAR; INTRAVENOUS at 16:36

## 2019-03-03 RX ADMIN — CEFEPIME 100 MILLIGRAM(S): 1 INJECTION, POWDER, FOR SOLUTION INTRAMUSCULAR; INTRAVENOUS at 17:23

## 2019-03-03 RX ADMIN — HEPARIN SODIUM 5000 UNIT(S): 5000 INJECTION INTRAVENOUS; SUBCUTANEOUS at 17:01

## 2019-03-03 RX ADMIN — Medication 4.67 MICROGRAM(S)/KG/MIN: at 16:43

## 2019-03-03 RX ADMIN — Medication 1 TABLET(S): at 22:11

## 2019-03-03 RX ADMIN — Medication 250 MILLIGRAM(S): at 17:23

## 2019-03-03 RX ADMIN — MIDAZOLAM HYDROCHLORIDE 1.99 MG/KG/HR: 1 INJECTION, SOLUTION INTRAMUSCULAR; INTRAVENOUS at 16:33

## 2019-03-03 RX ADMIN — MIDODRINE HYDROCHLORIDE 10 MILLIGRAM(S): 2.5 TABLET ORAL at 05:36

## 2019-03-03 RX ADMIN — HEPARIN SODIUM 5000 UNIT(S): 5000 INJECTION INTRAVENOUS; SUBCUTANEOUS at 05:36

## 2019-03-03 RX ADMIN — Medication 650 MILLIGRAM(S): at 20:30

## 2019-03-03 NOTE — PROGRESS NOTE ADULT - ASSESSMENT
63M    ESRD on HD  H/O aortic valve replacement  HX AVF infection with pseudomonas (1/2/19- s/p vanc/gent but (I) gent, 4/2018, 3/2018), R fluor    Admitted with SOB after missing HD  SIRS on admission, sepsis ruled out T35.9 P96 WBC 6  systolic Hypotension  Completed recent vanc/gent course for pseudomonal infection (pseudo was I gent)  CXR Bilateral opacities.    - f/u bcx  - empiric cefepime 2g q24h, and vanc 1g x1  - as had reported sore throat, rhinorrhea would send RVP

## 2019-03-03 NOTE — CHART NOTE - NSCHARTNOTEFT_GEN_A_CORE
patient confused and lethargic in in AM, ABG done and patient found to have hypercapnic and hypoxic respiratory failure. tried bipap 16/8 with some improvement in co2 from 94-75, was more alert, but still remained confused . ph of 7.15, patient intubated and right subclavian line placed. antibiotics started. cxr shows whitout of left lung? mucous plug. after intubation patient noted to have thick secretion from et tube.    plan  c/w sedation and mechanical ventilation  c/w antibiotics  f/u cultures  pulm eval- ?bronch if cxr doesn't improve

## 2019-03-03 NOTE — PROGRESS NOTE ADULT - SUBJECTIVE AND OBJECTIVE BOX
Patient is more lethargic today, Levophed dose increased to maintain BP      T(F): 96.4 (03-03-19 @ 11:00), Max: 97.1 (03-02-19 @ 19:01)  HR: 80 (03-03-19 @ 09:52)  BP: 104/63 (03-03-19 @ 09:52)  RR: 20 (03-03-19 @ 11:00)  SpO2: 96% (03-03-19 @ 09:52) (90% - 99%)    PHYSICAL EXAM:  GENERAL: Lethargic  HEAD:  Atraumatic, Normocephalic  NERVOUS SYSTEM:  no focal deficits  CHEST/LUNG:  bilateral rales  HEART: Regular rate and rhythm; No murmurs, rubs, or gallops  ABDOMEN: Soft, Nontender, Nondistended; Bowel sounds present  EXTREMITIES:  b/l edema le with chronic venous stasis changes / Left shoulder scar      LABS  03-03    140  |  99  |  16  ----------------------------<  102<H>  4.3   |  26  |  5.7<HH>    Ca    10.1      03 Mar 2019 06:06  Phos  7.6     03-03  Mg     2.2     03-03    TPro  6.8  /  Alb  3.9  /  TBili  0.5  /  DBili  x   /  AST  12  /  ALT  6   /  AlkPhos  112  03-03                          14.3   9.72  )-----------( 136      ( 03 Mar 2019 06:06 )             48.9     PT/INR - ( 03 Mar 2019 06:06 )   PT: 13.50 sec;   INR: 1.18 ratio         PTT - ( 03 Mar 2019 06:06 )  PTT:48.4 sec    CARDIAC ENZYMES  Creatine Kinase, Serum: 19 (03-02 @ 13:07)  Creatine Kinase, Serum: 23 (03-02 @ 08:46)    CKMB Units: 3.5 (03-02 @ 13:07)  CKMB Units: 3.8 (03-02 @ 08:46)    Troponin T, Serum: 0.09 ng/mL (03-02-19 @ 15:48)  Troponin T, Serum: 0.10 ng/mL (03-02-19 @ 13:07)  Troponin T, Serum: 0.08 ng/mL (03-02-19 @ 08:46)  Troponin T, Serum: 0.09 ng/mL (03-01-19 @ 18:10)        RADIOLOGY  < from: Xray Chest 1 View- PORTABLE-Routine (03.03.19 @ 05:35) >  Impression:      Status post a median sternotomy and cardiomegaly.    Bilateral opacities.    Stent overlies left axillary region.    < end of copied text >    MEDICATIONS  (STANDING):  calcium carbonate    500 mG (Tums) Chewable 1 Tablet(s) Chew three times a day  chlorhexidine 4% Liquid 1 Application(s) Topical <User Schedule>  heparin  Injectable 5000 Unit(s) SubCutaneous every 12 hours  midodrine 10 milliGRAM(s) Oral three times a day  norepinephrine Infusion 0.05 MICROgram(s)/kG/Min (4.669 mL/Hr) IV Continuous <Continuous>  pantoprazole    Tablet 40 milliGRAM(s) Oral before breakfast    MEDICATIONS  (PRN):  acetaminophen   Tablet .. 650 milliGRAM(s) Oral every 6 hours PRN Mild Pain (1 - 3)  oxyCODONE    IR 15 milliGRAM(s) Oral four times a day PRN Moderate Pain (4 - 6)

## 2019-03-03 NOTE — DIETITIAN INITIAL EVALUATION ADULT. - PROBLEM SELECTOR PLAN 2
suspect due to renal disease but patient has risk factors, repeat ordered and will ask cardiology to see (ALSO HAS AVR)

## 2019-03-03 NOTE — PROGRESS NOTE ADULT - SUBJECTIVE AND OBJECTIVE BOX
Patient is a 63y old  Male who presents with a chief complaint of sepsis (02 Mar 2019 12:37)      T(F): 97 (03-03-19 @ 03:00), Max: 97.1 (03-02-19 @ 19:01)  HR: 88 (03-03-19 @ 03:10)  BP: 95/55 (03-03-19 @ 05:00)  RR: 18 (03-03-19 @ 05:00)  SpO2: 95% (03-03-19 @ 03:10) (90% - 99%)    PHYSICAL EXAM:  GENERAL: NAD, well-groomed, well-developed  HEAD:  Atraumatic, Normocephalic  EYES: EOMI, PERRLA, conjunctiva and sclera clear  ENMT: No tonsillar erythema, exudates, or enlargement; Moist mucous membranes, Good dentition, No lesions  NECK: Supple, No JVD, Normal thyroid  NERVOUS SYSTEM:  Alert & Oriented X3,  Motor Strength 5/5 B/L upper and lower extremities  CHEST/LUNG: Clear to percussion bilaterally; No rales, rhonchi, wheezing, or rubs  HEART: Regular rate and rhythm; No murmurs, rubs, or gallops  ABDOMEN: Soft, Nontender, Nondistended; Bowel sounds present  EXTREMITIES:   No clubbing, cyanosis, or edema  LYMPH: No lymphadenopathy noted  SKIN: No rashes or lesions    labs  03-02    140  |  99  |  13  ----------------------------<  113<H>  3.9   |  27  |  5.1<HH>    Ca    10.3<H>      02 Mar 2019 19:11  Phos  8.1     03-02    TPro  6.5  /  Alb  3.6  /  TBili  0.4  /  DBili  x   /  AST  16  /  ALT  6   /  AlkPhos  98  03-01                          14.6   11.38 )-----------( 141      ( 02 Mar 2019 15:48 )             48.9       PT/INR - ( 01 Mar 2019 20:58 )   PT: 13.30 sec;   INR: 1.16 ratio         PTT - ( 01 Mar 2019 20:58 )  PTT:41.1 sec    Troponin T, Serum: 0.09 ng/mL <HH> (03-02-19 @ 15:48)  Creatine Kinase, Serum: 19 U/L (03-02-19 @ 13:07)  Troponin T, Serum: 0.10 ng/mL <HH> (03-02-19 @ 13:07)  Creatine Kinase, Serum: 23 U/L (03-02-19 @ 08:46)  Troponin T, Serum: 0.08 ng/mL <HH> (03-02-19 @ 08:46)      acetaminophen   Tablet .. 650 milliGRAM(s) Oral every 6 hours PRN  calcium carbonate    500 mG (Tums) Chewable 1 Tablet(s) Chew three times a day  chlorhexidine 4% Liquid 1 Application(s) Topical <User Schedule>  heparin  Injectable 5000 Unit(s) SubCutaneous every 12 hours  midodrine 10 milliGRAM(s) Oral three times a day  norepinephrine Infusion 0.05 MICROgram(s)/kG/Min IV Continuous <Continuous>  oxyCODONE    IR 15 milliGRAM(s) Oral four times a day PRN  pantoprazole    Tablet 40 milliGRAM(s) Oral before breakfast

## 2019-03-03 NOTE — PROGRESS NOTE ADULT - SUBJECTIVE AND OBJECTIVE BOX
Patient is a 63y old  Male who presents with a chief complaint of sepsis (02 Mar 2019 12:37)        Interval Events: No overnight events.    REVIEW OF SYSTEMS:  Unable to obtain due to patient's condition.        OBJECTIVE:  ICU Vital Signs Last 24 Hrs  T(C): 35.6 (03 Mar 2019 07:01), Max: 36.2 (02 Mar 2019 19:01)  T(F): 96 (03 Mar 2019 07:01), Max: 97.1 (02 Mar 2019 19:01)  HR: 77 (03 Mar 2019 09:08) (73 - 995)  BP: 90/52 (03 Mar 2019 09:08) (72/48 - 991/616)  BP(mean): 66 (03 Mar 2019 09:08) (56 - 92)  RR: 19 (03 Mar 2019 09:08) (16 - 58)  SpO2: 97% (03 Mar 2019 09:08) (90% - 99%)        03-02 @ 07:01  -  03-03 @ 07:00  --------------------------------------------------------  IN: 331 mL / OUT: 1000 mL / NET: -669 mL    03-03 @ 07:01  -  03-03 @ 09:52  --------------------------------------------------------  IN: 33 mL / OUT: 0 mL / NET: 33 mL      PHYSICAL EXAM:  General: Awake, alert,   HEENT: Atraumatic, normocephalic.                 Mallampatti Grade                 No nasal congestion.                No tonsillar or pharyngeal exudates.  Lymph Nodes: No palpable lymphadenopathy neck, supraclavicular region  Neck: No JVD. No carotid bruit, no thyromegally  Respiratory: Normal chest expansion                         Normal percussion                         Normal and equal air entry                         ++ rales.  Cardiovascular: S1 S2 normal. No murmurs, rubs or gallops.   Abdomen: Soft, non-tender, non-distended. No organomegaly.  Extremities: Warm to touch. Peripheral pulse palpable. No pedal edema.   Skin: No rashes or skin lesions, warm dry  Neurological: Motor and sensory examination equal and normal in all four extremities.  Psychiatry: Appropriate mood and affect.    HOSPITAL MEDICATIONS:  MEDICATIONS  (STANDING):  calcium carbonate    500 mG (Tums) Chewable 1 Tablet(s) Chew three times a day  chlorhexidine 4% Liquid 1 Application(s) Topical <User Schedule>  heparin  Injectable 5000 Unit(s) SubCutaneous every 12 hours  midodrine 10 milliGRAM(s) Oral three times a day  norepinephrine Infusion 0.05 MICROgram(s)/kG/Min (4.669 mL/Hr) IV Continuous <Continuous>  pantoprazole    Tablet 40 milliGRAM(s) Oral before breakfast    MEDICATIONS  (PRN):  acetaminophen   Tablet .. 650 milliGRAM(s) Oral every 6 hours PRN Mild Pain (1 - 3)  oxyCODONE    IR 15 milliGRAM(s) Oral four times a day PRN Moderate Pain (4 - 6)      LABS:                        14.3   9.72  )-----------( 136      ( 03 Mar 2019 06:06 )             48.9     03-03    140  |  99  |  16  ----------------------------<  102<H>  4.3   |  26  |  5.7<HH>    Ca    10.1      03 Mar 2019 06:06  Phos  7.6     03-03  Mg     2.2     03-03    TPro  6.8  /  Alb  3.9  /  TBili  0.5  /  DBili  x   /  AST  12  /  ALT  6   /  AlkPhos  112  03-03    PT/INR - ( 03 Mar 2019 06:06 )   PT: 13.50 sec;   INR: 1.18 ratio         PTT - ( 03 Mar 2019 06:06 )  PTT:48.4 sec      Venous Blood Gas:  03-01 @ 20:40  7.18/69/50/26/79  VBG Lactate: 0.8              RADIOLOGY: Radiology personally reviewed.

## 2019-03-03 NOTE — PROGRESS NOTE ADULT - ASSESSMENT
63/M with ESRD on HD MWF, AVR, low back pain, recent AVF infection, chronic back pain presents with weakness and inability to walk, missed his HD on 3/1.    ESRD - HD was yesterday  -UF was 1 L with Levophed  remains on Levo    Hypercalcemia likely secondary hyperparathyroidism -  phos is high  iPTH pending  start REnagel 800 mg 2 tabs po with meals tid    CXR reviewed - b/l pl effusions and PVC  CHF - very high BNP    Hypotension - start Midodrine 10 mg q8hr  - did not tolerate Dopamin->chest pain    sepsis is to be ruled out - BCx hopefully pending  pt would need PRETTY to r/o endocarditis  left axillary stent noted on CXr  - possible Dago cath inf  - pt was recently on Vanco and Genta, would not cont the same  -check cortisol and TSH as well for hypotension    will follow

## 2019-03-03 NOTE — PROGRESS NOTE ADULT - ASSESSMENT
Patient stated that he would call at a later date to set his follow up in August or Sept.   IMPRESSION:  ?? sepsis septic shock / hypotension   ESRD   history of graft infection pseudomonas   pulmonary edema fluid over load     PLAN:    CNS: no sedation     HEENT:  oral care   PULMONARY: keep pox > 92 %     CARDIOVASCULAR: start levophid SBP 80 and need to start HD   echo   cardiology follow up  keep IS < OS     GI: GI prophylaxis.  Feeding     RENAL: HD today follow renal and lytes keep Is < OS     INFECTIOUS DISEASE: off abx as per ID   follow cx for now     HEMATOLOGICAL:  DVT prophylaxis.    ENDOCRINE:  Follow up FS.  Insulin protocol if needed.    MUSCULOSKELETAL:    cont. ICU Monitoring

## 2019-03-03 NOTE — AIRWAY PLACEMENT NOTE ADULT - POST AIRWAY PLACEMENT ASSESSMENT:
breath sounds bilateral/breath sounds equal/100% O2 sat/positive end tidal CO2 noted/CXR pending/chest excursion noted

## 2019-03-03 NOTE — PROVIDER CONTACT NOTE (OTHER) - BACKGROUND
Pt has bee n lethargic since arrival to unit, vs stable, pox 96% on o2, pt alert and able to correctly answer questions, however pt falls back to sleep.

## 2019-03-03 NOTE — PROGRESS NOTE ADULT - SUBJECTIVE AND OBJECTIVE BOX
Nephrology progress note    Patient is seen and examined, events over the last 24 h noted .  s/p HD with 1 L UF on LEvophed  Allergies:  Gluten (Unknown)  naproxen (Other)  penicillin (Rash)    Hospital Medications:   MEDICATIONS  (STANDING):  calcium carbonate    500 mG (Tums) Chewable 1 Tablet(s) Chew three times a day  chlorhexidine 4% Liquid 1 Application(s) Topical <User Schedule>  heparin  Injectable 5000 Unit(s) SubCutaneous every 12 hours  midodrine 10 milliGRAM(s) Oral three times a day  norepinephrine Infusion 0.05 MICROgram(s)/kG/Min (4.669 mL/Hr) IV Continuous <Continuous>  pantoprazole    Tablet 40 milliGRAM(s) Oral before breakfast        VITALS:  T(F): 96 (03-03-19 @ 07:01), Max: 97.1 (03-02-19 @ 19:01)  HR: 88 (03-03-19 @ 03:10)  BP: 95/55 (03-03-19 @ 05:00)  RR: 16 (03-03-19 @ 07:01)  SpO2: 95% (03-03-19 @ 03:10)  Wt(kg): --    03-01 @ 07:01  -  03-02 @ 07:00  --------------------------------------------------------  IN: 500 mL / OUT: 0 mL / NET: 500 mL    03-02 @ 07:01  -  03-03 @ 07:00  --------------------------------------------------------  IN: 316 mL / OUT: 1000 mL / NET: -684 mL          PHYSICAL EXAM:  Constitutional: NAD  HEENT: anicteric sclera, MMM  Neck: No JVD  Respiratory: CTAB  Cardiovascular: S1, S2, RRR  Gastrointestinal: BS+, soft, NT/ND  Extremities: . No peripheral edema  Neurological: A/O x 3  : No CVA tenderness. No rosenberg.   Skin: No rashes  Vascular Access: femoral Dago cath    LABS:  03-02    140  |  99  |  13  ----------------------------<  113<H>  3.9   |  27  |  5.1<HH>    Ca    10.3<H>      02 Mar 2019 19:11  Phos  8.1     03-02    TPro  6.5  /  Alb  3.6  /  TBili  0.4  /  DBili      /  AST  16  /  ALT  6   /  AlkPhos  98  03-01                          14.6   11.38 )-----------( 141      ( 02 Mar 2019 15:48 )             48.9       Urine Studies:      RADIOLOGY & ADDITIONAL STUDIES:  < from: Xray Chest 1 View- PORTABLE-Routine (03.03.19 @ 05:35) >  Stent overlies left axillary region.    No significant change from March 2, 2019.    < end of copied text >

## 2019-03-03 NOTE — DIETITIAN INITIAL EVALUATION ADULT. - OTHER INFO
RD screen 2/2 skin breakdown. Pt with hx of ESRD, + HD, AVR presents with worsening SOB, missed HD session found to be hypotensive, r/o sepsis, PRETTY to be done to r/o endocarditis. s/p HD on 3/2. pt is lethargic today. spoke with family at bedside, believes pt was developing an intolerance to gluten PTA and therefore is requesting pt remain on GF diet, states symptoms with consumption included congestion. pt also consumed nepro PTA to help meet estimated needs 2/2 alteration in taste.

## 2019-03-04 DIAGNOSIS — J96.00 ACUTE RESPIRATORY FAILURE, UNSPECIFIED WHETHER WITH HYPOXIA OR HYPERCAPNIA: ICD-10-CM

## 2019-03-04 DIAGNOSIS — Z02.9 ENCOUNTER FOR ADMINISTRATIVE EXAMINATIONS, UNSPECIFIED: ICD-10-CM

## 2019-03-04 DIAGNOSIS — A41.9 SEPSIS, UNSPECIFIED ORGANISM: ICD-10-CM

## 2019-03-04 LAB
ALBUMIN SERPL ELPH-MCNC: 3.5 G/DL — SIGNIFICANT CHANGE UP (ref 3.5–5.2)
ALP SERPL-CCNC: 98 U/L — SIGNIFICANT CHANGE UP (ref 30–115)
ALT FLD-CCNC: 5 U/L — SIGNIFICANT CHANGE UP (ref 0–41)
ANION GAP SERPL CALC-SCNC: 18 MMOL/L — HIGH (ref 7–14)
AST SERPL-CCNC: 12 U/L — SIGNIFICANT CHANGE UP (ref 0–41)
BASE EXCESS BLDA CALC-SCNC: -2.8 MMOL/L — LOW (ref -2–2)
BASOPHILS # BLD AUTO: 0 K/UL — SIGNIFICANT CHANGE UP (ref 0–0.2)
BASOPHILS NFR BLD AUTO: 0 % — SIGNIFICANT CHANGE UP (ref 0–1)
BILIRUB SERPL-MCNC: 0.8 MG/DL — SIGNIFICANT CHANGE UP (ref 0.2–1.2)
BUN SERPL-MCNC: 28 MG/DL — HIGH (ref 10–20)
CALCIUM SERPL-MCNC: 10.3 MG/DL — HIGH (ref 8.5–10.1)
CHLORIDE SERPL-SCNC: 101 MMOL/L — SIGNIFICANT CHANGE UP (ref 98–110)
CO2 SERPL-SCNC: 21 MMOL/L — SIGNIFICANT CHANGE UP (ref 17–32)
CREAT SERPL-MCNC: 6.6 MG/DL — CRITICAL HIGH (ref 0.7–1.5)
EOSINOPHIL # BLD AUTO: 0.11 K/UL — SIGNIFICANT CHANGE UP (ref 0–0.7)
EOSINOPHIL NFR BLD AUTO: 1 % — SIGNIFICANT CHANGE UP (ref 0–8)
ERYTHROCYTE [SEDIMENTATION RATE] IN BLOOD: 9 MM/HR — SIGNIFICANT CHANGE UP (ref 0–10)
GLUCOSE SERPL-MCNC: 109 MG/DL — HIGH (ref 70–99)
HCO3 BLDA-SCNC: 23 MMOL/L — SIGNIFICANT CHANGE UP (ref 23–27)
HCT VFR BLD CALC: 45.6 % — SIGNIFICANT CHANGE UP (ref 42–52)
HGB BLD-MCNC: 13.8 G/DL — LOW (ref 14–18)
HOROWITZ INDEX BLDA+IHG-RTO: 40 — SIGNIFICANT CHANGE UP
LYMPHOCYTES # BLD AUTO: 0.78 K/UL — LOW (ref 1.2–3.4)
LYMPHOCYTES # BLD AUTO: 7 % — LOW (ref 20.5–51.1)
MAGNESIUM SERPL-MCNC: 2 MG/DL — SIGNIFICANT CHANGE UP (ref 1.8–2.4)
MCHC RBC-ENTMCNC: 30.3 G/DL — LOW (ref 32–37)
MCHC RBC-ENTMCNC: 30.3 PG — SIGNIFICANT CHANGE UP (ref 27–31)
MCV RBC AUTO: 100 FL — HIGH (ref 80–94)
MONOCYTES # BLD AUTO: 1.79 K/UL — HIGH (ref 0.1–0.6)
MONOCYTES NFR BLD AUTO: 16 % — HIGH (ref 1.7–9.3)
MRSA PCR RESULT.: NEGATIVE — SIGNIFICANT CHANGE UP
NEUTROPHILS # BLD AUTO: 8.39 K/UL — HIGH (ref 1.4–6.5)
NEUTROPHILS NFR BLD AUTO: 75 % — SIGNIFICANT CHANGE UP (ref 42.2–75.2)
NRBC # BLD: SIGNIFICANT CHANGE UP /100 WBCS (ref 0–0)
PCO2 BLDA: 44 MMHG — HIGH (ref 38–42)
PH BLDA: 7.33 — LOW (ref 7.38–7.42)
PHOSPHATE SERPL-MCNC: 4.9 MG/DL — SIGNIFICANT CHANGE UP (ref 2.1–4.9)
PLATELET # BLD AUTO: 125 K/UL — LOW (ref 130–400)
PO2 BLDA: 71 MMHG — LOW (ref 78–95)
POTASSIUM SERPL-MCNC: 4 MMOL/L — SIGNIFICANT CHANGE UP (ref 3.5–5)
POTASSIUM SERPL-SCNC: 4 MMOL/L — SIGNIFICANT CHANGE UP (ref 3.5–5)
PROT SERPL-MCNC: 6 G/DL — SIGNIFICANT CHANGE UP (ref 6–8)
PTH-INTACT FLD-MCNC: 192 PG/ML — HIGH (ref 15–65)
RBC # BLD: 4.56 M/UL — LOW (ref 4.7–6.1)
RBC # FLD: 16.5 % — HIGH (ref 11.5–14.5)
SAO2 % BLDA: 94 % — SIGNIFICANT CHANGE UP (ref 94–98)
SODIUM SERPL-SCNC: 140 MMOL/L — SIGNIFICANT CHANGE UP (ref 135–146)
WBC # BLD: 11.19 K/UL — HIGH (ref 4.8–10.8)
WBC # FLD AUTO: 11.19 K/UL — HIGH (ref 4.8–10.8)

## 2019-03-04 RX ORDER — PANTOPRAZOLE SODIUM 20 MG/1
40 TABLET, DELAYED RELEASE ORAL DAILY
Qty: 0 | Refills: 0 | Status: DISCONTINUED | OUTPATIENT
Start: 2019-03-04 | End: 2019-03-09

## 2019-03-04 RX ORDER — SEVELAMER CARBONATE 2400 MG/1
1600 POWDER, FOR SUSPENSION ORAL
Qty: 0 | Refills: 0 | Status: DISCONTINUED | OUTPATIENT
Start: 2019-03-04 | End: 2019-03-09

## 2019-03-04 RX ADMIN — MIDODRINE HYDROCHLORIDE 10 MILLIGRAM(S): 2.5 TABLET ORAL at 06:28

## 2019-03-04 RX ADMIN — PANTOPRAZOLE SODIUM 40 MILLIGRAM(S): 20 TABLET, DELAYED RELEASE ORAL at 15:35

## 2019-03-04 RX ADMIN — MIDAZOLAM HYDROCHLORIDE 1.99 MG/KG/HR: 1 INJECTION, SOLUTION INTRAMUSCULAR; INTRAVENOUS at 07:41

## 2019-03-04 RX ADMIN — Medication 650 MILLIGRAM(S): at 02:30

## 2019-03-04 RX ADMIN — Medication 4.67 MICROGRAM(S)/KG/MIN: at 01:34

## 2019-03-04 RX ADMIN — SEVELAMER CARBONATE 1600 MILLIGRAM(S): 2400 POWDER, FOR SUSPENSION ORAL at 18:33

## 2019-03-04 RX ADMIN — CEFEPIME 100 MILLIGRAM(S): 1 INJECTION, POWDER, FOR SOLUTION INTRAMUSCULAR; INTRAVENOUS at 15:31

## 2019-03-04 RX ADMIN — Medication 4.67 MICROGRAM(S)/KG/MIN: at 07:40

## 2019-03-04 RX ADMIN — CHLORHEXIDINE GLUCONATE 1 APPLICATION(S): 213 SOLUTION TOPICAL at 02:00

## 2019-03-04 RX ADMIN — MIDODRINE HYDROCHLORIDE 10 MILLIGRAM(S): 2.5 TABLET ORAL at 21:17

## 2019-03-04 RX ADMIN — MIDAZOLAM HYDROCHLORIDE 1.99 MG/KG/HR: 1 INJECTION, SOLUTION INTRAMUSCULAR; INTRAVENOUS at 07:01

## 2019-03-04 RX ADMIN — HEPARIN SODIUM 5000 UNIT(S): 5000 INJECTION INTRAVENOUS; SUBCUTANEOUS at 06:27

## 2019-03-04 RX ADMIN — MIDAZOLAM HYDROCHLORIDE 1.99 MG/KG/HR: 1 INJECTION, SOLUTION INTRAMUSCULAR; INTRAVENOUS at 01:34

## 2019-03-04 RX ADMIN — HEPARIN SODIUM 5000 UNIT(S): 5000 INJECTION INTRAVENOUS; SUBCUTANEOUS at 18:33

## 2019-03-04 RX ADMIN — Medication 1 TABLET(S): at 06:27

## 2019-03-04 RX ADMIN — Medication 1 TABLET(S): at 21:17

## 2019-03-04 RX ADMIN — Medication 650 MILLIGRAM(S): at 01:29

## 2019-03-04 RX ADMIN — Medication 4.67 MICROGRAM(S)/KG/MIN: at 06:56

## 2019-03-04 RX ADMIN — Medication 650 MILLIGRAM(S): at 18:57

## 2019-03-04 NOTE — PROGRESS NOTE ADULT - ATTENDING COMMENTS
Attending Statement: I have personally performed a face to face diagnostic evaluation on this patient. I have written the above note pertaining to the history, exam and plan of care. Attending Statement: I have personally performed a face to face diagnostic evaluation on this patient. I have written the above note pertaining to the history, exam and plan of care .

## 2019-03-04 NOTE — CHART NOTE - NSCHARTNOTEFT_GEN_A_CORE
ABG was requested per MD Looney during rounds, ABG drawn from right bracheal artery since right radial had nothing palpable.   Patient bled excessively I requested RN Luci to come in the room and bring me additional gauze since mine was saturated. RN Luci realized the patient had an old AV Fistula and told me that arm was supposed to be on precautions. Patient had no arm precautions sign anywhere in the room, patient also did not have an arm precaution bracelet on. Upon bringing this to Luci's attention a sign was created and a bracelet was added to the patient. Nurse manager Alma Bearden made aware as was MD Looney.   Patient has BILATERAL arm precautions and a UDAL placed in the right femoral artery. Arterial line placed in the left femoral artery for blood pressure readings and ABG sampling.     -Yasmeen Hoffman, RRT

## 2019-03-04 NOTE — PROGRESS NOTE ADULT - ASSESSMENT
63/M with ESRD on HD MWF, AVR, low back pain, recent AVF infection, chronic back pain presents with weakness and inability to walk, missed his HD on 3/1. Spiked to 103 last night, started on Cefipime.    Hypercapnic respiratory failure - pCO2 -90, had to be intubate last night.    ESRD - HD was on Saturday, no need for HD today  -UF was 1 L with Levophed  remains on Levo    Hypercalcemia likely secondary hyperparathyroidism -  phos is high  iPTH pending  start REnagel 800 mg 2 tabs po with meals tid    CXR reviewed - b/l pl effusions and PVC  CHF - very high BNP    Hypotension due to sepsis - source - likely Lt AVF and sinus tract infection  there is an old stent in the axillary artery - possible infection Or infected Rt groin As cath  - cont Cefipime - renal dose please  - repeat BCx   - called Dr Baig to possibly do exploration of left chest/arm area r/o collection    pt would need 2D Echo/PRETTY to r/o endocarditis    -check cortisol and TSH as well for hypotension    D/W ICU staff, will call wife

## 2019-03-04 NOTE — PROGRESS NOTE ADULT - ASSESSMENT
IMPRESSION:  Septic shock  ESRD   history of graft infection pseudomonas   pulmonary edema volume over load     PLAN:    CNS: no sedation     HEENT:  oral care     PULMONARY: keep pox > 92 %. Obtain ABG STAT    CARDIOVASCULAR:   Taper pressors   Keep MAP >65  f/u 2d echo  cardiology follow up  keep IS < OS     GI: GI prophylaxis.  Feeding     RENAL: F/U renal. F/U lytes    INFECTIOUS DISEASE: c/w cefepime abx as per ID   follow cx for now   RVP  Possible OR today for AVF site debridement    HEMATOLOGICAL:  DVT prophylaxis.    ENDOCRINE:  Follow up FS.  Insulin protocol if needed.    MUSCULOSKELETAL: Bedrest    cont. ICU Monitoring

## 2019-03-04 NOTE — PROGRESS NOTE ADULT - SUBJECTIVE AND OBJECTIVE BOX
Nephrology progress note    Patient is seen and examined, events over the last 24 h noted .  Pt spiked to 103 last night.  had CO2 retention with pCO2 to 90 and was intubated. On IV Levophed    Allergies:  Gluten (Unknown)  naproxen (Other)  penicillin (Rash)    Hospital Medications:   MEDICATIONS  (STANDING):  calcium carbonate    500 mG (Tums) Chewable 1 Tablet(s) Chew three times a day  cefepime   IVPB 2000 milliGRAM(s) IV Intermittent daily  cefepime   IVPB      chlorhexidine 4% Liquid 1 Application(s) Topical <User Schedule>  heparin  Injectable 5000 Unit(s) SubCutaneous every 12 hours  midazolam Infusion 0.02 mG/kG/Hr (1.992 mL/Hr) IV Continuous <Continuous>  midodrine 10 milliGRAM(s) Oral three times a day  norepinephrine Infusion 0.05 MICROgram(s)/kG/Min (4.669 mL/Hr) IV Continuous <Continuous>  pantoprazole    Tablet 40 milliGRAM(s) Oral before breakfast        VITALS:  T(F): 80 (03-04-19 @ 07:05), Max: 103.2 (03-04-19 @ 02:50)  HR: 80 (03-04-19 @ 07:30)  BP: 110/57 (03-04-19 @ 09:02)  RR: 24 (03-04-19 @ 09:02)  SpO2: 98% (03-04-19 @ 07:30)  Wt(kg): --    03-02 @ 07:01  -  03-03 @ 07:00  --------------------------------------------------------  IN: 331 mL / OUT: 1000 mL / NET: -669 mL    03-03 @ 07:01  -  03-04 @ 07:00  --------------------------------------------------------  IN: 1764.4 mL / OUT: 0 mL / NET: 1764.4 mL    03-04 @ 07:01 - 03-04 @ 09:39  --------------------------------------------------------  IN: 358.2 mL / OUT: 0 mL / NET: 358.2 mL          PHYSICAL EXAM:  Constitutional: NAD, on vent  HEENT: anicteric sclera, oropharynx clear, MMM  Neck: No JVD  Respiratory: CTAB, no wheezes, rales or rhonchi  Cardiovascular: S1, S2, RRR  Gastrointestinal: BS+, soft, NT/ND  Extremities:  No peripheral edema  Neurological: unresponsive  : No CVA tenderness. No rosenberg.   Skin: Lt arm and chest post op scar with purulent secretions from the suture line  Vascular Access: Rt fem Dago cath    LABS:  03-04    140  |  101  |  28<H>  ----------------------------<  109<H>  4.0   |  21  |  6.6<HH>    Ca    10.3<H>      04 Mar 2019 06:19  Phos  4.9     03-04  Mg     2.0     03-04    TPro  6.0  /  Alb  3.5  /  TBili  0.8  /  DBili      /  AST  12  /  ALT  5   /  AlkPhos  98  03-04                          13.8   11.19 )-----------( 125      ( 04 Mar 2019 06:19 )             45.6       Urine Studies:      RADIOLOGY & ADDITIONAL STUDIES:

## 2019-03-04 NOTE — PROGRESS NOTE ADULT - SUBJECTIVE AND OBJECTIVE BOX
Patient now sedated on vent, on pressors, events noted      T(F): 80 (03-04-19 @ 07:05), Max: 103.2 (03-04-19 @ 02:50)  HR: 80 (03-04-19 @ 07:30)  BP: 110/57 (03-04-19 @ 09:02)  RR: 24 (03-04-19 @ 09:02)  SpO2: 98% (03-04-19 @ 07:30) (90% - 100%)    PHYSICAL EXAM:  GENERAL: NAD  HEAD:  Atraumatic, Normocephalic  NERVOUS SYSTEM:  no focal deficits  CHEST/LUNG:  bilateral rhonchi  HEART: Regular rate and rhythm; No murmurs, rubs, or gallops  ABDOMEN: Soft, Nontender, Nondistended; Bowel sounds present  EXTREMITIES: left shoulder erythema surrounding scar    LABS  03-04    140  |  101  |  28<H>  ----------------------------<  109<H>  4.0   |  21  |  6.6<HH>    Ca    10.3<H>      04 Mar 2019 06:19  Phos  4.9     03-04  Mg     2.0     03-04    TPro  6.0  /  Alb  3.5  /  TBili  0.8  /  DBili  x   /  AST  12  /  ALT  5   /  AlkPhos  98  03-04                          13.8   11.19 )-----------( 125      ( 04 Mar 2019 06:19 )             45.6     PT/INR - ( 03 Mar 2019 06:06 )   PT: 13.50 sec;   INR: 1.18 ratio       Blood Gas Profile - Arterial (03.03.19 @ 15:54)    pH, Arterial: 7.15    pCO2, Arterial: 76 mmHg    pO2, Arterial: 86 mmHg    HCO3, Arterial: 26 mmoL/L    Base Excess, Arterial: -4.6 mmoL/L    Oxygen Saturation, Arterial: 96 %    FIO2, Arterial: 21      PTT - ( 03 Mar 2019 06:06 )  PTT:48.4 sec  Mode: Auto Mode: PRVC/ Volume Support  RR (machine): 24  TV (machine): 450  FiO2: 40  PEEP: 5  MAP: 12  PIP: 27    CARDIAC ENZYMES  Creatine Kinase, Serum: 19 (03-02 @ 13:07)  Creatine Kinase, Serum: 23 (03-02 @ 08:46)    CKMB Units: 3.5 (03-02 @ 13:07)  CKMB Units: 3.8 (03-02 @ 08:46)    Troponin T, Serum: 0.09 ng/mL (03-02-19 @ 15:48)  Troponin T, Serum: 0.10 ng/mL (03-02-19 @ 13:07)  Troponin T, Serum: 0.08 ng/mL (03-02-19 @ 08:46)  Troponin T, Serum: 0.09 ng/mL (03-01-19 @ 18:10)      Culture Results:   No growth to date. (03-02-19)    RADIOLOGY  < from: Xray Chest 1 View- PORTABLE-Routine (03.04.19 @ 06:39) >  Impression:      Improved aeration to left lung with residual left mid to lower lung field   opacity.    Stable right basilar opacity.    Bilateral interstitial opacities      < end of copied text >    MEDICATIONS  (STANDING):  calcium carbonate    500 mG (Tums) Chewable 1 Tablet(s) Chew three times a day  cefepime   IVPB 2000 milliGRAM(s) IV Intermittent daily     chlorhexidine 4% Liquid 1 Application(s) Topical <User Schedule>  heparin  Injectable 5000 Unit(s) SubCutaneous every 12 hours  midazolam Infusion 0.02 mG/kG/Hr (1.992 mL/Hr) IV Continuous <Continuous>  midodrine 10 milliGRAM(s) Oral three times a day  norepinephrine Infusion 0.05 MICROgram(s)/kG/Min (4.669 mL/Hr) IV Continuous <Continuous>  pantoprazole    Tablet 40 milliGRAM(s) Oral before breakfast    MEDICATIONS  (PRN):  acetaminophen   Tablet .. 650 milliGRAM(s) Oral every 6 hours PRN Mild Pain (1 - 3)  oxyCODONE    IR 15 milliGRAM(s) Oral four times a day PRN Moderate Pain (4 - 6)

## 2019-03-04 NOTE — PROGRESS NOTE ADULT - SUBJECTIVE AND OBJECTIVE BOX
Patient is a 63y old  Male who presents with a chief complaint of Respiratory failure (04 Mar 2019 07:41)      T(F): 80 (03-04-19 @ 07:05), Max: 103.2 (03-04-19 @ 02:50)  HR: 80 (03-04-19 @ 07:30)  BP: 103/72 (03-04-19 @ 07:30)  RR: 24 (03-04-19 @ 07:30)  SpO2: 98% (03-04-19 @ 07:30) (90% - 100%)    PHYSICAL EXAM:  GENERAL: NAD, well-groomed, well-developed  HEAD:  Atraumatic, Normocephalic  EYES: EOMI, PERRLA, conjunctiva and sclera clear  ENMT: No tonsillar erythema, exudates, or enlargement; Moist mucous membranes, Good dentition, No lesions  NECK: Supple, No JVD, Normal thyroid  NERVOUS SYSTEM:  Alert & Oriented X3,  Motor Strength 5/5 B/L upper and lower extremities  CHEST/LUNG: Clear to percussion bilaterally; No rales, rhonchi, wheezing, or rubs  HEART: Regular rate and rhythm; III/VI TRESSA LSB  ABDOMEN: Soft, Nontender, Nondistended; Bowel sounds present  EXTREMITIES:   No clubbing, cyanosis, or edema  LYMPH: No lymphadenopathy noted  SKIN: No rashes or lesions    labs  03-03    140  |  99  |  16  ----------------------------<  102<H>  4.3   |  26  |  5.7<HH>    Ca    10.1      03 Mar 2019 06:06  Phos  7.6     03-03  Mg     2.2     03-03    TPro  6.8  /  Alb  3.9  /  TBili  0.5  /  DBili  x   /  AST  12  /  ALT  6   /  AlkPhos  112  03-03                          13.8   11.19 )-----------( 125      ( 04 Mar 2019 06:19 )             45.6       Culture - Blood (collected 02 Mar 2019 08:46)  Source: .Blood None  Preliminary Report (03 Mar 2019 20:01):    No growth to date.      PT/INR - ( 03 Mar 2019 06:06 )   PT: 13.50 sec;   INR: 1.18 ratio         PTT - ( 03 Mar 2019 06:06 )  PTT:48.4 sec  Mode: Auto Mode: PRVC/ Volume Support  RR (machine): 24  TV (machine): 450  FiO2: 40  PEEP: 5  MAP: 12  PIP: 27        acetaminophen   Tablet .. 650 milliGRAM(s) Oral every 6 hours PRN  calcium carbonate    500 mG (Tums) Chewable 1 Tablet(s) Chew three times a day  cefepime   IVPB 2000 milliGRAM(s) IV Intermittent daily  cefepime   IVPB      chlorhexidine 4% Liquid 1 Application(s) Topical <User Schedule>  heparin  Injectable 5000 Unit(s) SubCutaneous every 12 hours  midazolam Infusion 0.02 mG/kG/Hr IV Continuous <Continuous>  midodrine 10 milliGRAM(s) Oral three times a day  norepinephrine Infusion 0.05 MICROgram(s)/kG/Min IV Continuous <Continuous>  oxyCODONE    IR 15 milliGRAM(s) Oral four times a day PRN  pantoprazole    Tablet 40 milliGRAM(s) Oral before breakfast

## 2019-03-04 NOTE — PROCEDURE NOTE - NSPROCDETAILS_GEN_ALL_CORE
location identified, draped/prepped, sterile technique used, needle inserted/introduced/positive blood return obtained via catheter/connected to a pressurized flush line/sutured in place/ultrasound guidance

## 2019-03-04 NOTE — PROGRESS NOTE ADULT - SUBJECTIVE AND OBJECTIVE BOX
Patient is a 63y old  Male who presents with a chief complaint of SOB (04 Mar 2019 11:28)      INTERVAL HPI/OVERNIGHT EVENTS:    MEDICATIONS  (STANDING):  calcium carbonate    500 mG (Tums) Chewable 1 Tablet(s) Chew three times a day  cefepime   IVPB 2000 milliGRAM(s) IV Intermittent daily  cefepime   IVPB      chlorhexidine 4% Liquid 1 Application(s) Topical <User Schedule>  heparin  Injectable 5000 Unit(s) SubCutaneous every 12 hours  midazolam Infusion 0.02 mG/kG/Hr (1.992 mL/Hr) IV Continuous <Continuous>  midodrine 10 milliGRAM(s) Oral three times a day  norepinephrine Infusion 0.05 MICROgram(s)/kG/Min (4.669 mL/Hr) IV Continuous <Continuous>  pantoprazole   Suspension 40 milliGRAM(s) Oral daily  sevelamer hydrochloride 1600 milliGRAM(s) Oral three times a day with meals    MEDICATIONS  (PRN):  acetaminophen   Tablet .. 650 milliGRAM(s) Oral every 6 hours PRN Mild Pain (1 - 3)  oxyCODONE    IR 15 milliGRAM(s) Oral four times a day PRN Moderate Pain (4 - 6)      Allergies    Gluten (Unknown)  naproxen (Other)  penicillin (Rash)    Intolerances        REVIEW OF SYSTEMS:  CONSTITUTIONAL: No fever, weight loss, or fatigue  EYES: No eye pain, visual disturbances, or discharge  ENMT:  No difficulty hearing, tinnitus, vertigo; No sinus or throat pain  NECK: No pain or stiffness  BREASTS: No pain, masses, or nipple discharge  RESPIRATORY: No cough, wheezing, chills or hemoptysis; No shortness of breath  CARDIOVASCULAR: No chest pain, palpitations, dizziness, or leg swelling  GASTROINTESTINAL: No abdominal or epigastric pain. No nausea, vomiting, or hematemesis; No diarrhea or constipation. No melena or hematochezia.  GENITOURINARY: No dysuria, frequency, hematuria, or incontinence  NEUROLOGICAL: No headaches, memory loss, loss of strength, numbness, or tremors  SKIN: No itching, burning, rashes, or lesions   LYMPH NODES: No enlarged glands  ENDOCRINE: No heat or cold intolerance; No hair loss  MUSCULOSKELETAL: No joint pain or swelling; No muscle, back, or extremity pain  PSYCHIATRIC: No depression, anxiety, mood swings, or difficulty sleeping  HEME/LYMPH: No easy bruising, or bleeding gums  ALLERGY AND IMMUNOLOGIC: No hives or eczema    Vital Signs Last 24 Hrs  T(C): 38.6 (04 Mar 2019 21:00), Max: 39.6 (04 Mar 2019 02:50)  T(F): 101.5 (04 Mar 2019 21:00), Max: 103.2 (04 Mar 2019 02:50)  HR: 82 (04 Mar 2019 21:00) (24 - 99)  BP: 130/70 (04 Mar 2019 11:10) (92/58 - 130/70)  BP(mean): 91 (04 Mar 2019 11:10) (70 - 92)  RR: 27 (04 Mar 2019 21:00) (22 - 30)  SpO2: 95% (04 Mar 2019 21:00) (91% - 100%)    PHYSICAL EXAM:  GENERAL: NAD, well-groomed, well-developed  HEAD:  Atraumatic, Normocephalic  EYES: EOMI, PERRLA, conjunctiva and sclera clear  ENMT: No tonsillar erythema, exudates, or enlargement; Moist mucous membranes, Good dentition, No lesions  NECK: Supple, No JVD, Normal thyroid  NERVOUS SYSTEM:  Alert & Oriented X3, Good concentration; Motor Strength 5/5 B/L upper and lower extremities; DTRs 2+ intact and symmetric  CHEST/LUNG: Clear to percussion bilaterally; No rales, rhonchi, wheezing, or rubs  HEART: Regular rate and rhythm; No murmurs, rubs, or gallops  ABDOMEN: Soft, Nontender, Nondistended; Bowel sounds present  EXTREMITIES:  2+ Peripheral Pulses, No clubbing, cyanosis, or edema  LYMPH: No lymphadenopathy noted  SKIN: No rashes or lesions    LABS:                        13.8   11.19 )-----------( 125      ( 04 Mar 2019 06:19 )             45.6     03-04    140  |  101  |  28<H>  ----------------------------<  109<H>  4.0   |  21  |  6.6<HH>    Ca    10.3<H>      04 Mar 2019 06:19  Phos  4.9     03-04  Mg     2.0     03-04    TPro  6.0  /  Alb  3.5  /  TBili  0.8  /  DBili  x   /  AST  12  /  ALT  5   /  AlkPhos  98  03-04    PT/INR - ( 03 Mar 2019 06:06 )   PT: 13.50 sec;   INR: 1.18 ratio         PTT - ( 03 Mar 2019 06:06 )  PTT:48.4 sec    CAPILLARY BLOOD GLUCOSE          RADIOLOGY & ADDITIONAL TESTS:    Imaging Personally Reviewed:  [ ] YES  [ ] NO    Consultant(s) Notes Reviewed:  [ ] YES  [ ] NO    Care Discussed with Consultants/Other Providers [ ] YES  [ ] NO Patient is a 63y old  Male who presents with a chief complaint of SOB (04 Mar 2019 11:28)      INTERVAL HPI/OVERNIGHT EVENTS:    MEDICATIONS  (STANDING):  calcium carbonate    500 mG (Tums) Chewable 1 Tablet(s) Chew three times a day  cefepime   IVPB 2000 milliGRAM(s) IV Intermittent daily  cefepime   IVPB      chlorhexidine 4% Liquid 1 Application(s) Topical <User Schedule>  heparin  Injectable 5000 Unit(s) SubCutaneous every 12 hours  midazolam Infusion 0.02 mG/kG/Hr (1.992 mL/Hr) IV Continuous <Continuous>  midodrine 10 milliGRAM(s) Oral three times a day  norepinephrine Infusion 0.05 MICROgram(s)/kG/Min (4.669 mL/Hr) IV Continuous <Continuous>  pantoprazole   Suspension 40 milliGRAM(s) Oral daily  sevelamer hydrochloride 1600 milliGRAM(s) Oral three times a day with meals    MEDICATIONS  (PRN):  acetaminophen   Tablet .. 650 milliGRAM(s) Oral every 6 hours PRN Mild Pain (1 - 3)  oxyCODONE    IR 15 milliGRAM(s) Oral four times a day PRN Moderate Pain (4 - 6)      Allergies    Gluten (Unknown)  naproxen (Other)  penicillin (Rash)    Intolerances        REVIEW OF SYSTEMS:      Vital Signs Last 24 Hrs  T(C): 38.6 (04 Mar 2019 21:00), Max: 39.6 (04 Mar 2019 02:50)  T(F): 101.5 (04 Mar 2019 21:00), Max: 103.2 (04 Mar 2019 02:50)  HR: 82 (04 Mar 2019 21:00) (24 - 99)  BP: 130/70 (04 Mar 2019 11:10) (92/58 - 130/70)  BP(mean): 91 (04 Mar 2019 11:10) (70 - 92)  RR: 27 (04 Mar 2019 21:00) (22 - 30)  SpO2: 95% (04 Mar 2019 21:00) (91% - 100%)    PHYSICAL EXAM:  GENERAL: Intubated/vented  CHEST/LUNG: Air entry  bilaterally  HEART: s1 and s2 present   ABDOMEN:  Nontender and  Nondistended  EXTREMITIES:  Left upper arm AVF site has drainage from ? sinus track  CNS: Intubated/vented        LABS:                        13.8   11.19 )-----------( 125      ( 04 Mar 2019 06:19 )             45.6     03-04    140  |  101  |  28<H>  ----------------------------<  109<H>  4.0   |  21  |  6.6<HH>    Ca    10.3<H>      04 Mar 2019 06:19  Phos  4.9     03-04  Mg     2.0     03-04    TPro  6.0  /  Alb  3.5  /  TBili  0.8  /  DBili  x   /  AST  12  /  ALT  5   /  AlkPhos  98  03-04    PT/INR - ( 03 Mar 2019 06:06 )   PT: 13.50 sec;   INR: 1.18 ratio         PTT - ( 03 Mar 2019 06:06 )  PTT:48.4 sec    CAPILLARY BLOOD GLUCOSE          RADIOLOGY & ADDITIONAL TESTS:    Imaging Personally Reviewed:  [ ] YES  [ ] NO    Consultant(s) Notes Reviewed:  [ ] YES  [ ] NO    Care Discussed with Consultants/Other Providers [ ] YES  [ ] NO Patient is seen and examined at the bed side, is Febrile. He remains intubated and on pressor.          REVIEW OF SYSTEMS: Unable to obtain since intubated       Vital Signs Last 24 Hrs  T(C): 38.6 (04 Mar 2019 21:00), Max: 39.6 (04 Mar 2019 02:50)  T(F): 101.5 (04 Mar 2019 21:00), Max: 103.2 (04 Mar 2019 02:50)  HR: 82 (04 Mar 2019 21:00) (24 - 99)  BP: 130/70 (04 Mar 2019 11:10) (92/58 - 130/70)  BP(mean): 91 (04 Mar 2019 11:10) (70 - 92)  RR: 27 (04 Mar 2019 21:00) (22 - 30)  SpO2: 95% (04 Mar 2019 21:00) (91% - 100%)      PHYSICAL EXAM:  GENERAL: Intubated/vented  CHEST/LUNG: Air entry  bilaterally  HEART: s1 and s2 present   ABDOMEN:  Nontender and  Nondistended  EXTREMITIES:  Left upper arm AVF site has drainage from ? sinus track  CNS: Intubated/vented        MEDICATIONS  (STANDING):  calcium carbonate    500 mG (Tums) Chewable 1 Tablet(s) Chew three times a day  cefepime   IVPB 2000 milliGRAM(s) IV Intermittent daily  cefepime   IVPB      chlorhexidine 4% Liquid 1 Application(s) Topical <User Schedule>  heparin  Injectable 5000 Unit(s) SubCutaneous every 12 hours  midazolam Infusion 0.02 mG/kG/Hr (1.992 mL/Hr) IV Continuous <Continuous>  midodrine 10 milliGRAM(s) Oral three times a day  norepinephrine Infusion 0.05 MICROgram(s)/kG/Min (4.669 mL/Hr) IV Continuous <Continuous>  pantoprazole   Suspension 40 milliGRAM(s) Oral daily  sevelamer hydrochloride 1600 milliGRAM(s) Oral three times a day with meals    MEDICATIONS  (PRN):  acetaminophen   Tablet .. 650 milliGRAM(s) Oral every 6 hours PRN Mild Pain (1 - 3)  oxyCODONE    IR 15 milliGRAM(s) Oral four times a day PRN Moderate Pain (4 - 6)      Allergies    Gluten (Unknown)  naproxen (Other)  penicillin (Rash)        LABS:                        13.8   11.19 )-----------( 125      ( 04 Mar 2019 06:19 )             45.6     03-04    140  |  101  |  28<H>  ----------------------------<  109<H>  4.0   |  21  |  6.6<HH>    Ca    10.3<H>      04 Mar 2019 06:19  Phos  4.9     03-04  Mg     2.0     03-04    TPro  6.0  /  Alb  3.5  /  TBili  0.8  /  DBili  x   /  AST  12  /  ALT  5   /  AlkPhos  98  03-04    PT/INR - ( 03 Mar 2019 06:06 )   PT: 13.50 sec;   INR: 1.18 ratio    PTT - ( 03 Mar 2019 06:06 )  PTT:48.4 sec          RADIOLOGY & ADDITIONAL TESTS:    < from: Xray Chest 1 View- PORTABLE-Routine (03.04.19 @ 06:39) >    Improved aeration to left lung with residual left mid to lower lung field   opacity.    Stable right basilar opacity.    Bilateral interstitial opacities    < end of copied text >      < from: Xray Chest 1 View-PORTABLE IMMEDIATE (03.02.19 @ 19:13) >  Status post a median sternotomy and cardiomegaly    Bilateral opacities.    Stent overlies the left axillary region.    No significant change from March 1, 2018.        < end of copied text >

## 2019-03-04 NOTE — PROGRESS NOTE ADULT - ASSESSMENT
Patient is a 63y old  Male who presents with a chief complaint of shortness of breath; developed acute hypercapnic respiratory failure and was intubated on 3/3/2019 with a picture of septic shock.    # Septic shock  - Most likely secondary to infected AVF sinus tract infection and axillary stent?  - Still having fevers  - Patient still requiring pressors  - WBC 11  - Patient was being treated for infected AVF (pseudomonas): will get microbiology report and sensitivities  - No vegetations seen on 2D echo  - Continue Cefepime for now; received one dose of vancomycin  - First blood culture negative to date  - Check repeat blood cultures  - Check RVP  - F/U with surgery for possible exploration: keep NPO   - F/U with ID  - Monitor VS    # Acute hypercapnic respiratory failure  - Remains intubated  - Please check repeat ABG  - CXR still showing bilateral interstitial opacities  - Will probably need HD  - Check RVP    # ESRD  - On HD  - Monitor electrolytes daily  - F/U with nephrology    # DVT prophylaxis: heparin S/Q Patient is a 63y old  Male who presents with a chief complaint of shortness of breath; developed acute hypercapnic respiratory failure and was intubated on 3/3/2019 with a picture of septic shock.    # Septic shock  - Most likely secondary to infected AVF sinus tract infection and axillary stent?  - Still having fevers  - Patient still requiring pressors  - WBC 11  - Patient was being treated for infected AVF (pseudomonas): will get microbiology report and sensitivities  - No vegetations seen on 2D echo  - Continue Cefepime for now; received one dose of vancomycin  - First blood culture negative to date  - Check repeat blood cultures  - Check RVP  - F/U with surgery for possible exploration: keep NPO   - F/U with ID  - Monitor VS    # Acute hypercapnic respiratory failure  - Remains intubated  - Please check repeat ABG  - CXR still showing bilateral interstitial opacities  - Will probably need HD  - Check RVP    # ESRD  - On HD  - Monitor electrolytes daily  - F/U with nephrology    # Hypercalcemia  - Check PTH  - F/U with nephrology    # DVT prophylaxis: heparin S/Q Patient is a 63y old  Male who presents with a chief complaint of shortness of breath; developed acute hypercapnic respiratory failure and was intubated on 3/3/2019 with a picture of septic shock.    # Septic shock  - Most likely secondary to infected AVF sinus tract infection and axillary stent?  - Still having fevers  - Patient still requiring pressors  - WBC 11  - Patient was being treated for infected AVF (pseudomonas): will get microbiology report and sensitivities  - No vegetations seen on 2D echo  - Continue Cefepime for now; received one dose of vancomycin  - First blood culture negative to date  - Check repeat blood cultures  - Check RVP; nasal MRSA  - F/U with surgery for possible exploration: keep NPO for now  - F/U with ID  - Monitor VS; arterial line placed    # Acute hypercapnic respiratory failure  - Remains intubated  - Please check repeat ABG  - CXR still showing bilateral interstitial opacities  - Will probably need HD  - Check RVP    # ESRD  - On HD  - Monitor electrolytes daily  - F/U with nephrology    # Hypercalcemia  - Check PTH  - F/U with nephrology    # DVT prophylaxis: heparin S/Q

## 2019-03-04 NOTE — PROGRESS NOTE ADULT - SUBJECTIVE AND OBJECTIVE BOX
DIAGNOSIS:   HOSPITAL DAY #:    STATUS POST:    POST OPERATIVE DAY #:     Vital Signs Last 24 Hrs  T(C): 38.6 (04 Mar 2019 21:00), Max: 39.6 (04 Mar 2019 02:50)  T(F): 101.5 (04 Mar 2019 21:00), Max: 103.2 (04 Mar 2019 02:50)  HR: 82 (04 Mar 2019 21:00) (24 - 99)  BP: 130/70 (04 Mar 2019 11:10) (92/58 - 130/70)  BP(mean): 91 (04 Mar 2019 11:10) (70 - 92)  RR: 27 (04 Mar 2019 21:00) (22 - 30)  SpO2: 95% (04 Mar 2019 21:00) (91% - 100%)    SUBJECTIVE: Pt seen    Pain: YES  [ ]   NO [ ]   Nausea: [ ] YES [ ] NO           Vomiting: [ ] YES [ ] NO  Flatus: [ ] YES [ ] NO             Bowel Movement: [ ] YES [ ] NO     Void: [ ]YES [ ]No      REGIS DRAINAGE: SIGNIFICANT [ ]   NOT SIGNIFICANT [ ]   NOT APPLICABLE [ ]  YES [ ] NO    General Appearance: Appears well, NAD  Neck: Supple intubeted  Chest: Equal expansion bilaterally, equal breath sounds  CV: Pulse regular presently  Abdomen: Soft [x ] YES [ ]NO  DISTENDED [ ] YES [x ] NO TENDERNESS [ ]YES [x ]NO  INCISIONS: HEALING WELL [ ] YES  [ ] NO ERYTHEMA [ ] YES [ ] NO DRAINAGE [ ] YES  [ ] NO  Extremities: Grossly symmetric, CALF TENDERNESS [ ] YES  [x ] NO  LT arm incision  healing no drainage no gross infection     LABS:                        13.8   11.19 )-----------( 125      ( 04 Mar 2019 06:19 )             45.6     03-04    140  |  101  |  28<H>  ----------------------------<  109<H>  4.0   |  21  |  6.6<HH>    Ca    10.3<H>      04 Mar 2019 06:19  Phos  4.9     03-04  Mg     2.0     03-04    TPro  6.0  /  Alb  3.5  /  TBili  0.8  /  DBili  x   /  AST  12  /  ALT  5   /  AlkPhos  98  03-04    PT/INR - ( 03 Mar 2019 06:06 )   PT: 13.50 sec;   INR: 1.18 ratio         PTT - ( 03 Mar 2019 06:06 )  PTT:48.4 sec        ASSESSMENT:     GOOD POST OP COURSE [ ]  YES  [ ] NO  CONDITION IMPROVING  []  YES  [ ]  NO          PLAN: sonogram of LT arm discussed case with DR Carrion    CONTINUE PRESENT MANAGEMENT  [ ] YES  [ ] NO

## 2019-03-04 NOTE — PROGRESS NOTE ADULT - ASSESSMENT
63M    ESRD on HD  H/O aortic valve replacement  HX AVF infection with pseudomonas (1/2/19- s/p vanc/gent but (I) gent, 4/2018, 3/2018), R fluor    Admitted with SOB after missing HD  SIRS on admission, sepsis ruled out T35.9 P96 WBC 6  systolic Hypotension  Completed recent vanc/gent course for pseudomonal infection (pseudo was I gent)  CXR Bilateral opacities.    - f/u bcx  - empiric cefepime 2g q24h, and vanc 1g x1  - as had reported sore throat, rhinorrhea would send RVP 63M    ESRD on HD  H/O aortic valve replacement  HX AVF infection with pseudomonas (1/2/19- s/p vanc/gent but (I) gent, 4/2018, 3/2018), R fluor    # Septic shock- on pressor  # Infected AVF site- draining from ? sinus tract  # Respiratory  failure - s/p intubation - CXR Bilateral opacities.    would recommend:  1. Obtain Imaging of Left upper extremities  to rule out Abscess/fluid collection  2. Follow up RVP and MRSA PCR  3. Monitor Temp. and c/w supportive care  4. Obtain Vancomycin level and Re-dose if level is less than 15  5. Management of Vent as per ICU protocol   6. Aspiration precaution     d/w CCU team

## 2019-03-04 NOTE — PROGRESS NOTE ADULT - SUBJECTIVE AND OBJECTIVE BOX
Patient is a 63y old  Male who presents with a chief complaint of weakness, hypotension (04 Mar 2019 09:38)        SUBJECTIVE: S/p intubation      REVIEW OF SYSTEMS:  See HPI    PHYSICAL EXAM  Vital Signs Last 24 Hrs  T(C): 36.8 (04 Mar 2019 11:10), Max: 39.6 (04 Mar 2019 02:50)  T(F): 98.2 (04 Mar 2019 11:10), Max: 103.2 (04 Mar 2019 02:50)  HR: 99 (04 Mar 2019 11:10) (24 - 102)  BP: 130/70 (04 Mar 2019 11:10) (92/58 - 130/70)  BP(mean): 91 (04 Mar 2019 11:10) (69 - 92)  RR: 30 (04 Mar 2019 11:10) (20 - 32)  SpO2: 96% (04 Mar 2019 09:38) (90% - 100%)    General: Intubated and sedated  HEENT: MARTINE               Lymphatic system: No Cervical LN    Respiratory: [place] BS decreased  Cardiovascular: Regular  Gastrointestinal: Soft. + BS  Musculoskeletal: No clubbing. No edema  Skin: Warm.  Intact  Neurological: Sedated      03-03-19 @ 07:01  -  03-04-19 @ 07:00  --------------------------------------------------------  IN:    Enteral Tube Flush: 200 mL    IV PiggyBack: 300 mL    midazolam Infusion: 198.4 mL    Nepro: 175 mL    norepinephrine Infusion: 891 mL  Total IN: 1764.4 mL    OUT:  Total OUT: 0 mL    Total NET: 1764.4 mL      03-04-19 @ 07:01  -  03-04-19 @ 11:28  --------------------------------------------------------  IN:    Enteral Tube Flush: 135 mL    midazolam Infusion: 43.2 mL    norepinephrine Infusion: 180 mL  Total IN: 358.2 mL    OUT:  Total OUT: 0 mL    Total NET: 358.2 mL          LABS:                          13.8   11.19 )-----------( 125      ( 04 Mar 2019 06:19 )             45.6                                               03-04    140  |  101  |  28<H>  ----------------------------<  109<H>  4.0   |  21  |  6.6<HH>    Ca    10.3<H>      04 Mar 2019 06:19  Phos  4.9     03-04  Mg     2.0     03-04    TPro  6.0  /  Alb  3.5  /  TBili  0.8  /  DBili  x   /  AST  12  /  ALT  5   /  AlkPhos  98  03-04      PT/INR - ( 03 Mar 2019 06:06 )   PT: 13.50 sec;   INR: 1.18 ratio         PTT - ( 03 Mar 2019 06:06 )  PTT:48.4 sec                                           CARDIAC MARKERS ( 02 Mar 2019 15:48 )  x     / 0.09 ng/mL / x     / x     / x      CARDIAC MARKERS ( 02 Mar 2019 13:07 )  x     / 0.10 ng/mL / 19 U/L / x     / 3.5 ng/mL                                            LIVER FUNCTIONS - ( 04 Mar 2019 06:19 )  Alb: 3.5 g/dL / Pro: 6.0 g/dL / ALK PHOS: 98 U/L / ALT: 5 U/L / AST: 12 U/L / GGT: x                                                  Culture - Blood (collected 02 Mar 2019 08:46)  Source: .Blood None  Preliminary Report (03 Mar 2019 20:01):    No growth to date.                                                ABG - ( 03 Mar 2019 15:54 )  pH, Arterial: 7.15  pH, Blood: x     /  pCO2: 76    /  pO2: 86    / HCO3: 26    / Base Excess: -4.6  /  SaO2: 96        Vent: 24/450/40/5      MEDICATIONS  (STANDING):  calcium carbonate    500 mG (Tums) Chewable 1 Tablet(s) Chew three times a day  cefepime   IVPB 2000 milliGRAM(s) IV Intermittent daily  cefepime   IVPB      chlorhexidine 4% Liquid 1 Application(s) Topical <User Schedule>  heparin  Injectable 5000 Unit(s) SubCutaneous every 12 hours  midazolam Infusion 0.02 mG/kG/Hr (1.992 mL/Hr) IV Continuous <Continuous>  midodrine 10 milliGRAM(s) Oral three times a day  norepinephrine Infusion 0.05 MICROgram(s)/kG/Min (4.669 mL/Hr) IV Continuous <Continuous>  pantoprazole   Suspension 40 milliGRAM(s) Oral daily  sevelamer hydrochloride 1600 milliGRAM(s) Oral three times a day with meals    MEDICATIONS  (PRN):  acetaminophen   Tablet .. 650 milliGRAM(s) Oral every 6 hours PRN Mild Pain (1 - 3)  oxyCODONE    IR 15 milliGRAM(s) Oral four times a day PRN Moderate Pain (4 - 6)

## 2019-03-04 NOTE — PROGRESS NOTE ADULT - SUBJECTIVE AND OBJECTIVE BOX
ANNIE ARGUETA    Patient is a 63y old  Male who presents with a chief complaint of shortness of breath      Interval History/Overnight events:    T(C): 26.7 (03-04-19 @ 07:05), Max: 39.6 (03-04-19 @ 02:50)  HR: 24 (03-04-19 @ 07:05)  BP: 100/59 (03-04-19 @ 07:05)  RR: 24 (03-04-19 @ 07:05)  SpO2: 100% (03-04-19 @ 07:05)    PHYSICAL EXAM:    GENERAL: Patient intubated, sedated  CHEST/LUNG: Bilateral rhonchi heard  HEART: Regular rate and rhythm; systolic murmur heard on auscultation  ABDOMEN: Soft, Nontender, Nondistended  EXTREMITIES: Mild LE bilateral edema    LABS:                          14.3   9.72  )-----------( 136      ( 03 Mar 2019 06:06 )             48.9     03-03    140  |  99  |  16  ----------------------------<  102<H>  4.3   |  26  |  5.7<HH>    Ca    10.1      03 Mar 2019 06:06  Phos  7.6     03-03  Mg     2.2     03-03    TPro  6.8  /  Alb  3.9  /  TBili  0.5  /  DBili  x   /  AST  12  /  ALT  6   /  AlkPhos  112  03-03    PT/INR - ( 03 Mar 2019 06:06 )   PT: 13.50 sec;   INR: 1.18 ratio         PTT - ( 03 Mar 2019 06:06 )  PTT:48.4 sec  CARDIAC MARKERS ( 02 Mar 2019 15:48 )  x     / 0.09 ng/mL / x     / x     / x      CARDIAC MARKERS ( 02 Mar 2019 13:07 )  x     / 0.10 ng/mL / 19 U/L / x     / 3.5 ng/mL  CARDIAC MARKERS ( 02 Mar 2019 08:46 )  x     / 0.08 ng/mL / 23 U/L / x     / 3.8 ng/mL    Culture - Blood (collected 02 Mar 2019 08:46)  Source: .Blood None  Preliminary Report (03 Mar 2019 20:01):    No growth to date.    LIVER FUNCTIONS - ( 03 Mar 2019 06:06 )  Alb: 3.9 g/dL / Pro: 6.8 g/dL / ALK PHOS: 112 U/L / ALT: 6 U/L / AST: 12 U/L / GGT: x             MEDICATIONS:    MEDICATIONS  (STANDING):  calcium carbonate    500 mG (Tums) Chewable 1 Tablet(s) Chew three times a day  cefepime   IVPB 2000 milliGRAM(s) IV Intermittent daily  cefepime   IVPB      chlorhexidine 4% Liquid 1 Application(s) Topical <User Schedule>  heparin  Injectable 5000 Unit(s) SubCutaneous every 12 hours  midazolam Infusion 0.02 mG/kG/Hr (1.992 mL/Hr) IV Continuous <Continuous>  midodrine 10 milliGRAM(s) Oral three times a day  norepinephrine Infusion 0.05 MICROgram(s)/kG/Min (4.669 mL/Hr) IV Continuous <Continuous>  pantoprazole    Tablet 40 milliGRAM(s) Oral before breakfast      MEDICATIONS  (PRN):  acetaminophen   Tablet .. 650 milliGRAM(s) Oral every 6 hours PRN Mild Pain (1 - 3)  oxyCODONE    IR 15 milliGRAM(s) Oral four times a day PRN Moderate Pain (4 - 6) ANNIE ARGUETA    Patient is a 63y old  Male who presents with a chief complaint of shortness of breath      Interval History/Overnight events: Patient still having fevers with Tmax of 103.2 degrees F    T(C): 26.7 (03-04-19 @ 07:05), Max: 39.6 (03-04-19 @ 02:50)  HR: 24 (03-04-19 @ 07:05)  BP: 100/59 (03-04-19 @ 07:05)  RR: 24 (03-04-19 @ 07:05)  SpO2: 100% (03-04-19 @ 07:05)    PHYSICAL EXAM:    GENERAL: Patient intubated, sedated  CHEST/LUNG: Bilateral rhonchi heard  HEART: Regular rate and rhythm; systolic murmur heard on auscultation  ABDOMEN: Soft, Nontender, Nondistended  EXTREMITIES: Mild LE bilateral edema    LABS:                          14.3   9.72  )-----------( 136      ( 03 Mar 2019 06:06 )             48.9     03-03    140  |  99  |  16  ----------------------------<  102<H>  4.3   |  26  |  5.7<HH>    Ca    10.1      03 Mar 2019 06:06  Phos  7.6     03-03  Mg     2.2     03-03    TPro  6.8  /  Alb  3.9  /  TBili  0.5  /  DBili  x   /  AST  12  /  ALT  6   /  AlkPhos  112  03-03    PT/INR - ( 03 Mar 2019 06:06 )   PT: 13.50 sec;   INR: 1.18 ratio         PTT - ( 03 Mar 2019 06:06 )  PTT:48.4 sec  CARDIAC MARKERS ( 02 Mar 2019 15:48 )  x     / 0.09 ng/mL / x     / x     / x      CARDIAC MARKERS ( 02 Mar 2019 13:07 )  x     / 0.10 ng/mL / 19 U/L / x     / 3.5 ng/mL  CARDIAC MARKERS ( 02 Mar 2019 08:46 )  x     / 0.08 ng/mL / 23 U/L / x     / 3.8 ng/mL    Culture - Blood (collected 02 Mar 2019 08:46)  Source: .Blood None  Preliminary Report (03 Mar 2019 20:01):    No growth to date.    LIVER FUNCTIONS - ( 03 Mar 2019 06:06 )  Alb: 3.9 g/dL / Pro: 6.8 g/dL / ALK PHOS: 112 U/L / ALT: 6 U/L / AST: 12 U/L / GGT: x             MEDICATIONS:    MEDICATIONS  (STANDING):  calcium carbonate    500 mG (Tums) Chewable 1 Tablet(s) Chew three times a day  cefepime   IVPB 2000 milliGRAM(s) IV Intermittent daily  cefepime   IVPB      chlorhexidine 4% Liquid 1 Application(s) Topical <User Schedule>  heparin  Injectable 5000 Unit(s) SubCutaneous every 12 hours  midazolam Infusion 0.02 mG/kG/Hr (1.992 mL/Hr) IV Continuous <Continuous>  midodrine 10 milliGRAM(s) Oral three times a day  norepinephrine Infusion 0.05 MICROgram(s)/kG/Min (4.669 mL/Hr) IV Continuous <Continuous>  pantoprazole    Tablet 40 milliGRAM(s) Oral before breakfast      MEDICATIONS  (PRN):  acetaminophen   Tablet .. 650 milliGRAM(s) Oral every 6 hours PRN Mild Pain (1 - 3)  oxyCODONE    IR 15 milliGRAM(s) Oral four times a day PRN Moderate Pain (4 - 6) ANNIE ARGUETA    Patient is a 63y old  Male who presents with a chief complaint of shortness of breath      Interval History/Overnight events: Patient still having fevers with Tmax of 103.2 degrees F    T(C): 26.7 (03-04-19 @ 07:05), Max: 39.6 (03-04-19 @ 02:50)  HR: 24 (03-04-19 @ 07:05)  BP: 100/59 (03-04-19 @ 07:05)  RR: 24 (03-04-19 @ 07:05)  SpO2: 100% (03-04-19 @ 07:05)    PHYSICAL EXAM:    GENERAL: Patient intubated, sedated  CHEST/LUNG: Bilateral rhonchi heard  HEART: Regular rate and rhythm; systolic murmur heard on auscultation  ABDOMEN: Soft, Nontender, Nondistended  EXTREMITIES: Mild LE bilateral edema; erythema around previous AVF with discharge    LABS:                          14.3   9.72  )-----------( 136      ( 03 Mar 2019 06:06 )             48.9     03-03    140  |  99  |  16  ----------------------------<  102<H>  4.3   |  26  |  5.7<HH>    Ca    10.1      03 Mar 2019 06:06  Phos  7.6     03-03  Mg     2.2     03-03    TPro  6.8  /  Alb  3.9  /  TBili  0.5  /  DBili  x   /  AST  12  /  ALT  6   /  AlkPhos  112  03-03    PT/INR - ( 03 Mar 2019 06:06 )   PT: 13.50 sec;   INR: 1.18 ratio         PTT - ( 03 Mar 2019 06:06 )  PTT:48.4 sec  CARDIAC MARKERS ( 02 Mar 2019 15:48 )  x     / 0.09 ng/mL / x     / x     / x      CARDIAC MARKERS ( 02 Mar 2019 13:07 )  x     / 0.10 ng/mL / 19 U/L / x     / 3.5 ng/mL  CARDIAC MARKERS ( 02 Mar 2019 08:46 )  x     / 0.08 ng/mL / 23 U/L / x     / 3.8 ng/mL    Culture - Blood (collected 02 Mar 2019 08:46)  Source: .Blood None  Preliminary Report (03 Mar 2019 20:01):    No growth to date.    LIVER FUNCTIONS - ( 03 Mar 2019 06:06 )  Alb: 3.9 g/dL / Pro: 6.8 g/dL / ALK PHOS: 112 U/L / ALT: 6 U/L / AST: 12 U/L / GGT: x             MEDICATIONS:    MEDICATIONS  (STANDING):  calcium carbonate    500 mG (Tums) Chewable 1 Tablet(s) Chew three times a day  cefepime   IVPB 2000 milliGRAM(s) IV Intermittent daily  cefepime   IVPB      chlorhexidine 4% Liquid 1 Application(s) Topical <User Schedule>  heparin  Injectable 5000 Unit(s) SubCutaneous every 12 hours  midazolam Infusion 0.02 mG/kG/Hr (1.992 mL/Hr) IV Continuous <Continuous>  midodrine 10 milliGRAM(s) Oral three times a day  norepinephrine Infusion 0.05 MICROgram(s)/kG/Min (4.669 mL/Hr) IV Continuous <Continuous>  pantoprazole    Tablet 40 milliGRAM(s) Oral before breakfast      MEDICATIONS  (PRN):  acetaminophen   Tablet .. 650 milliGRAM(s) Oral every 6 hours PRN Mild Pain (1 - 3)  oxyCODONE    IR 15 milliGRAM(s) Oral four times a day PRN Moderate Pain (4 - 6)

## 2019-03-04 NOTE — CONSULT NOTE ADULT - SUBJECTIVE AND OBJECTIVE BOX
Surgery Consultation Note  =====================================================  HPI: 63y Male  HPI:  63 year old male presents to the ER due to worsening shortness of breath. Notes that he was too sick to go for his scheduled dialysis today. States he does not make urine. Patient was hypotensive in the ER but improved with IV fluids. Denies fevers but reports he had been IV antibiotics for 1 year previously for infection involving left upper extremity Adds that he gets side effects from penicillin derivatives. Patient seen by for LUE edema and concerns for fistula infection. Patient denies any pain, pus and drainage from the site. Denies any fever/chills. Recently treated for post op fistula infection. Currently undergoes HD via Right Groin Dago Cath.      PAST MEDICAL & SURGICAL HISTORY:  Bleeding ulcer  Dialysis patient  CKD (chronic kidney disease)  H/O aortic valve replacement    Home Meds: Home Medications:  oxyCODONE:  (01 Mar 2019 19:11)    Allergies: Allergies    Gluten (Unknown)  naproxen (Other)  penicillin (Rash)    Intolerances      Soc:   Advanced Directives: Presumed Full Code     ROS:    REVIEW OF SYSTEMS    [ ] A ten-point review of systems was otherwise negative except as noted.  [ ] Due to altered mental status/intubation, subjective information were not able to be obtained from the patient. History was obtained, to the extent possible, from review of the chart and collateral sources of information.      CURRENT MEDICATIONS:   --------------------------------------------------------------------------------------  Neurologic Medications  acetaminophen   Tablet .. 650 milliGRAM(s) Oral every 6 hours PRN Mild Pain (1 - 3)  midazolam Infusion 0.02 mG/kG/Hr IV Continuous <Continuous>  oxyCODONE    IR 15 milliGRAM(s) Oral four times a day PRN Moderate Pain (4 - 6)    Respiratory Medications    Cardiovascular Medications  midodrine 10 milliGRAM(s) Oral three times a day  norepinephrine Infusion 0.05 MICROgram(s)/kG/Min IV Continuous <Continuous>    Gastrointestinal Medications  calcium carbonate    500 mG (Tums) Chewable 1 Tablet(s) Chew three times a day  pantoprazole   Suspension 40 milliGRAM(s) Oral daily    Genitourinary Medications    Hematologic/Oncologic Medications  heparin  Injectable 5000 Unit(s) SubCutaneous every 12 hours    Antimicrobial/Immunologic Medications  cefepime   IVPB 2000 milliGRAM(s) IV Intermittent daily  cefepime   IVPB        Endocrine/Metabolic Medications    Topical/Other Medications  chlorhexidine 4% Liquid 1 Application(s) Topical <User Schedule>  sevelamer hydrochloride 1600 milliGRAM(s) Oral three times a day with meals    --------------------------------------------------------------------------------------    VITAL SIGNS, INS/OUTS (last 24 hours):  --------------------------------------------------------------------------------------  ICU Vital Signs Last 24 Hrs  T(C): 38.6 (04 Mar 2019 21:00), Max: 39.6 (04 Mar 2019 02:50)  T(F): 101.5 (04 Mar 2019 21:00), Max: 103.2 (04 Mar 2019 02:50)  HR: 82 (04 Mar 2019 21:00) (24 - 99)  BP: 130/70 (04 Mar 2019 11:10) (92/58 - 130/70)  BP(mean): 91 (04 Mar 2019 11:10) (70 - 92)  ABP: 103/63 (04 Mar 2019 21:00) (97/54 - 111/80)  ABP(mean): 76 (04 Mar 2019 21:00) (66 - 90)  RR: 27 (04 Mar 2019 21:00) (22 - 30)  SpO2: 95% (04 Mar 2019 21:00) (91% - 100%)    I&O's Summary    03 Mar 2019 07:01  -  04 Mar 2019 07:00  --------------------------------------------------------  IN: 1764.4 mL / OUT: 0 mL / NET: 1764.4 mL    04 Mar 2019 07:01  -  04 Mar 2019 22:56  --------------------------------------------------------  IN: 922.4 mL / OUT: 0 mL / NET: 922.4 mL      --------------------------------------------------------------------------------------    EXAM:  General/Neuro    Exam: Normal, NAD, alert, oriented x 3, no focal deficits. PERRLA     Respiratory  Exam: Lungs clear to auscultation, Normal expansion/effort.   [] Tracheostomy   [] Intubated  Mechanical Ventilation: Mode: Auto Mode: PRVC/ Volume Support  RR (machine): 24  TV (machine): 450  FiO2: 40  PEEP: 8  MAP: 15  PIP: 30      Cardiovascular  Exam: S1, S2.  Regular rate and rhythm.  Peripheral edema    Cardiac Rhythm: Normal Sinus Rhythm  ECHO:     GI  Exam: Abdomen soft, Non-tender, Non-distended.   Current Diet:  NPO      Tubes/Lines/Drains    [x] Peripheral IV  [] Central Venous Line     	[] R	[] L	[] IJ	[] Fem	[] SC        Type:	    Date Placed:   [] Arterial Line		[] R	[] L	[] Fem	[] Rad	[] Ax	Date Placed:   [] PICC:         	[] Midline		[] Mediport           [] Urinary Catheter		Date Placed:     Extremities  Exam: LUE edema noted near fistula site. Nontender, nonindurated. No drainage noted. palpable thrill felt. Radial and ulnar pulses 2+.      Derm:  Exam: Good skin turgor, no skin breakdown.      :   Exam: Holt catheter in place.     LABS  --------------------------------------------------------------------------------------  Labs:  CAPILLARY BLOOD GLUCOSE                              13.8   11.19 )-----------( 125      ( 04 Mar 2019 06:19 )             45.6       Auto Neutrophil %: 75.0 % (03-04-19 @ 06:19)    03-04    140  |  101  |  28<H>  ----------------------------<  109<H>  4.0   |  21  |  6.6<HH>      Calcium, Total Serum: 10.3 mg/dL (03-04-19 @ 06:19)      LFTs:             6.0  | 0.8  | 12       ------------------[98      ( 04 Mar 2019 06:19 )  3.5  | x    | 5           Lipase:x      Amylase:x         Blood Gas Arterial, Lactate: 1.4 mmoL/L (03-04-19 @ 16:07)  Blood Gas Arterial, Lactate: 1.5 mmoL/L (03-04-19 @ 11:47)  Blood Gas Arterial, Lactate: 1.0 mmoL/L (03-03-19 @ 15:54)  Blood Gas Arterial, Lactate: 1.0 mmoL/L (03-03-19 @ 11:15)    ABG - ( 04 Mar 2019 16:07 )  pH: 7.33  /  pCO2: 44    /  pO2: 71    / HCO3: 23    / Base Excess: -2.8  /  SaO2: 94              ABG - ( 04 Mar 2019 11:47 )  pH: 7.32  /  pCO2: 48    /  pO2: 58    / HCO3: 25    / Base Excess: -2.0  /  SaO2: 89              ABG - ( 03 Mar 2019 15:54 )  pH: 7.15  /  pCO2: 76    /  pO2: 86    / HCO3: 26    / Base Excess: -4.6  /  SaO2: 96                Coags:     13.50  ----< 1.18    ( 03 Mar 2019 06:06 )     48.4            Serum Pro-Brain Natriuretic Peptide: >05014 pg/mL (03-01-19 @ 18:10)          Culture - Blood (collected 03 Mar 2019 06:06)  Source: .Blood None  Preliminary Report (04 Mar 2019 18:01):    No growth to date.    Culture - Blood (collected 02 Mar 2019 08:46)  Source: .Blood None  Preliminary Report (03 Mar 2019 20:01):    No growth to date.        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     PLAN:     - LUE Sonogram  - Surgery will follow

## 2019-03-05 ENCOUNTER — RESULT REVIEW (OUTPATIENT)
Age: 64
End: 2019-03-05

## 2019-03-05 DIAGNOSIS — A41.9 SEPSIS, UNSPECIFIED ORGANISM: ICD-10-CM

## 2019-03-05 LAB
ALBUMIN SERPL ELPH-MCNC: 3.3 G/DL — LOW (ref 3.5–5.2)
ALP SERPL-CCNC: 94 U/L — SIGNIFICANT CHANGE UP (ref 30–115)
ALT FLD-CCNC: 5 U/L — SIGNIFICANT CHANGE UP (ref 0–41)
ANION GAP SERPL CALC-SCNC: 15 MMOL/L — HIGH (ref 7–14)
AST SERPL-CCNC: 14 U/L — SIGNIFICANT CHANGE UP (ref 0–41)
B PERT IGG+IGM PNL SER: ABNORMAL
BASOPHILS # BLD AUTO: 0.08 K/UL — SIGNIFICANT CHANGE UP (ref 0–0.2)
BASOPHILS NFR BLD AUTO: 0.7 % — SIGNIFICANT CHANGE UP (ref 0–1)
BILIRUB SERPL-MCNC: 0.8 MG/DL — SIGNIFICANT CHANGE UP (ref 0.2–1.2)
BUN SERPL-MCNC: 40 MG/DL — HIGH (ref 10–20)
CALCIUM SERPL-MCNC: 10.4 MG/DL — HIGH (ref 8.5–10.1)
CALCIUM SERPL-MCNC: 10.7 MG/DL — HIGH (ref 8.4–10.5)
CHLORIDE SERPL-SCNC: 101 MMOL/L — SIGNIFICANT CHANGE UP (ref 98–110)
CO2 SERPL-SCNC: 23 MMOL/L — SIGNIFICANT CHANGE UP (ref 17–32)
COLOR FLD: YELLOW — SIGNIFICANT CHANGE UP
CORTIS AM PEAK SERPL-MCNC: 13.7 UG/DL — SIGNIFICANT CHANGE UP (ref 6–18.4)
CREAT SERPL-MCNC: 8 MG/DL — CRITICAL HIGH (ref 0.7–1.5)
EOSINOPHIL # BLD AUTO: 0.04 K/UL — SIGNIFICANT CHANGE UP (ref 0–0.7)
EOSINOPHIL NFR BLD AUTO: 0.3 % — SIGNIFICANT CHANGE UP (ref 0–8)
FLUID INTAKE SUBSTANCE CLASS: SIGNIFICANT CHANGE UP
FLUID SEGMENTED GRANULOCYTES: 33 % — SIGNIFICANT CHANGE UP
GAS PNL BLDA: SIGNIFICANT CHANGE UP
GAS PNL BLDA: SIGNIFICANT CHANGE UP
GLUCOSE SERPL-MCNC: 92 MG/DL — SIGNIFICANT CHANGE UP (ref 70–99)
HCT VFR BLD CALC: 44.9 % — SIGNIFICANT CHANGE UP (ref 42–52)
HGB BLD-MCNC: 13.9 G/DL — LOW (ref 14–18)
IMM GRANULOCYTES NFR BLD AUTO: 0.2 % — SIGNIFICANT CHANGE UP (ref 0.1–0.3)
LYMPHOCYTES # BLD AUTO: 1.19 K/UL — LOW (ref 1.2–3.4)
LYMPHOCYTES # BLD AUTO: 9.8 % — LOW (ref 20.5–51.1)
LYMPHOCYTES # FLD: 31 % — SIGNIFICANT CHANGE UP
MAGNESIUM SERPL-MCNC: 2.1 MG/DL — SIGNIFICANT CHANGE UP (ref 1.8–2.4)
MCHC RBC-ENTMCNC: 31 G/DL — LOW (ref 32–37)
MCHC RBC-ENTMCNC: 31 PG — SIGNIFICANT CHANGE UP (ref 27–31)
MCV RBC AUTO: 100.2 FL — HIGH (ref 80–94)
MONOCYTES # BLD AUTO: 1.58 K/UL — HIGH (ref 0.1–0.6)
MONOCYTES NFR BLD AUTO: 13.1 % — HIGH (ref 1.7–9.3)
MONOS+MACROS # FLD: 36 % — SIGNIFICANT CHANGE UP
NEUTROPHILS # BLD AUTO: 9.17 K/UL — HIGH (ref 1.4–6.5)
NEUTROPHILS NFR BLD AUTO: 75.9 % — HIGH (ref 42.2–75.2)
NRBC # BLD: 0 /100 WBCS — SIGNIFICANT CHANGE UP (ref 0–0)
PHOSPHATE SERPL-MCNC: 5.9 MG/DL — HIGH (ref 2.1–4.9)
PLATELET # BLD AUTO: 114 K/UL — LOW (ref 130–400)
POTASSIUM SERPL-MCNC: 4.4 MMOL/L — SIGNIFICANT CHANGE UP (ref 3.5–5)
POTASSIUM SERPL-SCNC: 4.4 MMOL/L — SIGNIFICANT CHANGE UP (ref 3.5–5)
PROT SERPL-MCNC: 5.9 G/DL — LOW (ref 6–8)
PTH-INTACT FLD-MCNC: 301 PG/ML — HIGH (ref 15–65)
RAPID RVP RESULT: SIGNIFICANT CHANGE UP
RBC # BLD: 4.48 M/UL — LOW (ref 4.7–6.1)
RBC # FLD: 16.6 % — HIGH (ref 11.5–14.5)
RCV VOL RI: 5000 /UL — HIGH (ref 0–5)
SODIUM SERPL-SCNC: 139 MMOL/L — SIGNIFICANT CHANGE UP (ref 135–146)
TOTAL NUCLEATED CELL COUNT, BODY FLUID: 231 /UL — HIGH (ref 0–5)
TSH SERPL-MCNC: 0.91 UIU/ML — SIGNIFICANT CHANGE UP (ref 0.27–4.2)
TUBE TYPE: SIGNIFICANT CHANGE UP
TYPE + AB SCN PNL BLD: SIGNIFICANT CHANGE UP
WBC # BLD: 12.09 K/UL — HIGH (ref 4.8–10.8)
WBC # FLD AUTO: 12.09 K/UL — HIGH (ref 4.8–10.8)

## 2019-03-05 RX ORDER — VANCOMYCIN HCL 1 G
VIAL (EA) INTRAVENOUS
Qty: 0 | Refills: 0 | Status: DISCONTINUED | OUTPATIENT
Start: 2019-03-05 | End: 2019-03-07

## 2019-03-05 RX ORDER — CHLORHEXIDINE GLUCONATE 213 G/1000ML
15 SOLUTION TOPICAL
Qty: 0 | Refills: 0 | Status: DISCONTINUED | OUTPATIENT
Start: 2019-03-05 | End: 2019-03-09

## 2019-03-05 RX ORDER — VANCOMYCIN HCL 1 G
1000 VIAL (EA) INTRAVENOUS ONCE
Qty: 0 | Refills: 0 | Status: COMPLETED | OUTPATIENT
Start: 2019-03-05 | End: 2019-03-05

## 2019-03-05 RX ORDER — CEFEPIME 1 G/1
1000 INJECTION, POWDER, FOR SOLUTION INTRAMUSCULAR; INTRAVENOUS EVERY 24 HOURS
Qty: 0 | Refills: 0 | Status: DISCONTINUED | OUTPATIENT
Start: 2019-03-05 | End: 2019-03-06

## 2019-03-05 RX ADMIN — Medication 650 MILLIGRAM(S): at 15:01

## 2019-03-05 RX ADMIN — MIDODRINE HYDROCHLORIDE 10 MILLIGRAM(S): 2.5 TABLET ORAL at 05:34

## 2019-03-05 RX ADMIN — Medication 650 MILLIGRAM(S): at 23:55

## 2019-03-05 RX ADMIN — Medication 250 MILLIGRAM(S): at 13:27

## 2019-03-05 RX ADMIN — Medication 650 MILLIGRAM(S): at 21:31

## 2019-03-05 RX ADMIN — PANTOPRAZOLE SODIUM 40 MILLIGRAM(S): 20 TABLET, DELAYED RELEASE ORAL at 12:47

## 2019-03-05 RX ADMIN — Medication 1 TABLET(S): at 13:27

## 2019-03-05 RX ADMIN — Medication 650 MILLIGRAM(S): at 05:33

## 2019-03-05 RX ADMIN — Medication 650 MILLIGRAM(S): at 08:28

## 2019-03-05 RX ADMIN — MIDODRINE HYDROCHLORIDE 10 MILLIGRAM(S): 2.5 TABLET ORAL at 21:31

## 2019-03-05 RX ADMIN — MIDODRINE HYDROCHLORIDE 10 MILLIGRAM(S): 2.5 TABLET ORAL at 13:27

## 2019-03-05 RX ADMIN — SEVELAMER CARBONATE 1600 MILLIGRAM(S): 2400 POWDER, FOR SUSPENSION ORAL at 18:03

## 2019-03-05 RX ADMIN — HEPARIN SODIUM 5000 UNIT(S): 5000 INJECTION INTRAVENOUS; SUBCUTANEOUS at 18:03

## 2019-03-05 RX ADMIN — CHLORHEXIDINE GLUCONATE 1 APPLICATION(S): 213 SOLUTION TOPICAL at 00:30

## 2019-03-05 RX ADMIN — Medication 1 TABLET(S): at 21:31

## 2019-03-05 RX ADMIN — Medication 650 MILLIGRAM(S): at 16:44

## 2019-03-05 RX ADMIN — HEPARIN SODIUM 5000 UNIT(S): 5000 INJECTION INTRAVENOUS; SUBCUTANEOUS at 05:32

## 2019-03-05 RX ADMIN — MIDAZOLAM HYDROCHLORIDE 1.99 MG/KG/HR: 1 INJECTION, SOLUTION INTRAMUSCULAR; INTRAVENOUS at 05:48

## 2019-03-05 RX ADMIN — Medication 1 TABLET(S): at 05:33

## 2019-03-05 RX ADMIN — CHLORHEXIDINE GLUCONATE 15 MILLILITER(S): 213 SOLUTION TOPICAL at 18:03

## 2019-03-05 RX ADMIN — CEFEPIME 100 MILLIGRAM(S): 1 INJECTION, POWDER, FOR SOLUTION INTRAMUSCULAR; INTRAVENOUS at 12:47

## 2019-03-05 RX ADMIN — Medication 4.67 MICROGRAM(S)/KG/MIN: at 05:49

## 2019-03-05 RX ADMIN — SEVELAMER CARBONATE 1600 MILLIGRAM(S): 2400 POWDER, FOR SUSPENSION ORAL at 12:48

## 2019-03-05 RX ADMIN — SEVELAMER CARBONATE 1600 MILLIGRAM(S): 2400 POWDER, FOR SUSPENSION ORAL at 08:06

## 2019-03-05 NOTE — PROVIDER CONTACT NOTE (CHANGE IN STATUS NOTIFICATION) - ACTION/TREATMENT ORDERED:
Dr Gomez exchanged ETT with 7.5 ETT with a bouche without incident noted old ETT balloon malfunction Stat CXR confirfirmed placement

## 2019-03-05 NOTE — CONSULT NOTE ADULT - ASSESSMENT
IMP:  - acute hypercapnic resp failure  - sepsis + pressors, as yet without clear source  - ESRD on HD  - protein deficit - note low BUN r/t creat - pt clinically not malnourished, but he is "behind" and at high risk  - hypercalcemia r/t hyperPTH    PSE est kcal use 2359 k/d, protein needs > 125 gm/d and possibly up to 150 gm/d  Suggest cont Nepro but at 45 ml/h and add Beneprotein 3 packets (whey protein specifically) q 12h.

## 2019-03-05 NOTE — CONSULT NOTE ADULT - SUBJECTIVE AND OBJECTIVE BOX
62 yo male adm 3/1 c/o SOB and weakness, skipped his regular HD due to these complaints.  d/w ICU team 3/5 am, pt examined and d/w family at bedside at that time.  pt intubated 3/3, requiring pressor support. R sc TLC and Becket sump placed.  pt with R groin Dago catheter since prior to admission.  + fevers without clear source, T max 101.8  pt recently completed course of antibiotics for ? infected LUE AV fistula    Past Medical History:  Bleeding ulcer    CKD (chronic kidney disease)    Dialysis patient.    Past Surgical History:  H/O aortic valve replacement.    Drug Dosing Weight  Height (cm): 185.42 (02 Mar 2019 01:04)  Weight (kg): 99.6 (02 Mar 2019 01:04)  BMI (kg/m2): 29 (02 Mar 2019 01:04)  BSA (m2): 2.24 (02 Mar 2019 01:04)  Tm 101.8  intubated, sedated, on levo  +HD today  abd soft, ND, NT, Becket sump in place being used to feed  R fem Dago, R sc TLC, L femoral A line    Labs of note include:  Ca 10.4,  BUN 40, creat 8.0, phos 5.8  Meds and drips reviewed and d/w team on am rounds.

## 2019-03-05 NOTE — PROGRESS NOTE ADULT - SUBJECTIVE AND OBJECTIVE BOX
Nephrology progress note    Patient is seen and examined, events over the last 24 h noted .  Remains on LEvophed. Intubated. On HD this am. Wife at bedside.  Allergies:  Gluten (Unknown)  naproxen (Other)  penicillin (Rash)    Hospital Medications:   MEDICATIONS  (STANDING):  calcium carbonate    500 mG (Tums) Chewable 1 Tablet(s) Chew three times a day  cefepime   IVPB 1000 milliGRAM(s) IV Intermittent every 24 hours  chlorhexidine 0.12% Liquid 15 milliLiter(s) Oral Mucosa two times a day  chlorhexidine 4% Liquid 1 Application(s) Topical <User Schedule>  heparin  Injectable 5000 Unit(s) SubCutaneous every 12 hours  midazolam Infusion 0.02 mG/kG/Hr (1.992 mL/Hr) IV Continuous <Continuous>  midodrine 10 milliGRAM(s) Oral three times a day  norepinephrine Infusion 0.05 MICROgram(s)/kG/Min (4.669 mL/Hr) IV Continuous <Continuous>  pantoprazole   Suspension 40 milliGRAM(s) Oral daily  sevelamer hydrochloride 1600 milliGRAM(s) Oral three times a day with meals        VITALS:  T(F): 101.5 (03-05-19 @ 07:01), Max: 101.8 (03-04-19 @ 20:00)  HR: 80 (03-05-19 @ 09:00)  BP: 130/70 (03-04-19 @ 11:10)  RR: 24 (03-05-19 @ 09:00)  SpO2: 98% (03-05-19 @ 09:00)  Wt(kg): --    03-03 @ 07:01  -  03-04 @ 07:00  --------------------------------------------------------  IN: 1764.4 mL / OUT: 0 mL / NET: 1764.4 mL    03-04 @ 07:01  -  03-05 @ 07:00  --------------------------------------------------------  IN: 1829.2 mL / OUT: 0 mL / NET: 1829.2 mL          PHYSICAL EXAM:  Constitutional: NAD, on vent  Neck: No JVD  Respiratory: coarse BS b/l  Cardiovascular: S1, S2, RRR, 3/6 HSM  Gastrointestinal: BS+, soft, NT/ND  Extremities: Rt [facility] cath No peripheral edema  Neurological: sedated  : No CVA tenderness. No rosenberg.   Skin: No rashes  Vascular Access: [facility] cath    LABS:  03-05    139  |  101  |  40<H>  ----------------------------<  92  4.4   |  23  |  8.0<HH>    Ca    10.4<H>      05 Mar 2019 06:11  Phos  5.9     03-05  Mg     2.1     03-05    TPro  5.9<L>  /  Alb  3.3<L>  /  TBili  0.8  /  DBili      /  AST  14  /  ALT  5   /  AlkPhos  94  03-05                          13.9   12.09 )-----------( 114      ( 05 Mar 2019 06:11 )             44.9       Urine Studies:      RADIOLOGY & ADDITIONAL STUDIES:  < from: Xray Chest 1 View-PORTABLE IMMEDIATE (03.05.19 @ 01:28) >    Slightly improved bilateral pleural effusions and bibasilar opacities,   now right greater than left.    Stable pulmonary vascular congestion.    Support devices as above.    < end of copied text >

## 2019-03-05 NOTE — PROGRESS NOTE ADULT - ASSESSMENT
IMPRESSION:  Septic shock  ESRD   history of graft infection pseudomonas   pulmonary edema volume over load     PLAN:    CNS: On versed    HEENT:  oral care    PULMONARY: keep pox > 92 %.     CARDIOVASCULAR:   Taper pressors   Keep MAP >65  cardiology follow up  keep IS < OS    GI: GI prophylaxis.  Feeding     RENAL: F/U renal. F/U lytes    INFECTIOUS DISEASE: c/w cefepime abx as per ID   follow cx for now   Start Vanco  F/U Gen surgery  Possible OR today for AVF site debridement  RUE US - if negative CT A/P NC    HEMATOLOGICAL:  DVT prophylaxis.    ENDOCRINE:  Follow up FS.  Insulin protocol if needed.    MUSCULOSKELETAL: Bedrest    cont. ICU Monitoring IMPRESSION:  Septic shock  ESRD   history of graft infection pseudomonas   pulmonary edema volume over load     PLAN:    CNS: On versed    HEENT:  oral care    PULMONARY: keep pox > 92 %.     CARDIOVASCULAR:   Taper pressors   Keep MAP >65  cardiology follow up  keep IS < OS    GI: GI prophylaxis.  Feeding     RENAL: F/U renal. F/U lytes    INFECTIOUS DISEASE: c/w cefepime abx as per ID   follow cx for now   Start Vanco  F/U Gen surgery    RUE US - if negative CT A/P NC v. tagged WBC scan    HEMATOLOGICAL:  DVT prophylaxis.    ENDOCRINE:  Follow up FS.  Insulin protocol if needed.    MUSCULOSKELETAL: Bedrest    cont. ICU Monitoring

## 2019-03-05 NOTE — PROGRESS NOTE ADULT - ASSESSMENT
63/M with ESRD on HD MWF, AVR, low back pain, recent AVF infection, chronic back pain presents with weakness and inability to walk, missed his HD on 3/1. Spiked to 103 the other day, started on Cefipime.     Hypercapnic respiratory failure - pCO2 -90, had to be intubate last night.    ESRD - HD today  2 K bath,  UF 1 L on LEvophed    Hypercalcemia 2 to secondary hyperparathyroidism -  phos is high  iPTH 190  on REnagel 800 mg 2 tabs po with meals tid  D/C Calcium Carbonate - calcium is high    CXR reviewed - b/l pl effusions and PVC  CHF - very high BNP    Hypotension due to sepsis - source - femoral Dago vs AVF area or endocarditis  - BCX from Dago cath will be drawn,   - sono of the LEft ligated AVF will be obtained   - will need a PRETTY  EXIT SITE CX in [facility] 2019 was Pseudomonas - Cx on file, BC x was neg. PT has been on ABX for 1 yr as OP records.  - cont Cefipime 1 gr qd    - cortisol and TSH - noted, normal    D/W ICU staff, spoke to wife yesterday for 15 min , asking about all pt's previous doctors, obtained old cultures from [facility].  Wife did not recall me calling her yesterday. Explained to her again the status of her  condition, the plan to find out the source of infection  i. Allquestions were answered.

## 2019-03-05 NOTE — PROGRESS NOTE ADULT - SUBJECTIVE AND OBJECTIVE BOX
DIAGNOSIS:   HOSPITAL DAY #:    STATUS POST:    POST OPERATIVE DAY #:     Vital Signs Last 24 Hrs  T(C): 39.2 (05 Mar 2019 21:00), Max: 39.4 (05 Mar 2019 19:00)  T(F): 102.6 (05 Mar 2019 21:00), Max: 102.9 (05 Mar 2019 19:00)  HR: 86 (05 Mar 2019 21:00) (80 - 112)  BP: --  BP(mean): --  RR: 24 (05 Mar 2019 21:00) (22 - 133)  SpO2: 99% (05 Mar 2019 21:00) (95% - 99%)    SUBJECTIVE: Pt seen    Pain: YES  [ ]   NO [ ]   Nausea: [ ] YES [ ] NO           Vomiting: [ ] YES [ ] NO  Flatus: [ ] YES [ ] NO             Bowel Movement: [ ] YES [ ] NO     Void: [ ]YES [ ]No      REGIS DRAINAGE: SIGNIFICANT [ ]   NOT SIGNIFICANT [ ]   NOT APPLICABLE [ ]  YES [ ] NO    General Appearance: Appears well, NAD  Neck: Supple  Chest: Equal expansion bilaterally, equal breath sounds  CV: Pulse regular presently  Abdomen: Soft [x ] YES [ ]NO  DISTENDED [ ] YES [x ] NO TENDERNESS [ ]YES [x ]NO  INCISIONS: HEALING WELL [ ] YES  [ ] NO ERYTHEMA [ ] YES [ ] NO DRAINAGE [ ] YES  [ ] NO  Extremities: Grossly symmetric, CALF TENDERNESS [ ] YES  [x ] NO  RT arm no gross infection     LABS:                        13.9   12.09 )-----------( 114      ( 05 Mar 2019 06:11 )             44.9     03-05    139  |  101  |  40<H>  ----------------------------<  92  4.4   |  23  |  8.0<HH>    Ca    10.4<H>      05 Mar 2019 06:11  Phos  5.9     03-05  Mg     2.1     03-05    TPro  5.9<L>  /  Alb  3.3<L>  /  TBili  0.8  /  DBili  x   /  AST  14  /  ALT  5   /  AlkPhos  94  03-05            ASSESSMENT:     GOOD POST OP COURSE [ ]  YES  [ ] NO  CONDITION IMPROVING  []  YES  [ ]  NO          PLAN: awaiting sonogram of arm discussed case with DR KHOURY    CONTINUE PRESENT MANAGEMENT  [ ] YES  [ ] NO

## 2019-03-05 NOTE — PROGRESS NOTE ADULT - SUBJECTIVE AND OBJECTIVE BOX
Patient is sedated on vent, remains on pressors, s/p HD and thoracentesis today       T(F): 100.3 (03-05-19 @ 11:01), Max: 101.8 (03-04-19 @ 20:00)  HR: 89 (03-05-19 @ 11:01)  BP: 126/84  RR: 24 (03-05-19 @ 12:15)  SpO2: 97% (03-05-19 @ 10:00) (94% - 98%)    PHYSICAL EXAM:  GENERAL: NAD  HEAD:  Atraumatic, Normocephalic  NERVOUS SYSTEM:  non focal  CHEST/LUNG:  bilateral rhonchi  HEART: Regular rate and rhythm; No murmurs, rubs, or gallops  ABDOMEN: Soft, Nontender, Nondistended; Bowel sounds present  EXTREMITIES:  left shoulder scar       LABS  03-05    139  |  101  |  40<H>  ----------------------------<  92  4.4   |  23  |  8.0<HH>    Ca    10.4<H>      05 Mar 2019 06:11  Phos  5.9     03-05  Mg     2.1     03-05    TPro  5.9<L>  /  Alb  3.3<L>  /  TBili  0.8  /  DBili  x   /  AST  14  /  ALT  5   /  AlkPhos  94  03-05                          13.9   12.09 )-----------( 114      ( 05 Mar 2019 06:11 )             44.9       Mode: Auto Mode: PRVC/ Volume Support  RR (machine): 24  TV (machine): 500  FiO2: 40  PEEP: 8  MAP: 15  PIP: 31    CARDIAC ENZYMES      Troponin T, Serum: 0.09 ng/mL (03-02-19 @ 15:48)      Culture Results:   No growth to date. (03-03-19)  Culture Results:   No growth to date. (03-02-19)    RADIOLOGY  < from: Xray Chest 1 View-PORTABLE IMMEDIATE (03.05.19 @ 01:28) >  Impression:      Slightly improved bilateral pleural effusions and bibasilar opacities,   now right greater than left.    < end of copied text >    MEDICATIONS  (STANDING):  calcium carbonate    500 mG (Tums) Chewable 1 Tablet(s) Chew three times a day  cefepime   IVPB 1000 milliGRAM(s) IV Intermittent every 24 hours  chlorhexidine 0.12% Liquid 15 milliLiter(s) Oral Mucosa two times a day  chlorhexidine 4% Liquid 1 Application(s) Topical <User Schedule>  heparin  Injectable 5000 Unit(s) SubCutaneous every 12 hours  midazolam Infusion 0.02 mG/kG/Hr (1.992 mL/Hr) IV Continuous <Continuous>  midodrine 10 milliGRAM(s) Oral three times a day  norepinephrine Infusion 0.05 MICROgram(s)/kG/Min (4.669 mL/Hr) IV Continuous <Continuous>  pantoprazole   Suspension 40 milliGRAM(s) Oral daily  sevelamer hydrochloride 1600 milliGRAM(s) Oral three times a day with meals  vancomycin  IVPB        MEDICATIONS  (PRN):  acetaminophen   Tablet .. 650 milliGRAM(s) Oral every 6 hours PRN Mild Pain (1 - 3)  oxyCODONE    IR 15 milliGRAM(s) Oral four times a day PRN Moderate Pain (4 - 6)

## 2019-03-05 NOTE — PROGRESS NOTE ADULT - ASSESSMENT
Patient is a 63y old  Male who presents with a chief complaint of shortness of breath; developed acute hypercapnic respiratory failure and was intubated on 3/3/2019 with a picture of septic shock.    # Septic shock  - Unclear source; recurrent AVF infection?  - Still having fevers  - Patient still requiring pressors: will try to wean off  - WBC 12 today  - Patient was being treated for infected AVF (pseudomonas): sensitive to cefepime, see results in chart  - No vegetations seen on 2D echo  - Continue Cefepime for now as per ID  - Will add vancomycin 1gm after HD  - 2 blood cultures negative to date  - F/U repeat blood cultures  - RVP, MRSA negative  - No need for exploration as per surgery; ultrasound showed no abscess  - Sent blood cultures from right femoral Dago cath as per nephrology  - F/U with ID: consider tagged WBC scan?  - Monitor VS; arterial line placed on 3/4/2019    # Acute hypercapnic respiratory failure  - Remains intubated  - CXR still showing bilateral interstitial opacities  - Thoracentesis done today (3/5/2019), 1.5L removed: pH 7.38, glucose 101; f/u fluid albumin, ldh, protein, cell count, gram stain and culture, cytology  - Hemodialysis done today (3/5/2019)  - RVP negative    # ESRD  - On HD: done today 3/5/2019  - Monitor electrolytes daily  - F/U with nephrology    # Hypercalcemia  - Check PTH  - Stable  - F/U with nephrology    # DVT prophylaxis: heparin S/Q Patient is a 63y old  Male who presents with a chief complaint of shortness of breath; developed acute hypercapnic respiratory failure and was intubated on 3/3/2019 with a picture of septic shock.    # Septic shock  - Unclear source; recurrent AVF infection?  - Still having fevers  - Patient still requiring pressors: will try to wean off  - WBC 12 today  - Patient was being treated for infected AVF (pseudomonas): sensitive to cefepime, see results in chart  - No vegetations seen on 2D echo  - Continue Cefepime for now as per ID  - Will add vancomycin 1gm after HD  - 2 blood cultures negative to date  - F/U repeat blood cultures  - RVP, MRSA negative  - No need for exploration as per surgery; ultrasound showed no abscess  - Sent blood cultures from right femoral Dago cath as per nephrology  - F/U with ID: consider tagged WBC scan?  - Monitor VS; arterial line placed on 3/4/2019    # Acute hypercapnic respiratory failure  - Remains intubated  - CXR still showing bilateral interstitial opacities  - Thoracentesis done today (3/5/2019), 1.5L removed: pH 7.38, glucose 101; f/u fluid albumin, ldh, protein, cell count, gram stain and culture, cytology  - Hemodialysis done today (3/5/2019)  - RVP negative    # ESRD  - On HD: done today 3/5/2019  - Monitor electrolytes daily  - F/U with nephrology    # Hypercalcemia  - Check PTH  - Stable  - F/U with nephrology    # DVT prophylaxis: heparin S/Q  # Tube feeds; GI prophylaxis

## 2019-03-05 NOTE — PROGRESS NOTE ADULT - SUBJECTIVE AND OBJECTIVE BOX
ANNIE ARGUETA    Patient is a 63y old  Male who presents with a chief complaint of shortness of breath (05 Mar 2019 09:36)      Interval History/Overnight events: patient stable; still having fevers    T(C): 37.9 (03-05-19 @ 11:01), Max: 38.8 (03-04-19 @ 20:00)  HR: 89 (03-05-19 @ 11:01)  BP: --  RR: 24 (03-05-19 @ 12:15)  SpO2: 97% (03-05-19 @ 10:00)    PHYSICAL EXAM:    GENERAL: Patient intubated, sedated  CHEST/LUNG: Bilateral rhonchi heard  HEART: Regular rate and rhythm; systolic murmur heard on auscultation  ABDOMEN: Soft, Nontender, Nondistended  EXTREMITIES: Mild LE bilateral edema; erythema around previous AVF with discharge    LABS:                        13.9   12.09 )-----------( 114      ( 05 Mar 2019 06:11 )             44.9     03-05    139  |  101  |  40<H>  ----------------------------<  92  4.4   |  23  |  8.0<HH>    Ca    10.4<H>      05 Mar 2019 06:11  Phos  5.9     03-05  Mg     2.1     03-05    TPro  5.9<L>  /  Alb  3.3<L>  /  TBili  0.8  /  DBili  x   /  AST  14  /  ALT  5   /  AlkPhos  94  03-05    Culture - Blood (collected 03 Mar 2019 06:06)  Source: .Blood None  Preliminary Report (04 Mar 2019 18:01):    No growth to date.    LIVER FUNCTIONS - ( 05 Mar 2019 06:11 )  Alb: 3.3 g/dL / Pro: 5.9 g/dL / ALK PHOS: 94 U/L / ALT: 5 U/L / AST: 14 U/L / GGT: x           < from: Xray Chest 1 View-PORTABLE IMMEDIATE (03.05.19 @ 01:28) >  Impression:      Slightly improved bilateral pleural effusions and bibasilar opacities,   now right greater than left.    Stable pulmonary vascular congestion.    < end of copied text >    < from: US Extremity Nonvasc Limited, Left (03.05.19 @ 11:49) >  Screening ultrasound soft tissues left upper extremity reveals a   thrombosed vessel without abscess.    < end of copied text >      MEDICATIONS:    MEDICATIONS  (STANDING):  calcium carbonate    500 mG (Tums) Chewable 1 Tablet(s) Chew three times a day  cefepime   IVPB 1000 milliGRAM(s) IV Intermittent every 24 hours  chlorhexidine 0.12% Liquid 15 milliLiter(s) Oral Mucosa two times a day  chlorhexidine 4% Liquid 1 Application(s) Topical <User Schedule>  heparin  Injectable 5000 Unit(s) SubCutaneous every 12 hours  midazolam Infusion 0.02 mG/kG/Hr (1.992 mL/Hr) IV Continuous <Continuous>  midodrine 10 milliGRAM(s) Oral three times a day  norepinephrine Infusion 0.05 MICROgram(s)/kG/Min (4.669 mL/Hr) IV Continuous <Continuous>  pantoprazole   Suspension 40 milliGRAM(s) Oral daily  sevelamer hydrochloride 1600 milliGRAM(s) Oral three times a day with meals  vancomycin  IVPB      vancomycin  IVPB 1000 milliGRAM(s) IV Intermittent once      MEDICATIONS  (PRN):  acetaminophen   Tablet .. 650 milliGRAM(s) Oral every 6 hours PRN Mild Pain (1 - 3)  oxyCODONE    IR 15 milliGRAM(s) Oral four times a day PRN Moderate Pain (4 - 6)

## 2019-03-05 NOTE — PROGRESS NOTE ADULT - SUBJECTIVE AND OBJECTIVE BOX
Patient is a 63y old  Male who presents with a chief complaint of SOB (04 Mar 2019 11:28)        Over Night Events: No events    ROS:  See HPI    PHYSICAL EXAM    ICU Vital Signs Last 24 Hrs  T(C): 38.6 (05 Mar 2019 07:01), Max: 38.8 (04 Mar 2019 20:00)  T(F): 101.5 (05 Mar 2019 07:01), Max: 101.8 (04 Mar 2019 20:00)  HR: 80 (05 Mar 2019 09:00) (75 - 112)  BP: 130/70 (04 Mar 2019 11:10) (130/70 - 130/70)  BP(mean): 91 (04 Mar 2019 11:10) (91 - 91)  ABP: 123/76 (05 Mar 2019 09:00) (90/55 - 123/76)  ABP(mean): 92 (05 Mar 2019 09:00) (66 - 92)  RR: 24 (05 Mar 2019 09:00) (22 - 36)  SpO2: 98% (05 Mar 2019 09:00) (91% - 98%)    General: Intubated and sedated  HEENT: MARTINE  Lymphatic system: No Cervical LN    Respiratory: [place] BS decreased  Cardiovascular: Regular  Gastrointestinal: Soft. + BS  Musculoskeletal: No clubbing. No edema  Skin: Warm.  Intact  Neurological: Sedated      03-04-19 @ 07:01  -  03-05-19 @ 07:00  --------------------------------------------------------  IN:    Enteral Tube Flush: 335 mL    midazolam Infusion: 331.2 mL    Nepro: 45 mL    norepinephrine Infusion: 1118 mL  Total IN: 1829.2 mL    OUT:  Total OUT: 0 mL    Total NET: 1829.2 mL    LABS:                        13.9   12.09 )-----------( 114      ( 05 Mar 2019 06:11 )             44.9                                               03-05    139  |  101  |  40<H>  ----------------------------<  92  4.4   |  23  |  8.0<HH>    Ca    10.4<H>      05 Mar 2019 06:11  Phos  5.9     03-05  Mg     2.1     03-05    TPro  5.9<L>  /  Alb  3.3<L>  /  TBili  0.8  /  DBili  x   /  AST  14  /  ALT  5   /  AlkPhos  94  03-05                                              LIVER FUNCTIONS - ( 05 Mar 2019 06:11 )  Alb: 3.3 g/dL / Pro: 5.9 g/dL / ALK PHOS: 94 U/L / ALT: 5 U/L / AST: 14 U/L / GGT: x                                                  Culture - Blood (collected 03 Mar 2019 06:06)  Source: .Blood None  Preliminary Report (04 Mar 2019 18:01):    No growth to date.                                                 Mode: Auto Mode: PRVC/ Volume Support  RR (machine): 24  TV (machine): 500  FiO2: 40  PEEP: 8  MAP: 15  PIP: 31                                      ABG - ( 05 Mar 2019 05:10 )  pH, Arterial: 7.28  pH, Blood: x     /  pCO2: 46    /  pO2: 90    / HCO3: 22    / Base Excess: -4.8  /  SaO2: 97            MEDICATIONS  (STANDING):  calcium carbonate    500 mG (Tums) Chewable 1 Tablet(s) Chew three times a day  cefepime   IVPB 1000 milliGRAM(s) IV Intermittent every 24 hours  chlorhexidine 0.12% Liquid 15 milliLiter(s) Oral Mucosa two times a day  chlorhexidine 4% Liquid 1 Application(s) Topical <User Schedule>  heparin  Injectable 5000 Unit(s) SubCutaneous every 12 hours  midazolam Infusion 0.02 mG/kG/Hr (1.992 mL/Hr) IV Continuous <Continuous>  midodrine 10 milliGRAM(s) Oral three times a day  norepinephrine Infusion 0.05 MICROgram(s)/kG/Min (4.669 mL/Hr) IV Continuous <Continuous>  pantoprazole   Suspension 40 milliGRAM(s) Oral daily  sevelamer hydrochloride 1600 milliGRAM(s) Oral three times a day with meals    MEDICATIONS  (PRN):  acetaminophen   Tablet .. 650 milliGRAM(s) Oral every 6 hours PRN Mild Pain (1 - 3)  oxyCODONE    IR 15 milliGRAM(s) Oral four times a day PRN Moderate Pain (4 - 6)      Xrays:                                                                                     ECHO

## 2019-03-05 NOTE — PROGRESS NOTE ADULT - SUBJECTIVE AND OBJECTIVE BOX
infectious diseases progress note:  ANNIE ARGUETA is a 63yMale patient    SOB (SHORTNESS OF BREATH);WEAKNESS;ELEVATED TROPONIN    Sepsis  Acute respiratory failure  Foot dermatitis  ESRD (end stage renal disease)  Hypotension  Elevated troponin  SOB (shortness of breath)      ROS:  UTO     Allergies    Gluten (Unknown)  naproxen (Other)  penicillin (Rash)    Intolerances        ANTIBIOTICS/RELEVANT:  antimicrobials  cefepime   IVPB 2000 milliGRAM(s) IV Intermittent daily  cefepime   IVPB        immunologic:    OTHER:  acetaminophen   Tablet .. 650 milliGRAM(s) Oral every 6 hours PRN  calcium carbonate    500 mG (Tums) Chewable 1 Tablet(s) Chew three times a day  chlorhexidine 4% Liquid 1 Application(s) Topical <User Schedule>  heparin  Injectable 5000 Unit(s) SubCutaneous every 12 hours  midazolam Infusion 0.02 mG/kG/Hr IV Continuous <Continuous>  midodrine 10 milliGRAM(s) Oral three times a day  norepinephrine Infusion 0.05 MICROgram(s)/kG/Min IV Continuous <Continuous>  oxyCODONE    IR 15 milliGRAM(s) Oral four times a day PRN  pantoprazole   Suspension 40 milliGRAM(s) Oral daily  sevelamer hydrochloride 1600 milliGRAM(s) Oral three times a day with meals      Objective:  T(F): 100.8 (03-05-19 @ 03:01), Max: 101.8 (03-04-19 @ 20:00)  HR: 104 (03-05-19 @ 03:00) (24 - 112)  BP: 130/70 (03-04-19 @ 11:10) (100/59 - 130/70)  RR: 24 (03-05-19 @ 03:01) (22 - 36)  SpO2: 95% (03-05-19 @ 03:01) (91% - 100%)    PHYSICAL EXAM:  Constitutional:Well-developed, well nourished  vented	  Neck:no JVD, no lymphadenopathy, supple  Respiratory: CTA ami  Cardiovascular: S1S2 RRR  Gastrointestinal:soft, (+) BS, no HSM  Extremities: swelling LUE       LABS:                        13.8   11.19 )-----------( 125      ( 04 Mar 2019 06:19 )             45.6     03-04    140  |  101  |  28<H>  ----------------------------<  109<H>  4.0   |  21  |  6.6<HH>    Ca    10.3<H>      04 Mar 2019 06:19  Phos  4.9     03-04  Mg     2.0     03-04    TPro  6.0  /  Alb  3.5  /  TBili  0.8  /  DBili  x   /  AST  12  /  ALT  5   /  AlkPhos  98  03-04    PT/INR - ( 03 Mar 2019 06:06 )   PT: 13.50 sec;   INR: 1.18 ratio         PTT - ( 03 Mar 2019 06:06 )  PTT:48.4 sec        MICROBIOLOGY:    Culture - Blood (collected 03-03-19 @ 06:06)  Source: .Blood None  Preliminary Report (03-04-19 @ 18:01):    No growth to date.    Culture - Blood (collected 03-02-19 @ 08:46)  Source: .Blood None  Preliminary Report (03-03-19 @ 20:01):    No growth to date.        Culture - Blood (collected 03 Mar 2019 06:06)  Source: .Blood None  Preliminary Report (04 Mar 2019 18:01):    No growth to date.    Culture - Blood (collected 02 Mar 2019 08:46)  Source: .Blood None  Preliminary Report (03 Mar 2019 20:01):    No growth to date.      Culture Results:   No growth to date. (03-03 @ 06:06)  Culture Results:   No growth to date. (03-02 @ 08:46)        RADIOLOGY & ADDITIONAL STUDIES:

## 2019-03-05 NOTE — PROGRESS NOTE ADULT - ASSESSMENT
Patient intubated sedated on pressors. Check cultures labs. Echo no vegetation . Continue support. Prognosis guarded

## 2019-03-05 NOTE — PROGRESS NOTE ADULT - SUBJECTIVE AND OBJECTIVE BOX
Patient is a 63y old  Male who presents with a chief complaint of SOB (04 Mar 2019 11:28)      T(F): 101.3 (03-05-19 @ 05:00), Max: 101.8 (03-04-19 @ 20:00)  HR: 94 (03-05-19 @ 05:00)  BP: 130/70 (03-04-19 @ 11:10)  RR: 24 (03-05-19 @ 05:01)  SpO2: 96% (03-05-19 @ 05:01) (91% - 100%)    PHYSICAL EXAM:  GENERAL: NAD, well-groomed, well-developed  HEAD:  Atraumatic, Normocephalic  EYES: EOMI, PERRLA, conjunctiva and sclera clear  ENMT: No tonsillar erythema, exudates, or enlargement; Moist mucous membranes, Good dentition, No lesions  NECK: Supple, No JVD, Normal thyroid  NERVOUS SYSTEM:  Alert & Oriented X3,  Motor Strength 5/5 B/L upper and lower extremities  CHEST/LUNG: Clear to percussion bilaterally; No rales, rhonchi, wheezing, or rubs  HEART: Regular rate and rhythm; III/VI rod lsb  ABDOMEN: Soft, Nontender, Nondistended; Bowel sounds present  EXTREMITIES:   No clubbing, cyanosis, or edema  LYMPH: No lymphadenopathy noted  SKIN: No rashes or lesions    labs  03-04    140  |  101  |  28<H>  ----------------------------<  109<H>  4.0   |  21  |  6.6<HH>    Ca    10.3<H>      04 Mar 2019 06:19  Phos  4.9     03-04  Mg     2.0     03-04    TPro  6.0  /  Alb  3.5  /  TBili  0.8  /  DBili  x   /  AST  12  /  ALT  5   /  AlkPhos  98  03-04                          13.8   11.19 )-----------( 125      ( 04 Mar 2019 06:19 )             45.6       Culture - Blood (collected 03 Mar 2019 06:06)  Source: .Blood None  Preliminary Report (04 Mar 2019 18:01):    No growth to date.    Culture - Blood (collected 02 Mar 2019 08:46)  Source: .Blood None  Preliminary Report (03 Mar 2019 20:01):    No growth to date.        Mode: Auto Mode: PRVC/ Volume Support  RR (machine): 24  TV (machine): 500  FiO2: 40  PEEP: 8  MAP: 17  PIP: 31        acetaminophen   Tablet .. 650 milliGRAM(s) Oral every 6 hours PRN  calcium carbonate    500 mG (Tums) Chewable 1 Tablet(s) Chew three times a day  cefepime   IVPB 1000 milliGRAM(s) IV Intermittent every 24 hours  chlorhexidine 0.12% Liquid 15 milliLiter(s) Oral Mucosa two times a day  chlorhexidine 4% Liquid 1 Application(s) Topical <User Schedule>  heparin  Injectable 5000 Unit(s) SubCutaneous every 12 hours  midazolam Infusion 0.02 mG/kG/Hr IV Continuous <Continuous>  midodrine 10 milliGRAM(s) Oral three times a day  norepinephrine Infusion 0.05 MICROgram(s)/kG/Min IV Continuous <Continuous>  oxyCODONE    IR 15 milliGRAM(s) Oral four times a day PRN  pantoprazole   Suspension 40 milliGRAM(s) Oral daily  sevelamer hydrochloride 1600 milliGRAM(s) Oral three times a day with meals

## 2019-03-06 LAB
ALBUMIN FLD-MCNC: 1.4 G/DL — SIGNIFICANT CHANGE UP
ALBUMIN SERPL ELPH-MCNC: 2.9 G/DL — LOW (ref 3.5–5.2)
ALP SERPL-CCNC: 84 U/L — SIGNIFICANT CHANGE UP (ref 30–115)
ALT FLD-CCNC: 6 U/L — SIGNIFICANT CHANGE UP (ref 0–41)
ANION GAP SERPL CALC-SCNC: 12 MMOL/L — SIGNIFICANT CHANGE UP (ref 7–14)
AST SERPL-CCNC: 12 U/L — SIGNIFICANT CHANGE UP (ref 0–41)
BASOPHILS # BLD AUTO: 0.05 K/UL — SIGNIFICANT CHANGE UP (ref 0–0.2)
BASOPHILS NFR BLD AUTO: 0.4 % — SIGNIFICANT CHANGE UP (ref 0–1)
BILIRUB SERPL-MCNC: 0.6 MG/DL — SIGNIFICANT CHANGE UP (ref 0.2–1.2)
BUN SERPL-MCNC: 40 MG/DL — HIGH (ref 10–20)
CALCIUM SERPL-MCNC: 10.4 MG/DL — HIGH (ref 8.5–10.1)
CHLORIDE SERPL-SCNC: 102 MMOL/L — SIGNIFICANT CHANGE UP (ref 98–110)
CO2 SERPL-SCNC: 25 MMOL/L — SIGNIFICANT CHANGE UP (ref 17–32)
CREAT SERPL-MCNC: 6.8 MG/DL — CRITICAL HIGH (ref 0.7–1.5)
EOSINOPHIL # BLD AUTO: 0.08 K/UL — SIGNIFICANT CHANGE UP (ref 0–0.7)
EOSINOPHIL NFR BLD AUTO: 0.6 % — SIGNIFICANT CHANGE UP (ref 0–8)
GLUCOSE SERPL-MCNC: 148 MG/DL — HIGH (ref 70–99)
HCT VFR BLD CALC: 43.6 % — SIGNIFICANT CHANGE UP (ref 42–52)
HGB BLD-MCNC: 13.7 G/DL — LOW (ref 14–18)
IMM GRANULOCYTES NFR BLD AUTO: 0.5 % — HIGH (ref 0.1–0.3)
LDH SERPL L TO P-CCNC: 109 U/L — SIGNIFICANT CHANGE UP
LYMPHOCYTES # BLD AUTO: 1.03 K/UL — LOW (ref 1.2–3.4)
LYMPHOCYTES # BLD AUTO: 8.4 % — LOW (ref 20.5–51.1)
MAGNESIUM SERPL-MCNC: 2.1 MG/DL — SIGNIFICANT CHANGE UP (ref 1.8–2.4)
MCHC RBC-ENTMCNC: 31.1 PG — HIGH (ref 27–31)
MCHC RBC-ENTMCNC: 31.4 G/DL — LOW (ref 32–37)
MCV RBC AUTO: 98.9 FL — HIGH (ref 80–94)
MONOCYTES # BLD AUTO: 1.54 K/UL — HIGH (ref 0.1–0.6)
MONOCYTES NFR BLD AUTO: 12.5 % — HIGH (ref 1.7–9.3)
NEUTROPHILS # BLD AUTO: 9.56 K/UL — HIGH (ref 1.4–6.5)
NEUTROPHILS NFR BLD AUTO: 77.6 % — HIGH (ref 42.2–75.2)
NRBC # BLD: 0 /100 WBCS — SIGNIFICANT CHANGE UP (ref 0–0)
PHOSPHATE SERPL-MCNC: 4.5 MG/DL — SIGNIFICANT CHANGE UP (ref 2.1–4.9)
PLATELET # BLD AUTO: 100 K/UL — LOW (ref 130–400)
POTASSIUM SERPL-MCNC: 3.9 MMOL/L — SIGNIFICANT CHANGE UP (ref 3.5–5)
POTASSIUM SERPL-SCNC: 3.9 MMOL/L — SIGNIFICANT CHANGE UP (ref 3.5–5)
PROT FLD-MCNC: 2.5 G/DL — SIGNIFICANT CHANGE UP
PROT SERPL-MCNC: 5.5 G/DL — LOW (ref 6–8)
RBC # BLD: 4.41 M/UL — LOW (ref 4.7–6.1)
RBC # FLD: 15.9 % — HIGH (ref 11.5–14.5)
SODIUM SERPL-SCNC: 139 MMOL/L — SIGNIFICANT CHANGE UP (ref 135–146)
WBC # BLD: 12.32 K/UL — HIGH (ref 4.8–10.8)
WBC # FLD AUTO: 12.32 K/UL — HIGH (ref 4.8–10.8)

## 2019-03-06 RX ORDER — CIPROFLOXACIN LACTATE 400MG/40ML
400 VIAL (ML) INTRAVENOUS EVERY 24 HOURS
Qty: 0 | Refills: 0 | Status: DISCONTINUED | OUTPATIENT
Start: 2019-03-07 | End: 2019-03-09

## 2019-03-06 RX ORDER — CIPROFLOXACIN LACTATE 400MG/40ML
VIAL (ML) INTRAVENOUS
Qty: 0 | Refills: 0 | Status: DISCONTINUED | OUTPATIENT
Start: 2019-03-06 | End: 2019-03-09

## 2019-03-06 RX ORDER — ACETAMINOPHEN 500 MG
650 TABLET ORAL EVERY 6 HOURS
Qty: 0 | Refills: 0 | Status: DISCONTINUED | OUTPATIENT
Start: 2019-03-06 | End: 2019-03-09

## 2019-03-06 RX ORDER — CIPROFLOXACIN LACTATE 400MG/40ML
400 VIAL (ML) INTRAVENOUS ONCE
Qty: 0 | Refills: 0 | Status: COMPLETED | OUTPATIENT
Start: 2019-03-06 | End: 2019-03-06

## 2019-03-06 RX ADMIN — Medication 200 MILLIGRAM(S): at 16:29

## 2019-03-06 RX ADMIN — CHLORHEXIDINE GLUCONATE 1 APPLICATION(S): 213 SOLUTION TOPICAL at 00:00

## 2019-03-06 RX ADMIN — SEVELAMER CARBONATE 1600 MILLIGRAM(S): 2400 POWDER, FOR SUSPENSION ORAL at 11:41

## 2019-03-06 RX ADMIN — Medication 1 TABLET(S): at 05:28

## 2019-03-06 RX ADMIN — Medication 650 MILLIGRAM(S): at 05:29

## 2019-03-06 RX ADMIN — Medication 650 MILLIGRAM(S): at 11:43

## 2019-03-06 RX ADMIN — MIDODRINE HYDROCHLORIDE 10 MILLIGRAM(S): 2.5 TABLET ORAL at 21:56

## 2019-03-06 RX ADMIN — Medication 1 TABLET(S): at 21:56

## 2019-03-06 RX ADMIN — PANTOPRAZOLE SODIUM 40 MILLIGRAM(S): 20 TABLET, DELAYED RELEASE ORAL at 11:42

## 2019-03-06 RX ADMIN — CHLORHEXIDINE GLUCONATE 15 MILLILITER(S): 213 SOLUTION TOPICAL at 05:41

## 2019-03-06 RX ADMIN — MIDODRINE HYDROCHLORIDE 10 MILLIGRAM(S): 2.5 TABLET ORAL at 05:28

## 2019-03-06 RX ADMIN — CHLORHEXIDINE GLUCONATE 15 MILLILITER(S): 213 SOLUTION TOPICAL at 17:13

## 2019-03-06 RX ADMIN — HEPARIN SODIUM 5000 UNIT(S): 5000 INJECTION INTRAVENOUS; SUBCUTANEOUS at 17:12

## 2019-03-06 RX ADMIN — SEVELAMER CARBONATE 1600 MILLIGRAM(S): 2400 POWDER, FOR SUSPENSION ORAL at 08:59

## 2019-03-06 RX ADMIN — Medication 4.67 MICROGRAM(S)/KG/MIN: at 20:00

## 2019-03-06 RX ADMIN — Medication 650 MILLIGRAM(S): at 13:36

## 2019-03-06 RX ADMIN — Medication 650 MILLIGRAM(S): at 17:13

## 2019-03-06 RX ADMIN — MIDODRINE HYDROCHLORIDE 10 MILLIGRAM(S): 2.5 TABLET ORAL at 13:37

## 2019-03-06 RX ADMIN — SEVELAMER CARBONATE 1600 MILLIGRAM(S): 2400 POWDER, FOR SUSPENSION ORAL at 16:59

## 2019-03-06 RX ADMIN — HEPARIN SODIUM 5000 UNIT(S): 5000 INJECTION INTRAVENOUS; SUBCUTANEOUS at 05:29

## 2019-03-06 RX ADMIN — Medication 1 TABLET(S): at 13:37

## 2019-03-06 NOTE — PROGRESS NOTE ADULT - SUBJECTIVE AND OBJECTIVE BOX
Patient is a 63y old  Male who presents with a chief complaint of SOB / respiratory failure (05 Mar 2019 13:22)        Over Night Events: Still febrile. Remains on pressors and intubated. Off sedation    ROS:  See HPI    PHYSICAL EXAM    ICU Vital Signs Last 24 Hrs  T(C): 38.8 (06 Mar 2019 07:01), Max: 39.4 (05 Mar 2019 19:00)  T(F): 101.8 (06 Mar 2019 07:01), Max: 102.9 (05 Mar 2019 19:00)  HR: 85 (06 Mar 2019 09:00) (78 - 92)  ABP: 92/58 (06 Mar 2019 09:00) (92/58 - 125/70)  ABP(mean): 69 (06 Mar 2019 09:00) (69 - 87)  RR: 42 (06 Mar 2019 09:00) (22 - 133)  SpO2: 98% (06 Mar 2019 09:00) (97% - 100%)    General: Intubated and sedated  HEENT: MARTINE  Lymphatic system: No Cervical LN    Respiratory: [place] BS decreased  Cardiovascular: Regular  Gastrointestinal: Soft. + BS  Musculoskeletal: No clubbing. No edema  Skin: Warm.  Intact  Neurological: Sedated      03-05-19 @ 07:01  -  03-06-19 @ 07:00  --------------------------------------------------------  IN:    Enteral Tube Flush: 200 mL    midazolam Infusion: 100.8 mL    Nepro: 510 mL    norepinephrine Infusion: 925 mL    Solution: 50 mL    Solution: 250 mL  Total IN: 2035.8 mL    OUT:    Other: 1000 mL  Total OUT: 1000 mL    Total NET: 1035.8 mL          LABS:                            13.7   12.32 )-----------( 100      ( 06 Mar 2019 06:19 )             43.6                                               03-06    139  |  102  |  40<H>  ----------------------------<  148<H>  3.9   |  25  |  6.8<HH>    Ca    10.4<H>      06 Mar 2019 06:19  Phos  4.5     03-06  Mg     2.1     03-06    TPro  5.5<L>  /  Alb  2.9<L>  /  TBili  0.6  /  DBili  x   /  AST  12  /  ALT  6   /  AlkPhos  84  03-06                                                                                           LIVER FUNCTIONS - ( 06 Mar 2019 06:19 )  Alb: 2.9 g/dL / Pro: 5.5 g/dL / ALK PHOS: 84 U/L / ALT: 6 U/L / AST: 12 U/L / GGT: x                                                  Culture - Body Fluid with Gram Stain (collected 05 Mar 2019 13:48)  Source: Pleural Fl Pleural fluid  Gram Stain (06 Mar 2019 03:57):    No polymorphonuclear cells seen    No organisms seen                                                   Mode: Auto Mode: PRVC/ Volume Support  RR (machine): 24  TV (machine): 500  FiO2: 40  PEEP: 8  MAP: 14  PIP: 27                                      ABG - ( 06 Mar 2019 06:26 )  pH, Arterial: 7.37  pH, Blood: x     /  pCO2: 44    /  pO2: 101   / HCO3: 26    / Base Excess: 0.1   /  SaO2: 98          MEDICATIONS  (STANDING):  calcium carbonate    500 mG (Tums) Chewable 1 Tablet(s) Chew three times a day  cefepime   IVPB 1000 milliGRAM(s) IV Intermittent every 24 hours  chlorhexidine 0.12% Liquid 15 milliLiter(s) Oral Mucosa two times a day  chlorhexidine 4% Liquid 1 Application(s) Topical <User Schedule>  heparin  Injectable 5000 Unit(s) SubCutaneous every 12 hours  midazolam Infusion 0.02 mG/kG/Hr (1.992 mL/Hr) IV Continuous <Continuous>  midodrine 10 milliGRAM(s) Oral three times a day  norepinephrine Infusion 0.05 MICROgram(s)/kG/Min (4.669 mL/Hr) IV Continuous <Continuous>  pantoprazole   Suspension 40 milliGRAM(s) Oral daily  sevelamer hydrochloride 1600 milliGRAM(s) Oral three times a day with meals  vancomycin  IVPB        MEDICATIONS  (PRN):  acetaminophen   Tablet .. 650 milliGRAM(s) Oral every 6 hours PRN Mild Pain (1 - 3)  oxyCODONE    IR 15 milliGRAM(s) Oral four times a day PRN Moderate Pain (4 - 6)

## 2019-03-06 NOTE — PROGRESS NOTE ADULT - SUBJECTIVE AND OBJECTIVE BOX
ANNIE ARGUETA    Patient is a 63y old  Male who presents with a chief complaint of SOB (06 Mar 2019 10:04)      Interval History/Overnight events:    T(C): 39.4 (03-06-19 @ 11:01), Max: 39.4 (03-05-19 @ 19:00)  HR: 90 (03-06-19 @ 11:01)  BP: --  RR: 24 (03-06-19 @ 11:01)  SpO2: 98% (03-06-19 @ 09:00)    PHYSICAL EXAM:    GENERAL: Patient intubated  CHEST/LUNG: Bilateral rhonchi heard  HEART: Regular rate and rhythm; systolic murmur heard on auscultation  ABDOMEN: Soft, Nontender, Nondistended  EXTREMITIES: Mild LE bilateral edema; erythema around previous AVF with discharge  NEURO: patient off sedation, not responding to commands      LABS:                          13.7   12.32 )-----------( 100      ( 06 Mar 2019 06:19 )             43.6     03-06    139  |  102  |  40<H>  ----------------------------<  148<H>  3.9   |  25  |  6.8<HH>    Ca    10.4<H>      06 Mar 2019 06:19  Phos  4.5     03-06  Mg     2.1     03-06    TPro  5.5<L>  /  Alb  2.9<L>  /  TBili  0.6  /  DBili  x   /  AST  12  /  ALT  6   /  AlkPhos  84  03-06    Culture - Body Fluid with Gram Stain (collected 05 Mar 2019 13:48)  Source: Pleural Fl Pleural fluid  Gram Stain (06 Mar 2019 03:57):    No polymorphonuclear cells seen    No organisms seen    Culture - Nose (collected 04 Mar 2019 20:21)  Source: .Nose Nose  Preliminary Report (06 Mar 2019 11:46):    Culture in progress    LIVER FUNCTIONS - ( 06 Mar 2019 06:19 )  Alb: 2.9 g/dL / Pro: 5.5 g/dL / ALK PHOS: 84 U/L / ALT: 6 U/L / AST: 12 U/L / GGT: x             MEDICATIONS:    MEDICATIONS  (STANDING):  calcium carbonate    500 mG (Tums) Chewable 1 Tablet(s) Chew three times a day  chlorhexidine 0.12% Liquid 15 milliLiter(s) Oral Mucosa two times a day  chlorhexidine 4% Liquid 1 Application(s) Topical <User Schedule>  heparin  Injectable 5000 Unit(s) SubCutaneous every 12 hours  midazolam Infusion 0.02 mG/kG/Hr (1.992 mL/Hr) IV Continuous <Continuous>  midodrine 10 milliGRAM(s) Oral three times a day  norepinephrine Infusion 0.05 MICROgram(s)/kG/Min (4.669 mL/Hr) IV Continuous <Continuous>  pantoprazole   Suspension 40 milliGRAM(s) Oral daily  sevelamer hydrochloride 1600 milliGRAM(s) Oral three times a day with meals  vancomycin  IVPB          MEDICATIONS  (PRN):  acetaminophen    Suspension .. 650 milliGRAM(s) Oral every 6 hours PRN Temp greater or equal to 38C (100.4F)  oxyCODONE    IR 15 milliGRAM(s) Oral four times a day PRN Moderate Pain (4 - 6) ANNIE ARGUETA    Patient is a 63y old  Male who presents with a chief complaint of SOB (06 Mar 2019 10:04)      Interval History/Overnight events: No acute events overnight    T(C): 39.4 (03-06-19 @ 11:01), Max: 39.4 (03-05-19 @ 19:00)  HR: 90 (03-06-19 @ 11:01)  BP: --  RR: 24 (03-06-19 @ 11:01)  SpO2: 98% (03-06-19 @ 09:00)    PHYSICAL EXAM:    GENERAL: Patient intubated  CHEST/LUNG: Bilateral rhonchi heard  HEART: Regular rate and rhythm; systolic murmur heard on auscultation  ABDOMEN: Soft, Nontender, Nondistended  EXTREMITIES: Mild LE bilateral edema; erythema around previous AVF with discharge  NEURO: patient off sedation, not responding to commands      LABS:                          13.7   12.32 )-----------( 100      ( 06 Mar 2019 06:19 )             43.6     03-06    139  |  102  |  40<H>  ----------------------------<  148<H>  3.9   |  25  |  6.8<HH>    Ca    10.4<H>      06 Mar 2019 06:19  Phos  4.5     03-06  Mg     2.1     03-06    TPro  5.5<L>  /  Alb  2.9<L>  /  TBili  0.6  /  DBili  x   /  AST  12  /  ALT  6   /  AlkPhos  84  03-06    Culture - Body Fluid with Gram Stain (collected 05 Mar 2019 13:48)  Source: Pleural Fl Pleural fluid  Gram Stain (06 Mar 2019 03:57):    No polymorphonuclear cells seen    No organisms seen    Culture - Nose (collected 04 Mar 2019 20:21)  Source: .Nose Nose  Preliminary Report (06 Mar 2019 11:46):    Culture in progress    LIVER FUNCTIONS - ( 06 Mar 2019 06:19 )  Alb: 2.9 g/dL / Pro: 5.5 g/dL / ALK PHOS: 84 U/L / ALT: 6 U/L / AST: 12 U/L / GGT: x           < from: US Extremity Nonvasc Limited, Left (03.05.19 @ 11:49) >  Screening ultrasound soft tissues left upper extremity reveals a   thrombosed vessel without abscess.    < end of copied text >      < from: Xray Chest 1 View- PORTABLE-Routine (03.06.19 @ 05:42) >  Impression:      Stable bilateral pleural effusions left greater than right. No definite   pneumothorax status post thoracentesis.    < end of copied text >      MEDICATIONS:    MEDICATIONS  (STANDING):  calcium carbonate    500 mG (Tums) Chewable 1 Tablet(s) Chew three times a day  chlorhexidine 0.12% Liquid 15 milliLiter(s) Oral Mucosa two times a day  chlorhexidine 4% Liquid 1 Application(s) Topical <User Schedule>  heparin  Injectable 5000 Unit(s) SubCutaneous every 12 hours  midazolam Infusion 0.02 mG/kG/Hr (1.992 mL/Hr) IV Continuous <Continuous>  midodrine 10 milliGRAM(s) Oral three times a day  norepinephrine Infusion 0.05 MICROgram(s)/kG/Min (4.669 mL/Hr) IV Continuous <Continuous>  pantoprazole   Suspension 40 milliGRAM(s) Oral daily  sevelamer hydrochloride 1600 milliGRAM(s) Oral three times a day with meals  vancomycin  IVPB          MEDICATIONS  (PRN):  acetaminophen    Suspension .. 650 milliGRAM(s) Oral every 6 hours PRN Temp greater or equal to 38C (100.4F)  oxyCODONE    IR 15 milliGRAM(s) Oral four times a day PRN Moderate Pain (4 - 6)

## 2019-03-06 NOTE — PROGRESS NOTE ADULT - ASSESSMENT
63/M with ESRD on HD MWF, AVR, low back pain, recent AVF infection, chronic back pain presents with weakness and inability to walk, missed his HD on 3/1. Spiked to 103 the other day, started on Cefipime.     Hypercapnic respiratory failure - pCO2 -90, intubated, off Versed since 3 pm yesterday, unresponsive.    ESRD - HD  will be tomorrow  Versed not dialyzable    Hypercalcemia 2 to secondary hyperparathyroidism -  phos is high  iPTH 190  on REnagel 800 mg 2 tabs po with meals tid  D/C Calcium Carbonate - calcium is high    CXR reviewed - b/l pl effusions and PVC, s/p thoracentesis - no empyema  CHF - very high BNP    Hypotension due to sepsis - source - femoral Dago vs AVF area or endocarditis  - BCX from Dago cath  drawn yesterday,   - sono of the LEft ligated AVF neg for collection  - will need a PRETTY  EXIT SITE CX in J[place] 2019 was Pseudomonas - Cx on file, BC x was neg. PT has been on ABX for 1 yr as OP records.  Pt will be switched to CIpro (possible allergy to Cefipime?)   Please adjust the dose to 200 mg IV q12 hrs or 400 mg IV 24    D/W wife and HS  Pt's wife wants her  transferred to [place].  Asked to obtain the name of accepting physician will need ICU and to be hemodynamically stable    Prognosis guarded

## 2019-03-06 NOTE — PROGRESS NOTE ADULT - SUBJECTIVE AND OBJECTIVE BOX
Nephrology progress note    Patient is seen and examined, events over the last 24 h noted .  S/p HD yesterday, remains on vent and pressors  Allergies:  Gluten (Unknown)  naproxen (Other)  penicillin (Rash)    Hospital Medications:   MEDICATIONS  (STANDING):  calcium carbonate    500 mG (Tums) Chewable 1 Tablet(s) Chew three times a day  chlorhexidine 0.12% Liquid 15 milliLiter(s) Oral Mucosa two times a day  chlorhexidine 4% Liquid 1 Application(s) Topical <User Schedule>  ciprofloxacin   IVPB      ciprofloxacin   IVPB 400 milliGRAM(s) IV Intermittent once  heparin  Injectable 5000 Unit(s) SubCutaneous every 12 hours  midazolam Infusion 0.02 mG/kG/Hr (1.992 mL/Hr) IV Continuous <Continuous>  midodrine 10 milliGRAM(s) Oral three times a day  norepinephrine Infusion 0.05 MICROgram(s)/kG/Min (4.669 mL/Hr) IV Continuous <Continuous>  pantoprazole   Suspension 40 milliGRAM(s) Oral daily  sevelamer hydrochloride 1600 milliGRAM(s) Oral three times a day with meals  vancomycin  IVPB            VITALS:  T(F): 102.9 (03-06-19 @ 11:01), Max: 102.9 (03-05-19 @ 19:00)  HR: 90 (03-06-19 @ 11:01)  BP: --  RR: 24 (03-06-19 @ 11:01)  SpO2: 98% (03-06-19 @ 09:00)  Wt(kg): --    03-04 @ 07:01  -  03-05 @ 07:00  --------------------------------------------------------  IN: 1829.2 mL / OUT: 0 mL / NET: 1829.2 mL    03-05 @ 07:01  -  03-06 @ 07:00  --------------------------------------------------------  IN: 2035.8 mL / OUT: 1000 mL / NET: 1035.8 mL          PHYSICAL EXAM:  Constitutional: NAD on vent  Respiratory: CTAB,   Cardiovascular: S1, S2, RRR  Gastrointestinal: BS+, soft, NT/ND  Extremities: No peripheral edema  Neurological: unresponsive  : No CVA tenderness. No rosenberg.   Skin: No rashes  Vascular Access:    LABS:  03-06    139  |  102  |  40<H>  ----------------------------<  148<H>  3.9   |  25  |  6.8<HH>    Ca    10.4<H>      06 Mar 2019 06:19  Phos  4.5     03-06  Mg     2.1     03-06    TPro  5.5<L>  /  Alb  2.9<L>  /  TBili  0.6  /  DBili      /  AST  12  /  ALT  6   /  AlkPhos  84  03-06                          13.7   12.32 )-----------( 100      ( 06 Mar 2019 06:19 )             43.6       Urine Studies:      RADIOLOGY & ADDITIONAL STUDIES:  < from: Xray Chest 1 View- PORTABLE-Routine (03.06.19 @ 05:42) >  Stable bilateral pleural effusions left greater than right. No definite   pneumothorax status post thoracentesis.    Stable support devices.    < end of copied text >

## 2019-03-06 NOTE — PROGRESS NOTE ADULT - SUBJECTIVE AND OBJECTIVE BOX
Patient is a 63y old  Male who presents with a chief complaint of SOB / respiratory failure (05 Mar 2019 13:22)      T(F): 101.5 (03-06-19 @ 05:00), Max: 102.9 (03-05-19 @ 19:00)  HR: 89 (03-06-19 @ 05:00)  BP: --  RR: 35 (03-06-19 @ 05:00)  SpO2: 99% (03-06-19 @ 05:00) (97% - 100%)    PHYSICAL EXAM:  GENERAL: NAD, well-groomed, well-developed  HEAD:  Atraumatic, Normocephalic  EYES: EOMI, PERRLA, conjunctiva and sclera clear  ENMT: No tonsillar erythema, exudates, or enlargement; Moist mucous membranes, Good dentition, No lesions  NECK: Supple, No JVD, Normal thyroid  NERVOUS SYSTEM:  Alert & Oriented X3,  Motor Strength 5/5 B/L upper and lower extremities  CHEST/LUNG: Clear to percussion bilaterally; No rales, rhonchi, wheezing, or rubs  HEART: Regular rate and rhythm; No murmurs, rubs, or gallops  ABDOMEN: Soft, Nontender, Nondistended; Bowel sounds present  EXTREMITIES:   No clubbing, cyanosis, or edema  LYMPH: No lymphadenopathy noted  SKIN: No rashes or lesions    labs  03-06    139  |  102  |  40<H>  ----------------------------<  148<H>  3.9   |  25  |  x     Ca    10.4<H>      06 Mar 2019 06:19  Phos  4.5     03-06  Mg     2.1     03-06    TPro  5.5<L>  /  Alb  2.9<L>  /  TBili  0.6  /  DBili  x   /  AST  12  /  ALT  6   /  AlkPhos  84  03-06                          13.9   12.09 )-----------( 114      ( 05 Mar 2019 06:11 )             44.9       Culture - Body Fluid with Gram Stain (collected 05 Mar 2019 13:48)  Source: Pleural Fl Pleural fluid  Gram Stain (06 Mar 2019 03:57):    No polymorphonuclear cells seen    No organisms seen        Mode: Auto Mode: PRVC/ Volume Support  RR (machine): 24  TV (machine): 500  FiO2: 40  PEEP: 8  MAP: 14  PIP: 27        acetaminophen   Tablet .. 650 milliGRAM(s) Oral every 6 hours PRN  calcium carbonate    500 mG (Tums) Chewable 1 Tablet(s) Chew three times a day  cefepime   IVPB 1000 milliGRAM(s) IV Intermittent every 24 hours  chlorhexidine 0.12% Liquid 15 milliLiter(s) Oral Mucosa two times a day  chlorhexidine 4% Liquid 1 Application(s) Topical <User Schedule>  heparin  Injectable 5000 Unit(s) SubCutaneous every 12 hours  midazolam Infusion 0.02 mG/kG/Hr IV Continuous <Continuous>  midodrine 10 milliGRAM(s) Oral three times a day  norepinephrine Infusion 0.05 MICROgram(s)/kG/Min IV Continuous <Continuous>  oxyCODONE    IR 15 milliGRAM(s) Oral four times a day PRN  pantoprazole   Suspension 40 milliGRAM(s) Oral daily  sevelamer hydrochloride 1600 milliGRAM(s) Oral three times a day with meals  vancomycin  IVPB

## 2019-03-06 NOTE — PROGRESS NOTE ADULT - ASSESSMENT
IMPRESSION:  Septic shock - still on pressors  ESRD  history of graft infection pseudomonas   pulmonary edema volume over load     PLAN:    CNS: Keep off sedation    HEENT:  oral care    PULMONARY: keep pox > 92 %. No vent changes    CARDIOVASCULAR:  Taper pressors  Keep MAP >65  cardiology follow up  keep IS < OS    GI: GI prophylaxis.  Feeding     RENAL: F/U renal. F/U lytes    INFECTIOUS DISEASE: c/w cefepime abx as per ID   follow cx for now   Start Vanco  F/U Gen surgery  CT A/P NC v. tagged WBC scan    HEMATOLOGICAL:  DVT prophylaxis.    ENDOCRINE:  Follow up FS.  Insulin protocol if needed.    MUSCULOSKELETAL: Bedrest    cont. ICU Monitoring IMPRESSION:  Septic shock - still on pressors  ESRD  history of graft infection pseudomonas   pulmonary edema volume over load     PLAN:    CNS: Keep off sedation    HEENT:  oral care    PULMONARY: keep pox > 92 %. No vent changes  f/u thora results    CARDIOVASCULAR:  Taper pressors  Keep MAP >65  cardiology follow up  keep IS < OS    GI: GI prophylaxis.  Feeding     RENAL: F/U renal. F/U lytes    INFECTIOUS DISEASE: c/w cefepime abx as per ID   follow cx for now   Start Vanco  F/U Gen surgery  CT A/P NC v. tagged WBC scan    HEMATOLOGICAL:  DVT prophylaxis.    ENDOCRINE:  Follow up FS.  Insulin protocol if needed.    MUSCULOSKELETAL: Bedrest    cont. ICU Monitoring

## 2019-03-06 NOTE — PROGRESS NOTE ADULT - SUBJECTIVE AND OBJECTIVE BOX
Patient is seen and examined at the bed side, is Febrile. He remains intubated and on pressor.          REVIEW OF SYSTEMS: Unable to obtain since intubated       ICU Vital Signs Last 24 Hrs  T(C): 39 (06 Mar 2019 21:00), Max: 39.4 (06 Mar 2019 11:01)  T(F): 102.2 (06 Mar 2019 21:00), Max: 102.9 (06 Mar 2019 11:01)  HR: 86 (06 Mar 2019 21:00) (78 - 96)  BP: --  BP(mean): --  ABP: 101/59 (06 Mar 2019 21:00) (91/57 - 115/65)  ABP(mean): 73 (06 Mar 2019 21:00) (69 - 81)  RR: 43 (06 Mar 2019 21:00) (24 - 43)  SpO2: 99% (06 Mar 2019 21:00) (97% - 100%)        PHYSICAL EXAM:  GENERAL: Intubated/vented  CHEST/LUNG: Air entry  bilaterally  HEART: s1 and s2 present   ABDOMEN:  Nontender and  Nondistended  EXTREMITIES:  Left upper arm AVF site has drainage from ? sinus track  CNS: Intubated/vented        MEDICATIONS  (STANDING):  calcium carbonate    500 mG (Tums) Chewable 1 Tablet(s) Chew three times a day  chlorhexidine 0.12% Liquid 15 milliLiter(s) Oral Mucosa two times a day  chlorhexidine 4% Liquid 1 Application(s) Topical <User Schedule>  ciprofloxacin   IVPB      heparin  Injectable 5000 Unit(s) SubCutaneous every 12 hours  midazolam Infusion 0.02 mG/kG/Hr (1.992 mL/Hr) IV Continuous <Continuous>  midodrine 10 milliGRAM(s) Oral three times a day  norepinephrine Infusion 0.05 MICROgram(s)/kG/Min (4.669 mL/Hr) IV Continuous <Continuous>  pantoprazole   Suspension 40 milliGRAM(s) Oral daily  sevelamer hydrochloride 1600 milliGRAM(s) Oral three times a day with meals  vancomycin  IVPB        MEDICATIONS  (PRN):  acetaminophen    Suspension .. 650 milliGRAM(s) Oral every 6 hours PRN Temp greater or equal to 38C (100.4F)  oxyCODONE    IR 15 milliGRAM(s) Oral four times a day PRN Moderate Pain (4 - 6)      Allergies    Gluten (Unknown)  naproxen (Other)  penicillin (Rash)        LABS:                        13.8   11.19 )-----------( 125      ( 04 Mar 2019 06:19 )             45.6     03-04    140  |  101  |  28<H>  ----------------------------<  109<H>  4.0   |  21  |  6.6<HH>    Ca    10.3<H>      04 Mar 2019 06:19  Phos  4.9     03-04  Mg     2.0     03-04    TPro  6.0  /  Alb  3.5  /  TBili  0.8  /  DBili  x   /  AST  12  /  ALT  5   /  AlkPhos  98  03-04    PT/INR - ( 03 Mar 2019 06:06 )   PT: 13.50 sec;   INR: 1.18 ratio    PTT - ( 03 Mar 2019 06:06 )  PTT:48.4 sec          RADIOLOGY & ADDITIONAL TESTS:    < from: Xray Chest 1 View- PORTABLE-Routine (03.04.19 @ 06:39) >    Improved aeration to left lung with residual left mid to lower lung field   opacity.    Stable right basilar opacity.    Bilateral interstitial opacities    < end of copied text >      < from: Xray Chest 1 View-PORTABLE IMMEDIATE (03.02.19 @ 19:13) >  Status post a median sternotomy and cardiomegaly    Bilateral opacities.    Stent overlies the left axillary region.    No significant change from March 1, 2018.        < end of copied text > Patient is seen and examined at the bed side, is Febrile. He remains intubated and on pressor.  ?? Gallium scan          REVIEW OF SYSTEMS: Unable to obtain since intubated       ICU Vital Signs Last 24 Hrs  T(C): 39 (06 Mar 2019 21:00), Max: 39.4 (06 Mar 2019 11:01)  T(F): 102.2 (06 Mar 2019 21:00), Max: 102.9 (06 Mar 2019 11:01)  HR: 86 (06 Mar 2019 21:00) (78 - 96)  BP: --  BP(mean): --  ABP: 101/59 (06 Mar 2019 21:00) (91/57 - 115/65)  ABP(mean): 73 (06 Mar 2019 21:00) (69 - 81)  RR: 43 (06 Mar 2019 21:00) (24 - 43)  SpO2: 99% (06 Mar 2019 21:00) (97% - 100%)        PHYSICAL EXAM:  GENERAL: Intubated/vented  CHEST/LUNG: Air entry  bilaterally  HEART: s1 and s2 present   ABDOMEN:  Nontender and  Nondistended  EXTREMITIES:  Left upper arm AVF site has drainage from ? sinus track  CNS: Intubated/vented        MEDICATIONS  (STANDING):  calcium carbonate    500 mG (Tums) Chewable 1 Tablet(s) Chew three times a day  chlorhexidine 0.12% Liquid 15 milliLiter(s) Oral Mucosa two times a day  chlorhexidine 4% Liquid 1 Application(s) Topical <User Schedule>  ciprofloxacin   IVPB      heparin  Injectable 5000 Unit(s) SubCutaneous every 12 hours  midazolam Infusion 0.02 mG/kG/Hr (1.992 mL/Hr) IV Continuous <Continuous>  midodrine 10 milliGRAM(s) Oral three times a day  norepinephrine Infusion 0.05 MICROgram(s)/kG/Min (4.669 mL/Hr) IV Continuous <Continuous>  pantoprazole   Suspension 40 milliGRAM(s) Oral daily  sevelamer hydrochloride 1600 milliGRAM(s) Oral three times a day with meals  vancomycin  IVPB        MEDICATIONS  (PRN):  acetaminophen    Suspension .. 650 milliGRAM(s) Oral every 6 hours PRN Temp greater or equal to 38C (100.4F)  oxyCODONE    IR 15 milliGRAM(s) Oral four times a day PRN Moderate Pain (4 - 6)      Allergies    Gluten (Unknown)  naproxen (Other)  penicillin (Rash)        LABS:                        13.8   11.19 )-----------( 125      ( 04 Mar 2019 06:19 )             45.6         03-04    140  |  101  |  28<H>  ----------------------------<  109<H>  4.0   |  21  |  6.6<HH>    Ca    10.3<H>      04 Mar 2019 06:19  Phos  4.9     03-04  Mg     2.0     03-04    TPro  6.0  /  Alb  3.5  /  TBili  0.8  /  DBili  x   /  AST  12  /  ALT  5   /  AlkPhos  98  03-04    PT/INR - ( 03 Mar 2019 06:06 )   PT: 13.50 sec;   INR: 1.18 ratio    PTT - ( 03 Mar 2019 06:06 )  PTT:48.4 sec          RADIOLOGY & ADDITIONAL TESTS:    < from: US Extremity Nonvasc Limited, Left (03.05.19 @ 11:49) >  Screening ultrasound soft tissues left upper extremity reveals a   thrombosed vessel without abscess.    < end of copied text >      < from: Xray Chest 1 View- PORTABLE-Routine (03.04.19 @ 06:39) >    Improved aeration to left lung with residual left mid to lower lung field   opacity.    Stable right basilar opacity.    Bilateral interstitial opacities    < end of copied text >      < from: Xray Chest 1 View-PORTABLE IMMEDIATE (03.02.19 @ 19:13) >  Status post a median sternotomy and cardiomegaly    Bilateral opacities.    Stent overlies the left axillary region. No significant change from March 1, 2018.        MICROBIOLOGY DATA:    Culture - Body Fluid with Gram Stain (03.05.19 @ 13:48)    Gram Stain:   No polymorphonuclear cells seen  No organisms seen    Specimen Source: Pleural Fl Pleural fluid    Culture Results:   No growth      Culture - Blood (03.05.19 @ 10:00)    Specimen Source: .Blood Blood-Catheter    Culture Results:   No growth to date.      Rapid Respiratory Viral Panel (03.04.19 @ 17:00)    Rapid RVP Result: NotDetec: This Respiratory Panel uses polymerase chain reaction (PCR) to detect for  adenovirus; coronavirus (HKU1, NL63, 229E, OC43); human metapneumovirus  (hMPV); human enterovirus/rhinovirus (Entero/RV); influenza A; influenza  A/H1; influenza A/H3; influenza A/H1-2009; influenza B; parainfluenza  viruses 1, 2, 3, 4; respiratory syncytial virus; Mycoplasma pneumoniae;  and Chlamydophila pneumoniae.      MRSA/MSSA PCR (03.04.19 @ 16:00)    MRSA PCR Result.: Negative: By: Real-Time PCR (Polymerase Reaction Method)

## 2019-03-06 NOTE — PROGRESS NOTE ADULT - ASSESSMENT
Patient is a 63y old  Male who presents with a chief complaint of shortness of breath; developed acute hypercapnic respiratory failure and was intubated on 3/3/2019 in setting of septic shock.    # Septic shock  - Unclear source; recurrent AVF infection?  - Still having fevers  - Patient still requiring pressors: will try to wean off  - WBC 12.32 today  - Patient was being treated for infected AVF (pseudomonas) prior to presentation and completed course of antibiotics: sensitive to cefepime, see results in chart  - No vegetations seen on 2D echo  - 2 blood cultures negative to date  - RVP, MRSA negative  - No need for exploration as per surgery; ultrasound showed no abscess  - Sent blood cultures from right femoral Dago cath as per nephrology: f/u results  - F/U with ID: fevers might be secondary to medications; will discontinue Cefepime and switch to Ciprofloxacin 400mg IV q24h (previous pseudomonas infection was sensitive to ciprofloxacin); check gallium scan for source of infection; continue vancomycin for now and check level  - Monitor VS; arterial line placed on 3/4/2019    # Acute hypercapnic respiratory failure  - Remains intubated  - CXR still showing bilateral interstitial opacities  - Thoracentesis done today (3/5/2019), 1.5L removed: pH 7.38, glucose 101; f/u fluid albumin, ldh, protein, cell count, gram stain and culture, cytology  - Hemodialysis done today (3/5/2019)  - RVP negative    # ESRD  - On HD: done today 3/5/2019  - Monitor electrolytes daily  - F/U with nephrology    # Hypercalcemia  - Check PTH  - Stable  - F/U with nephrology    # DVT prophylaxis: heparin S/Q  # Tube feeds; GI prophylaxis Patient is a 63y old  Male who presents with a chief complaint of shortness of breath; developed acute hypercapnic respiratory failure and was intubated on 3/3/2019 in setting of septic shock.    # Septic shock  - Unclear source; recurrent AVF infection?  - Still having fevers  - Patient still requiring pressors: will try to wean off  - WBC 12.32 today  - Patient was being treated for infected AVF (pseudomonas) prior to presentation and completed course of antibiotics: sensitive to cefepime, see results in chart  - No vegetations seen on 2D echo  - 2 blood cultures negative to date  - RVP, MRSA negative    - No need for exploration as per surgery; ultrasound showed no abscess  - Sent blood cultures from right femoral Dago cath as per nephrology: f/u results  - F/U with ID: fevers might be secondary to medications; will discontinue Cefepime and switch to Ciprofloxacin 400mg IV q24h for now (previous pseudomonas infection was sensitive to ciprofloxacin); check gallium scan for source of infection; continue vancomycin for now and check level  - Monitor VS; arterial line placed on 3/4/2019    # Acute hypercapnic respiratory failure  - Remains intubated  - CXR still showing bilateral interstitial opacities  - Thoracentesis done today (3/5/2019), 1.5L removed: pH 7.38, glucose 101; Gram stain negative; transudative pleural effusion  - Culture and cytology pending  - Last hemodialysis done on 3/5/2019  - RVP negative    # ESRD  - On HD: last done on 3/5/2019  - Monitor electrolytes daily  - Patient has been on Midazolam infusion for sedation that was discontinued on 3/5/2019; patient's current mental status can be explained by the fact the Midazolam is not dialyzable and that it effect will probably last for a few days   - F/U with nephrology    # Hypercalcemia  - Stable  - F/U with nephrology    # DVT prophylaxis: heparin S/Q  # Tube feeds; GI prophylaxis

## 2019-03-06 NOTE — PROGRESS NOTE ADULT - ASSESSMENT
63M    ESRD on HD  H/O aortic valve replacement  HX AVF infection with pseudomonas (1/2/19- s/p vanc/gent but (I) gent, 4/2018, 3/2018), R fluor    # Septic shock- on pressor  # Infected AVF site- draining from ? sinus tract  # Respiratory  failure - s/p intubation - CXR Bilateral opacities.    would recommend:  1. Obtain Imaging of Left upper extremities  to rule out Abscess/fluid collection  2. Follow up RVP and MRSA PCR  3. Monitor Temp. and c/w supportive care  4. Obtain Vancomycin level and Re-dose if level is less than 15  5. Management of Vent as per ICU protocol   6. Aspiration precaution     d/w CCU team 63M    ESRD on HD  H/O aortic valve replacement  HX AVF infection with pseudomonas (1/2/19- s/p vanc/gent but (I) gent, 4/2018, 3/2018), R fluor    # Septic shock- on pressor, source is not clear yet  # Infected AVF site- draining from ? sinus tract  # Respiratory  failure - s/p intubation - CXR Bilateral opacities.  # Persistent Fever -R/O drug fever         would recommend:  1. Discontinue Vancomycin to rule out DRUG fever  2. CT scan  of Left upper extremities  to rule out Abscess/fluid collection  3.  Monitor Temp. and c/w supportive care  4. Management of Vent as per ICU protocol   5. Aspiration precaution     d/w CCU team

## 2019-03-06 NOTE — PROGRESS NOTE ADULT - SUBJECTIVE AND OBJECTIVE BOX
pt seen and examined; still vented/sedated  -abx changed to cipro; arrange Gallium scan   -Nephrology following; likely HD tomorrow   -discussed with family at bedside during rounds  -prognosis guarded     Vital Signs Last 24 Hrs  T(C): 39.4 (06 Mar 2019 11:01), Max: 39.4 (05 Mar 2019 19:00)  T(F): 102.9 (06 Mar 2019 11:01), Max: 102.9 (05 Mar 2019 19:00)  HR: 90 (06 Mar 2019 11:01) (78 - 92)  RR: 24 (06 Mar 2019 11:01) (24 - 133)  SpO2: 98% (06 Mar 2019 09:00) (98% - 100%)    PHYSICAL EXAM:  GENERAL: NAD  HEAD:  Atraumatic, Normocephalic  NERVOUS SYSTEM:  non focal  CHEST/LUNG:  bilateral rhonchi  CV/HEART: Regular rate and rhythm; No murmurs, rubs, or gallops  GI/ABDOMEN: Soft, Nontender, Nondistended; Bowel sounds present  EXTREMITIES:  left shoulder scar       LABS                        13.7   12.32 )-----------( 100      ( 06 Mar 2019 06:19 )             43.6   03-06    139  |  102  |  40<H>  ----------------------------<  148<H>  3.9   |  25  |  6.8<HH>    Ca    10.4<H>      06 Mar 2019 06:19  Phos  4.5     03-06  Mg     2.1     03-06    TPro  5.5<L>  /  Alb  2.9<L>  /  TBili  0.6  /  DBili  x   /  AST  12  /  ALT  6   /  AlkPhos  84  03-06    03-05    139  |  101  |  40<H>  ----------------------------<  92  4.4   |  23  |  8.0<HH>    Ca    10.4<H>      05 Mar 2019 06:11  Phos  5.9     03-05  Mg     2.1     03-05    TPro  5.9<L>  /  Alb  3.3<L>  /  TBili  0.8  /  DBili  x   /  AST  14  /  ALT  5   /  AlkPhos  94  03-05    CARDIAC ENZYMES      Troponin T, Serum: 0.09 ng/mL (03-02-19 @ 15:48)      Culture Results:   No growth to date. (03-03-19)  Culture Results:   No growth to date. (03-02-19)    Xray Chest 1 View- PORTABLE-Routine (03.06.19 @ 05:42)     Impression:      Stable bilateral pleural effusions left greater than right. No definite   pneumothorax status post thoracentesis.    Stable support devices.      MEDICATIONS  (STANDING):  calcium carbonate    500 mG (Tums) Chewable 1 Tablet(s) Chew three times a day  chlorhexidine 0.12% Liquid 15 milliLiter(s) Oral Mucosa two times a day  chlorhexidine 4% Liquid 1 Application(s) Topical <User Schedule>  heparin  Injectable 5000 Unit(s) SubCutaneous every 12 hours  midazolam Infusion 0.02 mG/kG/Hr (1.992 mL/Hr) IV Continuous <Continuous>  midodrine 10 milliGRAM(s) Oral three times a day  norepinephrine Infusion 0.05 MICROgram(s)/kG/Min (4.669 mL/Hr) IV Continuous <Continuous>  pantoprazole   Suspension 40 milliGRAM(s) Oral daily  sevelamer hydrochloride 1600 milliGRAM(s) Oral three times a day with meals  vancomycin  IVPB        MEDICATIONS  (PRN):  acetaminophen    Suspension .. 650 milliGRAM(s) Oral every 6 hours PRN Temp greater or equal to 38C (100.4F)  oxyCODONE    IR 15 milliGRAM(s) Oral four times a day PRN Moderate Pain (4 - 6)

## 2019-03-06 NOTE — PROGRESS NOTE ADULT - ASSESSMENT
Patient intubated sedated on pressors. Check cultures labs. Echo no vegetation . Check cultures. Check cbc. Sed rate low 9. Continue support. Prognosis guarded

## 2019-03-07 ENCOUNTER — APPOINTMENT (OUTPATIENT)
Dept: CARDIOLOGY | Facility: CLINIC | Age: 64
End: 2019-03-07

## 2019-03-07 LAB
ALBUMIN SERPL ELPH-MCNC: 2.8 G/DL — LOW (ref 3.5–5.2)
ALP SERPL-CCNC: 82 U/L — SIGNIFICANT CHANGE UP (ref 30–115)
ALT FLD-CCNC: 6 U/L — SIGNIFICANT CHANGE UP (ref 0–41)
ANION GAP SERPL CALC-SCNC: 12 MMOL/L — SIGNIFICANT CHANGE UP (ref 7–14)
AST SERPL-CCNC: 13 U/L — SIGNIFICANT CHANGE UP (ref 0–41)
BASE EXCESS BLDA CALC-SCNC: 3.7 MMOL/L — HIGH (ref -2–2)
BASOPHILS # BLD AUTO: 0.04 K/UL — SIGNIFICANT CHANGE UP (ref 0–0.2)
BASOPHILS NFR BLD AUTO: 0.4 % — SIGNIFICANT CHANGE UP (ref 0–1)
BILIRUB SERPL-MCNC: 0.6 MG/DL — SIGNIFICANT CHANGE UP (ref 0.2–1.2)
BUN SERPL-MCNC: 58 MG/DL — HIGH (ref 10–20)
CALCIUM SERPL-MCNC: 10.7 MG/DL — HIGH (ref 8.5–10.1)
CHLORIDE SERPL-SCNC: 104 MMOL/L — SIGNIFICANT CHANGE UP (ref 98–110)
CO2 SERPL-SCNC: 26 MMOL/L — SIGNIFICANT CHANGE UP (ref 17–32)
CREAT SERPL-MCNC: 7.3 MG/DL — CRITICAL HIGH (ref 0.7–1.5)
CULTURE RESULTS: SIGNIFICANT CHANGE UP
CULTURE RESULTS: SIGNIFICANT CHANGE UP
EOSINOPHIL # BLD AUTO: 0.12 K/UL — SIGNIFICANT CHANGE UP (ref 0–0.7)
EOSINOPHIL NFR BLD AUTO: 1.2 % — SIGNIFICANT CHANGE UP (ref 0–8)
GLUCOSE SERPL-MCNC: 142 MG/DL — HIGH (ref 70–99)
HCO3 BLDA-SCNC: 28 MMOL/L — HIGH (ref 23–27)
HCT VFR BLD CALC: 42.8 % — SIGNIFICANT CHANGE UP (ref 42–52)
HGB BLD-MCNC: 13.3 G/DL — LOW (ref 14–18)
HOROWITZ INDEX BLDA+IHG-RTO: 40 — SIGNIFICANT CHANGE UP
IMM GRANULOCYTES NFR BLD AUTO: 0.4 % — HIGH (ref 0.1–0.3)
LYMPHOCYTES # BLD AUTO: 1.16 K/UL — LOW (ref 1.2–3.4)
LYMPHOCYTES # BLD AUTO: 11.8 % — LOW (ref 20.5–51.1)
MAGNESIUM SERPL-MCNC: 2.2 MG/DL — SIGNIFICANT CHANGE UP (ref 1.8–2.4)
MCHC RBC-ENTMCNC: 30.5 PG — SIGNIFICANT CHANGE UP (ref 27–31)
MCHC RBC-ENTMCNC: 31.1 G/DL — LOW (ref 32–37)
MCV RBC AUTO: 98.2 FL — HIGH (ref 80–94)
MONOCYTES # BLD AUTO: 1.53 K/UL — HIGH (ref 0.1–0.6)
MONOCYTES NFR BLD AUTO: 15.5 % — HIGH (ref 1.7–9.3)
NEUTROPHILS # BLD AUTO: 6.97 K/UL — HIGH (ref 1.4–6.5)
NEUTROPHILS NFR BLD AUTO: 70.7 % — SIGNIFICANT CHANGE UP (ref 42.2–75.2)
NRBC # BLD: 0 /100 WBCS — SIGNIFICANT CHANGE UP (ref 0–0)
PCO2 BLDA: 43 MMHG — HIGH (ref 38–42)
PH BLDA: 7.43 — HIGH (ref 7.38–7.42)
PHOSPHATE SERPL-MCNC: 4.3 MG/DL — SIGNIFICANT CHANGE UP (ref 2.1–4.9)
PLATELET # BLD AUTO: 92 K/UL — LOW (ref 130–400)
PO2 BLDA: 116 MMHG — HIGH (ref 78–95)
POTASSIUM SERPL-MCNC: 4.3 MMOL/L — SIGNIFICANT CHANGE UP (ref 3.5–5)
POTASSIUM SERPL-SCNC: 4.3 MMOL/L — SIGNIFICANT CHANGE UP (ref 3.5–5)
PROT SERPL-MCNC: 5.6 G/DL — LOW (ref 6–8)
RBC # BLD: 4.36 M/UL — LOW (ref 4.7–6.1)
RBC # FLD: 15.7 % — HIGH (ref 11.5–14.5)
SAO2 % BLDA: 99 % — HIGH (ref 94–98)
SODIUM SERPL-SCNC: 142 MMOL/L — SIGNIFICANT CHANGE UP (ref 135–146)
SPECIMEN SOURCE: SIGNIFICANT CHANGE UP
SPECIMEN SOURCE: SIGNIFICANT CHANGE UP
WBC # BLD: 9.86 K/UL — SIGNIFICANT CHANGE UP (ref 4.8–10.8)
WBC # FLD AUTO: 9.86 K/UL — SIGNIFICANT CHANGE UP (ref 4.8–10.8)

## 2019-03-07 RX ORDER — VANCOMYCIN HCL 1 G
1000 VIAL (EA) INTRAVENOUS EVERY 24 HOURS
Qty: 0 | Refills: 0 | Status: DISCONTINUED | OUTPATIENT
Start: 2019-03-07 | End: 2019-03-08

## 2019-03-07 RX ADMIN — Medication 200 MILLIGRAM(S): at 18:04

## 2019-03-07 RX ADMIN — MIDODRINE HYDROCHLORIDE 10 MILLIGRAM(S): 2.5 TABLET ORAL at 21:54

## 2019-03-07 RX ADMIN — CHLORHEXIDINE GLUCONATE 1 APPLICATION(S): 213 SOLUTION TOPICAL at 05:29

## 2019-03-07 RX ADMIN — HEPARIN SODIUM 5000 UNIT(S): 5000 INJECTION INTRAVENOUS; SUBCUTANEOUS at 05:28

## 2019-03-07 RX ADMIN — SEVELAMER CARBONATE 1600 MILLIGRAM(S): 2400 POWDER, FOR SUSPENSION ORAL at 18:02

## 2019-03-07 RX ADMIN — Medication 1 TABLET(S): at 18:04

## 2019-03-07 RX ADMIN — MIDODRINE HYDROCHLORIDE 10 MILLIGRAM(S): 2.5 TABLET ORAL at 18:02

## 2019-03-07 RX ADMIN — SEVELAMER CARBONATE 1600 MILLIGRAM(S): 2400 POWDER, FOR SUSPENSION ORAL at 08:45

## 2019-03-07 RX ADMIN — MIDODRINE HYDROCHLORIDE 10 MILLIGRAM(S): 2.5 TABLET ORAL at 05:28

## 2019-03-07 RX ADMIN — HEPARIN SODIUM 5000 UNIT(S): 5000 INJECTION INTRAVENOUS; SUBCUTANEOUS at 18:03

## 2019-03-07 RX ADMIN — Medication 1 TABLET(S): at 05:28

## 2019-03-07 RX ADMIN — Medication 650 MILLIGRAM(S): at 03:20

## 2019-03-07 RX ADMIN — Medication 4.67 MICROGRAM(S)/KG/MIN: at 07:00

## 2019-03-07 RX ADMIN — Medication 4.67 MICROGRAM(S)/KG/MIN: at 03:19

## 2019-03-07 RX ADMIN — Medication 1 TABLET(S): at 21:54

## 2019-03-07 RX ADMIN — Medication 250 MILLIGRAM(S): at 18:03

## 2019-03-07 RX ADMIN — CHLORHEXIDINE GLUCONATE 15 MILLILITER(S): 213 SOLUTION TOPICAL at 18:03

## 2019-03-07 RX ADMIN — CHLORHEXIDINE GLUCONATE 15 MILLILITER(S): 213 SOLUTION TOPICAL at 05:28

## 2019-03-07 RX ADMIN — Medication 650 MILLIGRAM(S): at 08:57

## 2019-03-07 RX ADMIN — PANTOPRAZOLE SODIUM 40 MILLIGRAM(S): 20 TABLET, DELAYED RELEASE ORAL at 18:03

## 2019-03-07 NOTE — PROGRESS NOTE ADULT - ASSESSMENT
63/M with ESRD on HD MWF, AVR, low back pain, recent AVF infection, chronic back pain presents with weakness and inability to walk, missed his HD on 3/1.   Hypercapnic respiratory failure - pCO2 -90, intubated, off Versed since 3 pm day#2 now, unresponsive.    Septic shock - source - femoral Dago vs AVF area or endocarditis  - BCX from Dago cath  neg  - sono of the LEft ligated AVF neg for collection  - will need a PRETTY  - Gallium injected yesterday, will be read today and tomorrow  D/W Dr Terry (nuclear medicine) - uptake of Galium is usually within 6 hrs, it should not interfere with dialysis    EXIT SITE CX in JAn 2019 was Pseudomonas - Cx on file, BC x was neg. PT has been on ABX for 1 yr as OP records.    Please adjust the dose to 200 mg IV q12 hrs or 400 mg IV 24  Vanco ordered was well post HD  - plan to do HD tomorrow as well and d/c Dago cath if no other source is found.    ESRD HD for 3 hrs  2 K bath  UF 1 l As kerline    Hypercalcemia 2 to secondary hyperparathyroidism -  phos is high  iPTH 190  on REnagel 800 mg 2 tabs po with meals tid    CXR reviewed - b/l pl effusions and PVC, s/p thoracentesis - no empyema  CHF - very high BNP    MS - cont to monitor MS after Versed  D/W wife and HS    Prognosis guarded

## 2019-03-07 NOTE — PROGRESS NOTE ADULT - SUBJECTIVE AND OBJECTIVE BOX
Patient is still febrile, sedated comfortably on vent      T(F): 102.7 (03-07-19 @ 11:06), Max: 102.9 (03-06-19 @ 17:00)  HR: 89 (03-07-19 @ 11:06)  BP: 94/60 (03-07-19 @ 11:06)  RR: 24 (03-07-19 @ 11:06)  SpO2: 99% (03-07-19 @ 11:00) (97% - 100%)    PHYSICAL EXAM:  GENERAL: NAD  HEAD:  Atraumatic, Normocephalic  NERVOUS SYSTEM:  no focal deficits  CHEST/LUNG: Clear to percussion bilaterally; No rales, rhonchi, wheezing, or rubs  HEART: Regular rate and rhythm; No murmurs, rubs, or gallops  ABDOMEN: Soft, Nontender, Nondistended; Bowel sounds present  EXTREMITIES:  left shoulder scar      LABS  03-07    142  |  104  |  58<H>  ----------------------------<  142<H>  4.3   |  26  |  7.3<HH>    Ca    10.7<H>      07 Mar 2019 06:40  Phos  4.3     03-07  Mg     2.2     03-07    TPro  5.6<L>  /  Alb  2.8<L>  /  TBili  0.6  /  DBili  x   /  AST  13  /  ALT  6   /  AlkPhos  82  03-07                          13.3   9.86  )-----------( 92       ( 07 Mar 2019 06:40 )             42.8       Mode: Auto Mode: PRVC/ Volume Support  RR (machine): 24  TV (machine): 500  FiO2: 40  PEEP: 5  MAP: 11  PIP: 24      Culture Results:   No growth (03-05-19)  Culture Results:   No growth to date. (03-05-19)  Culture Results:   No growth to date. (03-05-19)  Culture Results:   Culture in progress (03-04-19)  Culture Results:   No growth to date. (03-03-19)  Culture Results:   No growth to date. (03-02-19)    RADIOLOGY  < from: Xray Chest 1 View- PORTABLE-Routine (03.07.19 @ 05:47) >  Impression:      Stable bilateral pleural effusion/opacities.    < end of copied text >    MEDICATIONS  (STANDING):  calcium carbonate    500 mG (Tums) Chewable 1 Tablet(s) Chew three times a day  chlorhexidine 0.12% Liquid 15 milliLiter(s) Oral Mucosa two times a day  chlorhexidine 4% Liquid 1 Application(s) Topical <User Schedule>  ciprofloxacin   IVPB 400 milliGRAM(s) IV Intermittent every 24 hours  heparin  Injectable 5000 Unit(s) SubCutaneous every 12 hours  midodrine 10 milliGRAM(s) Oral three times a day  norepinephrine Infusion 0.05 MICROgram(s)/kG/Min (4.669 mL/Hr) IV Continuous <Continuous>  pantoprazole   Suspension 40 milliGRAM(s) Oral daily  sevelamer hydrochloride 1600 milliGRAM(s) Oral three times a day with meals  vancomycin  IVPB        MEDICATIONS  (PRN):  acetaminophen    Suspension .. 650 milliGRAM(s) Oral every 6 hours PRN Temp greater or equal to 38C (100.4F)  oxyCODONE    IR 15 milliGRAM(s) Oral four times a day PRN Moderate Pain (4 - 6)

## 2019-03-07 NOTE — PROGRESS NOTE ADULT - ASSESSMENT
Patient is a 63y old  Male who presents with a chief complaint of shortness of breath; developed acute hypercapnic respiratory failure and was intubated on 3/3/2019 in setting of septic shock.    # Septic shock  - Unclear source; recurrent AVF infection?  - Still having fevers  - Patient still requiring pressors: will try to wean off  - WBC 13.3 today  - Patient was being treated for infected AVF (pseudomonas) prior to presentation and completed course of antibiotics: sensitive to cefepime, see results in chart  - No vegetations seen on 2D echo  - Blood cultures negative to date  - RVP, MRSA negative    - No need for exploration as per surgery; ultrasound showed no abscess  - Sent blood cultures from right femoral Dago cath as per nephrology: no growth to date  - F/U with ID: fevers might be secondary to medications; will discontinue Cefepime and switch to Ciprofloxacin 400mg IV q24h for now (previous pseudomonas infection was sensitive to ciprofloxacin); pending gallium scan for source of infection; continue vancomycin for now and check level  - Monitor VS; arterial line placed on 3/4/2019    # Acute hypercapnic respiratory failure  - Remains intubated  - CXR still showing bilateral interstitial opacities  - Thoracentesis done today (3/5/2019), 1.5L removed: pH 7.38, glucose 101; Gram stain negative; transudative pleural effusion  - Culture and cytology pending  - Last hemodialysis done on 3/5/2019  - RVP negative    # ESRD  - On HD: last done on 3/5/2019  - Monitor electrolytes daily  - Patient has been on Midazolam infusion for sedation that was discontinued on 3/5/2019; patient's current mental status can be explained by the fact the Midazolam is not dialyzable and that it effect will probably last for a few days   - F/U with nephrology    # Hypercalcemia  - Stable  - F/U with nephrology    # DVT prophylaxis: heparin S/Q  # Tube feeds; GI prophylaxis Patient is a 63y old  Male who presents with a chief complaint of shortness of breath; developed acute hypercapnic respiratory failure and was intubated on 3/3/2019 in setting of septic shock.    # Septic shock  - Unclear source; recurrent AVF infection?  - Still having fevers with Tmax of 102.9 degrees F  - Patient still requiring pressors: will try to wean off  - WBC 9.86 today  - Patient was being treated for infected AVF (pseudomonas) prior to presentation and completed course of antibiotics: sensitive to cefepime, see results in chart  - No vegetations seen on 2D echo  - Blood cultures negative to date  - RVP, MRSA negative    - No need for exploration as per surgery; ultrasound showed no abscess  - Sent blood cultures from right femoral Dago cath as per nephrology: no growth to date  - F/U with ID: fevers might be secondary to medications; will discontinue Cefepime and switch to Ciprofloxacin 400mg IV q24h for now (previous pseudomonas infection was sensitive to ciprofloxacin); pending gallium scan for source of infection; continue vancomycin post HD for now and check level  - Monitor VS; arterial line placed on 3/4/2019    # Acute hypercapnic respiratory failure  - Remains intubated  - CXR showing stable bilateral interstitial opacities  - Thoracentesis done on 3/5/2019, 1.5L removed: pH 7.38, glucose 101; Gram stain negative; transudative pleural effusion  - Culture and cytology pending  - Last hemodialysis done on 3/5/2019; will get HD today (3/7/2019) and tomorrow (3/8/2019)  - RVP negative    # ESRD  - On HD: last done on 3/5/2019; to be done today after gallium scan and tomorrow (3/8/2019)  - Monitor electrolytes daily  - Patient has been on Midazolam infusion for sedation that was discontinued on 3/5/2019; patient's current mental status can be explained by the fact the Midazolam is not dialyzable and that it effect will probably last for a few days   - F/U with nephrology    # Hypercalcemia  - Stable  - F/U with nephrology    # DVT prophylaxis: heparin S/Q  # Tube feeds; GI prophylaxis

## 2019-03-07 NOTE — PROGRESS NOTE ADULT - ASSESSMENT
IMPRESSION:  Septic shock - still on pressors  ESRD  history of graft infection pseudomonas  pulmonary edema volume over load     PLAN:    CNS: Keep off sedation    HEENT:  oral care    PULMONARY: keep pox > 92 %. No vent changes  f/u thora results    CARDIOVASCULAR:  Taper pressors  Keep MAP >65  cardiology follow up    GI: GI prophylaxis.  Feeding     RENAL: F/U renal. F/U lytes    INFECTIOUS DISEASE: c/w Cipro/Vanco  follow cx for now   F/U Gen surgery  Tagged WBC scan today    HEMATOLOGICAL:  DVT prophylaxis.    ENDOCRINE:  Follow up FS.  Insulin protocol if needed.    MUSCULOSKELETAL: Bedrest    cont. ICU Monitoring

## 2019-03-07 NOTE — PROGRESS NOTE ADULT - ASSESSMENT
Patient intubated off sedation on pressors. Check cultures labs. Echo no vegetation . Check cbc. Sed rate low 9. Continue support. Prognosis guarded

## 2019-03-07 NOTE — PROGRESS NOTE ADULT - PROBLEM SELECTOR PLAN 2
cefepime, Vanco  ID consult  check pan cultures  source may be left shoulder wound vs pneumonia  wean pressors as tolerated
cefepime, Vanco  ID consult  check pan cultures  source may be left shoulder wound vs pneumonia  gallium scan today  wean pressors as tolerated
cefepime, Vanco  ID consult  check pan cultures  source may be left shoulder wound vs pneumonia  wean pressors as tolerated
due to renal disease vs sepsis
unknown etiology  -Gallium Scan  -ID follow up   -add cipro   -check vanco trough

## 2019-03-07 NOTE — PROGRESS NOTE ADULT - PROBLEM SELECTOR PLAN 4
continue HD 3 x weekly
HD as per Nephrology      # Progress Note Handoff  PENDING as follows  consults: ID F/u  Test: kidney monitoring   Other: Galium Scan   Family discussion: with wife at bedside   Disposition:  Home ____ or SNF _likely____ Other _____ Unknown at this time ____    *** family does not have an accepting physician at [facility]     prognosis poor
continue HD 3 x weekly

## 2019-03-07 NOTE — PROGRESS NOTE ADULT - SUBJECTIVE AND OBJECTIVE BOX
Patient is a 63y old  Male who presents with a chief complaint of Respiratory failure / septic shock (06 Mar 2019 13:53)      T(F): 102.7 (03-07-19 @ 05:00), Max: 102.9 (03-06-19 @ 11:01)  HR: 88 (03-07-19 @ 05:00)  BP: --  RR: 31 (03-07-19 @ 05:00)  SpO2: 99% (03-07-19 @ 05:00) (97% - 100%)    PHYSICAL EXAM:  GENERAL: NAD, well-groomed, well-developed  HEAD:  Atraumatic, Normocephalic  EYES: EOMI, PERRLA, conjunctiva and sclera clear  ENMT: No tonsillar erythema, exudates, or enlargement; Moist mucous membranes, Good dentition, No lesions  NECK: Supple, No JVD, Normal thyroid  NERVOUS SYSTEM:  Alert & Oriented X3,  Motor Strength 5/5 B/L upper and lower extremities  CHEST/LUNG: Clear to percussion bilaterally; No rales, rhonchi, wheezing, or rubs  HEART: Regular rate and rhythm;  rubs, or gallops. III/Vi [place] lsab  ABDOMEN: Soft, Nontender, Nondistended; Bowel sounds present  EXTREMITIES:   No clubbing, cyanosis, or edema  LYMPH: No lymphadenopathy noted  SKIN: No rashes or lesions    labs  03-06    139  |  102  |  40<H>  ----------------------------<  148<H>  3.9   |  25  |  6.8<HH>    Ca    10.4<H>      06 Mar 2019 06:19  Phos  4.5     03-06  Mg     2.1     03-06    TPro  5.5<L>  /  Alb  2.9<L>  /  TBili  0.6  /  DBili  x   /  AST  12  /  ALT  6   /  AlkPhos  84  03-06                          13.7   12.32 )-----------( 100      ( 06 Mar 2019 06:19 )             43.6       Culture - Body Fluid with Gram Stain (collected 05 Mar 2019 13:48)  Source: Pleural Fl Pleural fluid  Gram Stain (06 Mar 2019 03:57):    No polymorphonuclear cells seen    No organisms seen  Preliminary Report (06 Mar 2019 21:03):    No growth    Culture - Blood (collected 05 Mar 2019 10:00)  Source: .Blood Blood-Catheter  Preliminary Report (06 Mar 2019 23:01):    No growth to date.    Culture - Blood (collected 05 Mar 2019 10:00)  Source: .Blood Blood  Preliminary Report (06 Mar 2019 23:01):    No growth to date.    Culture - Nose (collected 04 Mar 2019 20:21)  Source: .Nose Nose  Preliminary Report (06 Mar 2019 11:46):    Culture in progress        Mode: Auto Mode: PRVC/ Volume Support  RR (machine): 24  TV (machine): 500  FiO2: 40  PEEP: 5  MAP: 14  PIP: 28        acetaminophen    Suspension .. 650 milliGRAM(s) Oral every 6 hours PRN  calcium carbonate    500 mG (Tums) Chewable 1 Tablet(s) Chew three times a day  chlorhexidine 0.12% Liquid 15 milliLiter(s) Oral Mucosa two times a day  chlorhexidine 4% Liquid 1 Application(s) Topical <User Schedule>  ciprofloxacin   IVPB      ciprofloxacin   IVPB 400 milliGRAM(s) IV Intermittent every 24 hours  heparin  Injectable 5000 Unit(s) SubCutaneous every 12 hours  midazolam Infusion 0.02 mG/kG/Hr IV Continuous <Continuous>  midodrine 10 milliGRAM(s) Oral three times a day  norepinephrine Infusion 0.05 MICROgram(s)/kG/Min IV Continuous <Continuous>  oxyCODONE    IR 15 milliGRAM(s) Oral four times a day PRN  pantoprazole   Suspension 40 milliGRAM(s) Oral daily  sevelamer hydrochloride 1600 milliGRAM(s) Oral three times a day with meals  vancomycin  IVPB

## 2019-03-07 NOTE — PROGRESS NOTE ADULT - SUBJECTIVE AND OBJECTIVE BOX
ANNIE ARGUETA    Patient is a 63y old  Male who presents with a chief complaint of SOB, Missed HD (07 Mar 2019 09:29)      Interval History/Overnight events: No acute events overnight    T(C): 39.3 (03-07-19 @ 11:06), Max: 39.4 (03-06-19 @ 17:00)  HR: 89 (03-07-19 @ 11:06)  BP: 94/60 (03-07-19 @ 11:06)  RR: 24 (03-07-19 @ 11:06)  SpO2: 99% (03-07-19 @ 11:00)    PHYSICAL EXAM:    GENERAL: Patient intubated  CHEST/LUNG: Bilateral rhonchi heard  HEART: Regular rate and rhythm; systolic murmur heard on auscultation  ABDOMEN: Soft, Nontender, Nondistended  EXTREMITIES: Minimal LE bilateral edema; erythema around previous AVF   NEURO: patient off sedation, not responding to commands      LABS:                          13.3   9.86  )-----------( 92       ( 07 Mar 2019 06:40 )             42.8     03-07    142  |  104  |  58<H>  ----------------------------<  142<H>  4.3   |  26  |  7.3<HH>    Ca    10.7<H>      07 Mar 2019 06:40  Phos  4.3     03-07  Mg     2.2     03-07    TPro  5.6<L>  /  Alb  2.8<L>  /  TBili  0.6  /  DBili  x   /  AST  13  /  ALT  6   /  AlkPhos  82  03-07      Culture - Body Fluid with Gram Stain (collected 05 Mar 2019 13:48)  Source: Pleural Fl Pleural fluid  Gram Stain (06 Mar 2019 03:57):    No polymorphonuclear cells seen    No organisms seen  Preliminary Report (06 Mar 2019 21:03):    No growth    Culture - Blood (collected 05 Mar 2019 10:00)  Source: .Blood Blood-Catheter  Preliminary Report (06 Mar 2019 23:01):    No growth to date.    Culture - Blood (collected 05 Mar 2019 10:00)  Source: .Blood Blood  Preliminary Report (06 Mar 2019 23:01):    No growth to date.    Culture - Nose (collected 04 Mar 2019 20:21)  Source: .Nose Nose  Final Report (07 Mar 2019 13:59):    No MRSA isolated    No Staph Aureus (MSSA) isolated "This can represent normal nasal    carriage.  PCR is more sensitive for identifying MRSA/MSSA carriage"    LIVER FUNCTIONS - ( 07 Mar 2019 06:40 )  Alb: 2.8 g/dL / Pro: 5.6 g/dL / ALK PHOS: 82 U/L / ALT: 6 U/L / AST: 13 U/L / GGT: x        < from: Xray Chest 1 View- PORTABLE-Routine (03.07.19 @ 05:47) >  Impression:      Stable bilateral pleural effusion/opacities.    Stable support devices.    < end of copied text >    < from: US Extremity Nonvasc Limited, Left (03.05.19 @ 11:49) >  Screening ultrasound soft tissues left upper extremity reveals a   thrombosed vessel without abscess.    < end of copied text >             MEDICATIONS:    MEDICATIONS  (STANDING):  calcium carbonate    500 mG (Tums) Chewable 1 Tablet(s) Chew three times a day  chlorhexidine 0.12% Liquid 15 milliLiter(s) Oral Mucosa two times a day  chlorhexidine 4% Liquid 1 Application(s) Topical <User Schedule>  ciprofloxacin   IVPB      ciprofloxacin   IVPB 400 milliGRAM(s) IV Intermittent every 24 hours  heparin  Injectable 5000 Unit(s) SubCutaneous every 12 hours  midodrine 10 milliGRAM(s) Oral three times a day  norepinephrine Infusion 0.05 MICROgram(s)/kG/Min (4.669 mL/Hr) IV Continuous <Continuous>  pantoprazole   Suspension 40 milliGRAM(s) Oral daily  sevelamer hydrochloride 1600 milliGRAM(s) Oral three times a day with meals  vancomycin  IVPB          MEDICATIONS  (PRN):  acetaminophen    Suspension .. 650 milliGRAM(s) Oral every 6 hours PRN Temp greater or equal to 38C (100.4F)  oxyCODONE    IR 15 milliGRAM(s) Oral four times a day PRN Moderate Pain (4 - 6)

## 2019-03-07 NOTE — PROGRESS NOTE ADULT - PROBLEM SELECTOR PLAN 3
due to renal disease vs sepsis vs type 2 nstemi  cardiology following
-likely underlying renal failure
due to renal disease vs sepsis vs type 2 nstemi  cardiology following
due to renal disease vs sepsis vs type 2 nstemi  cardiology following
on Levophed

## 2019-03-07 NOTE — PROGRESS NOTE ADULT - PROBLEM SELECTOR PROBLEM 4
ESRD (end stage renal disease)

## 2019-03-07 NOTE — PROGRESS NOTE ADULT - SUBJECTIVE AND OBJECTIVE BOX
infectious diseases progress note:  ANNIE ARGUETA is a 63yMale patient    SOB (SHORTNESS OF BREATH);WEAKNESS;ELEVATED TROPONIN    Sepsis, due to unspecified organism  Sepsis  Acute respiratory failure  Foot dermatitis  ESRD (end stage renal disease)  Hypotension  Elevated troponin  SOB (shortness of breath)      ROS:  UTO     Allergies    Gluten (Unknown)  naproxen (Other)  penicillin (Rash)    Intolerances        ANTIBIOTICS/RELEVANT:  antimicrobials  ciprofloxacin   IVPB      ciprofloxacin   IVPB 400 milliGRAM(s) IV Intermittent every 24 hours  vancomycin  IVPB        immunologic:    OTHER:  acetaminophen    Suspension .. 650 milliGRAM(s) Oral every 6 hours PRN  calcium carbonate    500 mG (Tums) Chewable 1 Tablet(s) Chew three times a day  chlorhexidine 0.12% Liquid 15 milliLiter(s) Oral Mucosa two times a day  chlorhexidine 4% Liquid 1 Application(s) Topical <User Schedule>  heparin  Injectable 5000 Unit(s) SubCutaneous every 12 hours  midazolam Infusion 0.02 mG/kG/Hr IV Continuous <Continuous>  midodrine 10 milliGRAM(s) Oral three times a day  norepinephrine Infusion 0.05 MICROgram(s)/kG/Min IV Continuous <Continuous>  oxyCODONE    IR 15 milliGRAM(s) Oral four times a day PRN  pantoprazole   Suspension 40 milliGRAM(s) Oral daily  sevelamer hydrochloride 1600 milliGRAM(s) Oral three times a day with meals      Objective:  T(F): 102.7 (03-07-19 @ 05:00), Max: 102.9 (03-06-19 @ 11:01)  HR: 88 (03-07-19 @ 05:00) (80 - 96)  BP: --  RR: 31 (03-07-19 @ 05:00) (24 - 44)  SpO2: 99% (03-07-19 @ 05:00) (97% - 100%)    PHYSICAL EXAM:  Constitutional:Well-developed, well nourished  ET tube 	  Neck:no JVD, no lymphadenopathy, supple  Respiratory: CTA ami  Cardiovascular: S1S2 RRR  Gastrointestinal:soft, (+) BS, no HSM  Extremities:no phlebitis . R  femoral HD catheter         LABS:                        13.7   12.32 )-----------( 100      ( 06 Mar 2019 06:19 )             43.6     03-06    139  |  102  |  40<H>  ----------------------------<  148<H>  3.9   |  25  |  6.8<HH>    Ca    10.4<H>      06 Mar 2019 06:19  Phos  4.5     03-06  Mg     2.1     03-06    TPro  5.5<L>  /  Alb  2.9<L>  /  TBili  0.6  /  DBili  x   /  AST  12  /  ALT  6   /  AlkPhos  84  03-06            MICROBIOLOGY:    Culture - Body Fluid with Gram Stain (collected 03-05-19 @ 13:48)  Source: Pleural Fl Pleural fluid  Gram Stain (03-06-19 @ 03:57):    No polymorphonuclear cells seen    No organisms seen  Preliminary Report (03-06-19 @ 21:03):    No growth    Culture - Blood (collected 03-05-19 @ 10:00)  Source: .Blood Blood-Catheter  Preliminary Report (03-06-19 @ 23:01):    No growth to date.    Culture - Blood (collected 03-05-19 @ 10:00)  Source: .Blood Blood  Preliminary Report (03-06-19 @ 23:01):    No growth to date.    Culture - Blood (collected 03-03-19 @ 06:06)  Source: .Blood None  Preliminary Report (03-04-19 @ 18:01):    No growth to date.    Culture - Blood (collected 03-02-19 @ 08:46)  Source: .Blood None  Preliminary Report (03-03-19 @ 20:01):    No growth to date.        Culture - Body Fluid with Gram Stain (collected 05 Mar 2019 13:48)  Source: Pleural Fl Pleural fluid  Gram Stain (06 Mar 2019 03:57):    No polymorphonuclear cells seen    No organisms seen  Preliminary Report (06 Mar 2019 21:03):    No growth    Culture - Blood (collected 05 Mar 2019 10:00)  Source: .Blood Blood-Catheter  Preliminary Report (06 Mar 2019 23:01):    No growth to date.    Culture - Blood (collected 05 Mar 2019 10:00)  Source: .Blood Blood  Preliminary Report (06 Mar 2019 23:01):    No growth to date.    Culture - Nose (collected 04 Mar 2019 20:21)  Source: .Nose Nose  Preliminary Report (06 Mar 2019 11:46):    Culture in progress      Culture Results:   No growth (03-05 @ 13:48)  Culture Results:   No growth to date. (03-05 @ 10:00)  Culture Results:   No growth to date. (03-05 @ 10:00)  Culture Results:   Culture in progress (03-04 @ 20:21)  Culture Results:   No growth to date. (03-03 @ 06:06)  Culture Results:   No growth to date. (03-02 @ 08:46)        RADIOLOGY & ADDITIONAL STUDIES:

## 2019-03-07 NOTE — PROGRESS NOTE ADULT - SUBJECTIVE AND OBJECTIVE BOX
Patient is a 63y old  Male who presents with a chief complaint of Respiratory failure / septic shock (06 Mar 2019 13:53)        Over Night Events: Remains febrile. Intubated       ROS:  See HPI    PHYSICAL EXAM    ICU Vital Signs Last 24 Hrs  T(C): 39.2 (07 Mar 2019 07:01), Max: 39.4 (06 Mar 2019 11:01)  T(F): 102.6 (07 Mar 2019 07:01), Max: 102.9 (06 Mar 2019 11:01)  HR: 95 (07 Mar 2019 08:59) (82 - 119)  ABP: 103/66 (07 Mar 2019 08:59) (91/57 - 114/77)  ABP(mean): 79 (07 Mar 2019 08:59) (69 - 91)  RR: 24 (07 Mar 2019 08:59) (24 - 44)  SpO2: 98% (07 Mar 2019 08:59) (97% - 100%)    General: Intubated and sedated  HEENT: MARTINE  Lymphatic system: No Cervical LN    Respiratory: [place] BS decreased  Cardiovascular: Regular  Gastrointestinal: Soft. + BS  Musculoskeletal: No clubbing. No edema  Skin: Warm.  Intact  Neurological: Sedated      03-06-19 @ 07:01  -  03-07-19 @ 07:00  --------------------------------------------------------  IN:    Enteral Tube Flush: 60 mL    Nepro: 1080 mL    norepinephrine Infusion: 864 mL    Solution: 200 mL  Total IN: 2204 mL    OUT:  Total OUT: 0 mL    Total NET: 2204 mL      03-07-19 @ 07:01  -  03-07-19 @ 09:30  --------------------------------------------------------  IN:    Enteral Tube Flush: 60 mL    Nepro: 90 mL    norepinephrine Infusion: 72 mL  Total IN: 222 mL    OUT:  Total OUT: 0 mL    Total NET: 222 mL      LABS:                            13.3   9.86  )-----------( 92       ( 07 Mar 2019 06:40 )             42.8                                               03-07    142  |  104  |  58<H>  ----------------------------<  142<H>  4.3   |  26  |  7.3<HH>    Ca    10.7<H>      07 Mar 2019 06:40  Phos  4.3     03-07  Mg     2.2     03-07    TPro  5.6<L>  /  Alb  2.8<L>  /  TBili  0.6  /  DBili  x   /  AST  13  /  ALT  6   /  AlkPhos  82  03-07                                                                 LIVER FUNCTIONS - ( 07 Mar 2019 06:40 )  Alb: 2.8 g/dL / Pro: 5.6 g/dL / ALK PHOS: 82 U/L / ALT: 6 U/L / AST: 13 U/L / GGT: x                                                  Culture - Body Fluid with Gram Stain (collected 05 Mar 2019 13:48)  Source: Pleural Fl Pleural fluid  Gram Stain (06 Mar 2019 03:57):    No polymorphonuclear cells seen    No organisms seen  Preliminary Report (06 Mar 2019 21:03):    No growth    Culture - Blood (collected 05 Mar 2019 10:00)  Source: .Blood Blood-Catheter  Preliminary Report (06 Mar 2019 23:01):    No growth to date.    Culture - Blood (collected 05 Mar 2019 10:00)  Source: .Blood Blood  Preliminary Report (06 Mar 2019 23:01):    No growth to date.    Culture - Nose (collected 04 Mar 2019 20:21)  Source: .Nose Nose  Preliminary Report (06 Mar 2019 11:46):    Culture in progress                                                   Mode: Auto Mode: PRVC/ Volume Support  RR (machine): 24  TV (machine): 500  FiO2: 40  PEEP: 5  MAP: 11  PIP: 24                                      ABG - ( 07 Mar 2019 05:12 )  pH, Arterial: 7.36  pH, Blood: x     /  pCO2: 44    /  pO2: 131   / HCO3: 25    / Base Excess: -0.9  /  SaO2: 99          MEDICATIONS  (STANDING):  calcium carbonate    500 mG (Tums) Chewable 1 Tablet(s) Chew three times a day  chlorhexidine 0.12% Liquid 15 milliLiter(s) Oral Mucosa two times a day  chlorhexidine 4% Liquid 1 Application(s) Topical <User Schedule>  ciprofloxacin   IVPB      ciprofloxacin   IVPB 400 milliGRAM(s) IV Intermittent every 24 hours  heparin  Injectable 5000 Unit(s) SubCutaneous every 12 hours  midazolam Infusion 0.02 mG/kG/Hr (1.992 mL/Hr) IV Continuous <Continuous>  midodrine 10 milliGRAM(s) Oral three times a day  norepinephrine Infusion 0.05 MICROgram(s)/kG/Min (4.669 mL/Hr) IV Continuous <Continuous>  pantoprazole   Suspension 40 milliGRAM(s) Oral daily  sevelamer hydrochloride 1600 milliGRAM(s) Oral three times a day with meals  vancomycin  IVPB        MEDICATIONS  (PRN):  acetaminophen    Suspension .. 650 milliGRAM(s) Oral every 6 hours PRN Temp greater or equal to 38C (100.4F)  oxyCODONE    IR 15 milliGRAM(s) Oral four times a day PRN Moderate Pain (4 - 6

## 2019-03-07 NOTE — PROGRESS NOTE ADULT - SUBJECTIVE AND OBJECTIVE BOX
Nephrology progress note    Patient is seen and examined, events over the last 24 h noted .    Allergies:  Gluten (Unknown)  naproxen (Other)  penicillin (Rash)    Hospital Medications:   MEDICATIONS  (STANDING):  calcium carbonate    500 mG (Tums) Chewable 1 Tablet(s) Chew three times a day  chlorhexidine 0.12% Liquid 15 milliLiter(s) Oral Mucosa two times a day  chlorhexidine 4% Liquid 1 Application(s) Topical <User Schedule>  ciprofloxacin   IVPB      ciprofloxacin   IVPB 400 milliGRAM(s) IV Intermittent every 24 hours  heparin  Injectable 5000 Unit(s) SubCutaneous every 12 hours  midodrine 10 milliGRAM(s) Oral three times a day  norepinephrine Infusion 0.05 MICROgram(s)/kG/Min (4.669 mL/Hr) IV Continuous <Continuous>  pantoprazole   Suspension 40 milliGRAM(s) Oral daily  sevelamer hydrochloride 1600 milliGRAM(s) Oral three times a day with meals  vancomycin  IVPB            VITALS:  T(F): 102.6 (03-07-19 @ 07:01), Max: 102.9 (03-06-19 @ 11:01)  HR: 95 (03-07-19 @ 08:59)  BP: --  RR: 24 (03-07-19 @ 08:59)  SpO2: 98% (03-07-19 @ 08:59)  Wt(kg): --    03-05 @ 07:01  -  03-06 @ 07:00  --------------------------------------------------------  IN: 2116.8 mL / OUT: 1000 mL / NET: 1116.8 mL    03-06 @ 07:01  -  03-07 @ 07:00  --------------------------------------------------------  IN: 2204 mL / OUT: 0 mL / NET: 2204 mL    03-07 @ 07:01  -  03-07 @ 10:19  --------------------------------------------------------  IN: 222 mL / OUT: 0 mL / NET: 222 mL          PHYSICAL EXAM:  Constitutional: NAD on vent  HEENT: anicteric sclera,  MMM  Neck: No JVD  Respiratory: CTAB  Cardiovascular: S1, S2, RRR 3/6 HSM  Gastrointestinal: BS+, soft, NT/ND  Extremities:  No peripheral edema  Neurological: Unrsponsive  :  No rosenberg.   Skin: No rashes  Vascular Access: [facility] Dago cath    LABS:  03-07    142  |  104  |  58<H>  ----------------------------<  142<H>  4.3   |  26  |  7.3<HH>    Ca    10.7<H>      07 Mar 2019 06:40  Phos  4.3     03-07  Mg     2.2     03-07    TPro  5.6<L>  /  Alb  2.8<L>  /  TBili  0.6  /  DBili      /  AST  13  /  ALT  6   /  AlkPhos  82  03-07                          13.3   9.86  )-----------( 92       ( 07 Mar 2019 06:40 )             42.8       Urine Studies:      RADIOLOGY & ADDITIONAL STUDIES:  < from: Xray Chest 1 View- PORTABLE-Routine (03.07.19 @ 05:47) >  Stable bilateral pleural effusion/opacities.    Stable support devices.    < end of copied text > Nephrology progress note    Patient is seen and examined, events over the last 24 h noted .    Allergies:  Gluten (Unknown)  naproxen (Other)  penicillin (Rash)    Hospital Medications:   MEDICATIONS  (STANDING):  calcium carbonate    500 mG (Tums) Chewable 1 Tablet(s) Chew three times a day  chlorhexidine 0.12% Liquid 15 milliLiter(s) Oral Mucosa two times a day  chlorhexidine 4% Liquid 1 Application(s) Topical <User Schedule>  ciprofloxacin   IVPB      ciprofloxacin   IVPB 400 milliGRAM(s) IV Intermittent every 24 hours  heparin  Injectable 5000 Unit(s) SubCutaneous every 12 hours  midodrine 10 milliGRAM(s) Oral three times a day  norepinephrine Infusion 0.05 MICROgram(s)/kG/Min (4.669 mL/Hr) IV Continuous <Continuous>  pantoprazole   Suspension 40 milliGRAM(s) Oral daily  sevelamer hydrochloride 1600 milliGRAM(s) Oral three times a day with meals  vancomycin  IVPB            VITALS:  T(F): 102.6 (03-07-19 @ 07:01), Max: 102.9 (03-06-19 @ 11:01)  HR: 95 (03-07-19 @ 08:59)  BP: 103/66  RR: 24 (03-07-19 @ 08:59)  SpO2: 98% (03-07-19 @ 08:59)  Wt(kg): --    03-05 @ 07:01  -  03-06 @ 07:00  --------------------------------------------------------  IN: 2116.8 mL / OUT: 1000 mL / NET: 1116.8 mL    03-06 @ 07:01  -  03-07 @ 07:00  --------------------------------------------------------  IN: 2204 mL / OUT: 0 mL / NET: 2204 mL    03-07 @ 07:01  -  03-07 @ 10:19  --------------------------------------------------------  IN: 222 mL / OUT: 0 mL / NET: 222 mL          PHYSICAL EXAM:  Constitutional: NAD on vent  HEENT: anicteric sclera,  MMM  Neck: No JVD  Respiratory: CTAB  Cardiovascular: S1, S2, RRR 3/6 HSM  Gastrointestinal: BS+, soft, NT/ND  Extremities:  No peripheral edema  Neurological: Unrsponsive  :  No rosenberg.   Skin: No rashes  Vascular Access: [facility] cath    LABS:  03-07    142  |  104  |  58<H>  ----------------------------<  142<H>  4.3   |  26  |  7.3<HH>    Ca    10.7<H>      07 Mar 2019 06:40  Phos  4.3     03-07  Mg     2.2     03-07    TPro  5.6<L>  /  Alb  2.8<L>  /  TBili  0.6  /  DBili      /  AST  13  /  ALT  6   /  AlkPhos  82  03-07                          13.3   9.86  )-----------( 92       ( 07 Mar 2019 06:40 )             42.8       Urine Studies:      RADIOLOGY & ADDITIONAL STUDIES:  < from: Xray Chest 1 View- PORTABLE-Routine (03.07.19 @ 05:47) >  Stable bilateral pleural effusion/opacities.    Stable support devices.    < end of copied text >

## 2019-03-08 LAB
ALBUMIN SERPL ELPH-MCNC: 2.5 G/DL — LOW (ref 3.5–5.2)
ALP SERPL-CCNC: 85 U/L — SIGNIFICANT CHANGE UP (ref 30–115)
ALT FLD-CCNC: 7 U/L — SIGNIFICANT CHANGE UP (ref 0–41)
ANION GAP SERPL CALC-SCNC: 10 MMOL/L — SIGNIFICANT CHANGE UP (ref 7–14)
AST SERPL-CCNC: 12 U/L — SIGNIFICANT CHANGE UP (ref 0–41)
BASE EXCESS BLDA CALC-SCNC: 4.1 MMOL/L — HIGH (ref -2–2)
BASOPHILS # BLD AUTO: 0.03 K/UL — SIGNIFICANT CHANGE UP (ref 0–0.2)
BASOPHILS NFR BLD AUTO: 0.3 % — SIGNIFICANT CHANGE UP (ref 0–1)
BILIRUB SERPL-MCNC: 0.7 MG/DL — SIGNIFICANT CHANGE UP (ref 0.2–1.2)
BUN SERPL-MCNC: 49 MG/DL — HIGH (ref 10–20)
CALCIUM SERPL-MCNC: 10 MG/DL — SIGNIFICANT CHANGE UP (ref 8.5–10.1)
CHLORIDE SERPL-SCNC: 104 MMOL/L — SIGNIFICANT CHANGE UP (ref 98–110)
CO2 SERPL-SCNC: 28 MMOL/L — SIGNIFICANT CHANGE UP (ref 17–32)
CREAT SERPL-MCNC: 5.8 MG/DL — CRITICAL HIGH (ref 0.7–1.5)
CULTURE RESULTS: SIGNIFICANT CHANGE UP
EOSINOPHIL # BLD AUTO: 0.06 K/UL — SIGNIFICANT CHANGE UP (ref 0–0.7)
EOSINOPHIL NFR BLD AUTO: 0.6 % — SIGNIFICANT CHANGE UP (ref 0–8)
GLUCOSE SERPL-MCNC: 137 MG/DL — HIGH (ref 70–99)
HCO3 BLDA-SCNC: 28 MMOL/L — HIGH (ref 23–27)
HCT VFR BLD CALC: 41.1 % — LOW (ref 42–52)
HGB BLD-MCNC: 13 G/DL — LOW (ref 14–18)
HOROWITZ INDEX BLDA+IHG-RTO: 40 — SIGNIFICANT CHANGE UP
IMM GRANULOCYTES NFR BLD AUTO: 0.3 % — SIGNIFICANT CHANGE UP (ref 0.1–0.3)
LYMPHOCYTES # BLD AUTO: 1.04 K/UL — LOW (ref 1.2–3.4)
LYMPHOCYTES # BLD AUTO: 10.8 % — LOW (ref 20.5–51.1)
MAGNESIUM SERPL-MCNC: 2.1 MG/DL — SIGNIFICANT CHANGE UP (ref 1.8–2.4)
MCHC RBC-ENTMCNC: 31.3 PG — HIGH (ref 27–31)
MCHC RBC-ENTMCNC: 31.6 G/DL — LOW (ref 32–37)
MCV RBC AUTO: 98.8 FL — HIGH (ref 80–94)
MONOCYTES # BLD AUTO: 1.32 K/UL — HIGH (ref 0.1–0.6)
MONOCYTES NFR BLD AUTO: 13.8 % — HIGH (ref 1.7–9.3)
NEUTROPHILS # BLD AUTO: 7.11 K/UL — HIGH (ref 1.4–6.5)
NEUTROPHILS NFR BLD AUTO: 74.2 % — SIGNIFICANT CHANGE UP (ref 42.2–75.2)
NRBC # BLD: 0 /100 WBCS — SIGNIFICANT CHANGE UP (ref 0–0)
PCO2 BLDA: 41 MMHG — SIGNIFICANT CHANGE UP (ref 38–42)
PH BLDA: 7.45 — HIGH (ref 7.38–7.42)
PHOSPHATE SERPL-MCNC: 3.4 MG/DL — SIGNIFICANT CHANGE UP (ref 2.1–4.9)
PLATELET # BLD AUTO: 99 K/UL — LOW (ref 130–400)
PO2 BLDA: 112 MMHG — HIGH (ref 78–95)
POTASSIUM SERPL-MCNC: 4.3 MMOL/L — SIGNIFICANT CHANGE UP (ref 3.5–5)
POTASSIUM SERPL-SCNC: 4.3 MMOL/L — SIGNIFICANT CHANGE UP (ref 3.5–5)
PROT SERPL-MCNC: 5.2 G/DL — LOW (ref 6–8)
RBC # BLD: 4.16 M/UL — LOW (ref 4.7–6.1)
RBC # FLD: 15.7 % — HIGH (ref 11.5–14.5)
SAO2 % BLDA: 99 % — HIGH (ref 94–98)
SODIUM SERPL-SCNC: 142 MMOL/L — SIGNIFICANT CHANGE UP (ref 135–146)
SPECIMEN SOURCE: SIGNIFICANT CHANGE UP
TYPE + AB SCN PNL BLD: SIGNIFICANT CHANGE UP
WBC # BLD: 9.59 K/UL — SIGNIFICANT CHANGE UP (ref 4.8–10.8)
WBC # FLD AUTO: 9.59 K/UL — SIGNIFICANT CHANGE UP (ref 4.8–10.8)

## 2019-03-08 RX ADMIN — SEVELAMER CARBONATE 1600 MILLIGRAM(S): 2400 POWDER, FOR SUSPENSION ORAL at 08:04

## 2019-03-08 RX ADMIN — Medication 4.67 MICROGRAM(S)/KG/MIN: at 00:00

## 2019-03-08 RX ADMIN — Medication 4.67 MICROGRAM(S)/KG/MIN: at 19:36

## 2019-03-08 RX ADMIN — Medication 650 MILLIGRAM(S): at 05:21

## 2019-03-08 RX ADMIN — CHLORHEXIDINE GLUCONATE 1 APPLICATION(S): 213 SOLUTION TOPICAL at 05:23

## 2019-03-08 RX ADMIN — HEPARIN SODIUM 5000 UNIT(S): 5000 INJECTION INTRAVENOUS; SUBCUTANEOUS at 05:20

## 2019-03-08 RX ADMIN — Medication 650 MILLIGRAM(S): at 18:35

## 2019-03-08 RX ADMIN — Medication 200 MILLIGRAM(S): at 18:06

## 2019-03-08 RX ADMIN — Medication 1 TABLET(S): at 18:00

## 2019-03-08 RX ADMIN — MIDODRINE HYDROCHLORIDE 10 MILLIGRAM(S): 2.5 TABLET ORAL at 21:21

## 2019-03-08 RX ADMIN — CHLORHEXIDINE GLUCONATE 15 MILLILITER(S): 213 SOLUTION TOPICAL at 05:20

## 2019-03-08 RX ADMIN — Medication 1 TABLET(S): at 21:21

## 2019-03-08 RX ADMIN — MIDODRINE HYDROCHLORIDE 10 MILLIGRAM(S): 2.5 TABLET ORAL at 18:03

## 2019-03-08 RX ADMIN — SEVELAMER CARBONATE 1600 MILLIGRAM(S): 2400 POWDER, FOR SUSPENSION ORAL at 18:02

## 2019-03-08 RX ADMIN — MIDODRINE HYDROCHLORIDE 10 MILLIGRAM(S): 2.5 TABLET ORAL at 05:20

## 2019-03-08 RX ADMIN — Medication 1 TABLET(S): at 05:24

## 2019-03-08 RX ADMIN — HEPARIN SODIUM 5000 UNIT(S): 5000 INJECTION INTRAVENOUS; SUBCUTANEOUS at 18:03

## 2019-03-08 RX ADMIN — Medication 650 MILLIGRAM(S): at 07:15

## 2019-03-08 RX ADMIN — CHLORHEXIDINE GLUCONATE 15 MILLILITER(S): 213 SOLUTION TOPICAL at 18:03

## 2019-03-08 RX ADMIN — Medication 4.67 MICROGRAM(S)/KG/MIN: at 07:04

## 2019-03-08 RX ADMIN — PANTOPRAZOLE SODIUM 40 MILLIGRAM(S): 20 TABLET, DELAYED RELEASE ORAL at 18:01

## 2019-03-08 NOTE — PROGRESS NOTE ADULT - SUBJECTIVE AND OBJECTIVE BOX
Patient is a 63y old  Male who presents with a chief complaint of Respiratory failure / septic shock (07 Mar 2019 14:01)      T(F): 103.3 (03-08-19 @ 07:10), Max: 103.3 (03-08-19 @ 06:30)  HR: 109 (03-08-19 @ 07:10)  BP: 94/60 (03-07-19 @ 11:06)  RR: 24 (03-08-19 @ 07:10)  SpO2: 99% (03-08-19 @ 06:30) (97% - 100%)    PHYSICAL EXAM:  GENERAL: NAD, well-groomed, well-developed  HEAD:  Atraumatic, Normocephalic  EYES: EOMI, PERRLA, conjunctiva and sclera clear  ENMT: No tonsillar erythema, exudates, or enlargement; Moist mucous membranes, Good dentition, No lesions  NECK: Supple, No JVD, Normal thyroid  NERVOUS SYSTEM:  Alert & Oriented X3,  Motor Strength 5/5 B/L upper and lower extremities  CHEST/LUNG: Clear to percussion bilaterally; No rales, rhonchi, wheezing, or rubs  HEART: Irreg. No murmurs, rubs, or gallops  ABDOMEN: Soft, Nontender, Nondistended; Bowel sounds present  EXTREMITIES:   No clubbing, cyanosis,tr edema  LYMPH: No lymphadenopathy noted  SKIN: No rashes or lesions    labs  03-07    142  |  104  |  58<H>  ----------------------------<  142<H>  4.3   |  26  |  7.3<HH>    Ca    10.7<H>      07 Mar 2019 06:40  Phos  4.3     03-07  Mg     2.2     03-07    TPro  5.6<L>  /  Alb  2.8<L>  /  TBili  0.6  /  DBili  x   /  AST  13  /  ALT  6   /  AlkPhos  82  03-07                          13.3   9.86  )-----------( 92       ( 07 Mar 2019 06:40 )             42.8       Culture - Body Fluid with Gram Stain (collected 05 Mar 2019 13:48)  Source: Pleural Fl Pleural fluid  Gram Stain (06 Mar 2019 03:57):    No polymorphonuclear cells seen    No organisms seen  Preliminary Report (06 Mar 2019 21:03):    No growth    Culture - Blood (collected 05 Mar 2019 10:00)  Source: .Blood Blood-Catheter  Preliminary Report (06 Mar 2019 23:01):    No growth to date.    Culture - Blood (collected 05 Mar 2019 10:00)  Source: .Blood Blood  Preliminary Report (06 Mar 2019 23:01):    No growth to date.        Mode: Auto Mode: PRVC/ Volume Support  RR (machine): 24  TV (machine): 500  FiO2: 35  PEEP: 5  MAP: 11  PIP: 24        acetaminophen    Suspension .. 650 milliGRAM(s) Oral every 6 hours PRN  calcium carbonate    500 mG (Tums) Chewable 1 Tablet(s) Chew three times a day  chlorhexidine 0.12% Liquid 15 milliLiter(s) Oral Mucosa two times a day  chlorhexidine 4% Liquid 1 Application(s) Topical <User Schedule>  ciprofloxacin   IVPB      ciprofloxacin   IVPB 400 milliGRAM(s) IV Intermittent every 24 hours  heparin  Injectable 5000 Unit(s) SubCutaneous every 12 hours  midodrine 10 milliGRAM(s) Oral three times a day  norepinephrine Infusion 0.05 MICROgram(s)/kG/Min IV Continuous <Continuous>  oxyCODONE    IR 15 milliGRAM(s) Oral four times a day PRN  pantoprazole   Suspension 40 milliGRAM(s) Oral daily  sevelamer hydrochloride 1600 milliGRAM(s) Oral three times a day with meals  .

## 2019-03-08 NOTE — PROGRESS NOTE ADULT - ASSESSMENT
Patient is a 63y old  Male who presents with a chief complaint of shortness of breath; developed acute hypercapnic respiratory failure and was intubated on 3/3/2019 in setting of septic shock.    # Septic shock  - Unclear source  - Still having fevers with Tmax of 103.3 degrees F  - Patient still requiring pressors: will try to wean off  - WBC 9.59 today  - Patient was being treated for infected AVF (pseudomonas) prior to presentation and completed course of antibiotics: sensitive to cefepime, see results in chart  - No vegetations seen on 2D echo  - Blood cultures negative to date  - RVP, MRSA negative  - Gallium scan with no significant results (see report above)    - No need for exploration as per surgery; ultrasound showed no abscess  - Sent blood cultures from right femoral Dago cath as per nephrology: no growth to date  - F/U with ID: fevers might be secondary to medications; will discontinue Vancomycin. Continue Ciprofloxacin 400mg IV q24h for now (previous pseudomonas infection was sensitive to ciprofloxacin)  - Will probably remove right femoral dago cath after hemodialysis today  - Monitor VS; arterial line placed on 3/4/2019    # Acute hypercapnic respiratory failure  - Remains intubated  - CXR showing bilateral interstitial opacities  - Thoracentesis done on 3/5/2019, 1.5L removed: pH 7.38, glucose 101; Gram stain negative; transudative pleural effusion  - Last hemodialysis done on 3/7/2019; will get HD today (3/8/2019)  - RVP negative    # ESRD  - On HD: last done on 3/7/2019; to be done again today (3/8/2019)  - Monitor electrolytes daily  - Patient has been on Midazolam infusion for sedation that was discontinued on 3/5/2019; patient's current mental status can be explained by the fact the Midazolam is not dialyzable and that it effect will probably last for a few days   - F/U with nephrology    # Hypercalcemia  - Stable  - F/U with nephrology    # DVT prophylaxis: heparin S/Q  # Tube feeds; GI prophylaxis Patient is a 63y old  Male who presents with a chief complaint of shortness of breath; developed acute hypercapnic respiratory failure and was intubated on 3/3/2019 in setting of septic shock.    # Septic shock  - Unclear source  - Still having fevers with Tmax of 103.3 degrees F  - Patient still requiring pressors: will try to wean off  - WBC 9.59 today  - Patient was being treated for infected AVF (pseudomonas) prior to presentation and completed course of antibiotics: sensitive to cefepime, see results in chart  - No vegetations seen on 2D echo  - Blood cultures negative to date  - RVP, MRSA negative  - Gallium scan with no significant results (see report above)    - No need for exploration of left AVF as per surgery; ultrasound showed no abscess  - Sent blood cultures from right femoral Dago cath as per nephrology: no growth to date  - F/U with ID: fevers might be secondary to medications; will discontinue Vancomycin. Continue Ciprofloxacin 400mg IV q24h for now (previous pseudomonas infection was sensitive to ciprofloxacin)  - Will probably remove right femoral dago cath after hemodialysis today  - Monitor VS; arterial line placed on 3/4/2019    # Acute hypercapnic respiratory failure  - Remains intubated  - CXR showing bilateral interstitial opacities  - Thoracentesis done on 3/5/2019, 1.5L removed: pH 7.38, glucose 101; Gram stain negative; transudative pleural effusion  - Last hemodialysis done on 3/7/2019; will get HD today (3/8/2019)  - RVP negative    # ESRD  - On HD: last done on 3/7/2019; to be done again today (3/8/2019)  - Monitor electrolytes daily  - Patient has been on Midazolam infusion for sedation that was discontinued on 3/5/2019; patient's current mental status can be explained by the fact the Midazolam is not dialyzable and that it effect will probably last for a few days   - F/U with nephrology    # Hypercalcemia  - Stable  - F/U with nephrology    # DVT prophylaxis: heparin S/Q  # Tube feeds; GI prophylaxis

## 2019-03-08 NOTE — PROGRESS NOTE ADULT - ASSESSMENT
IMPRESSION:  Septic shock - still on pressors  ESRD  history of graft infection pseudomonas  pulmonary edema volume over load     PLAN:    CNS: Keep off sedation    HEENT:  oral care    PULMONARY: keep pox > 92 %. No vent changes  f/u thora results    CARDIOVASCULAR:  Taper pressors  Keep MAP >65  cardiology follow up    GI: GI prophylaxis.  Feeding     RENAL: F/U renal. F/U lytes    INFECTIOUS DISEASE: D/C vanco  follow cx for now   Will D/C udall post HD    HEMATOLOGICAL:  DVT prophylaxis.    ENDOCRINE:  Follow up FS.  Insulin protocol if needed.    MUSCULOSKELETAL: Bedrest    cont. ICU Monitoring

## 2019-03-08 NOTE — PROGRESS NOTE ADULT - ASSESSMENT
Patient intubated off sedation on pressors. Check cultures labs. Echo no vegetation . Check cbc. Sed rate low 9. Continue support. Still febrile.  Check Gallium. Prognosis guarded

## 2019-03-08 NOTE — PROGRESS NOTE ADULT - SUBJECTIVE AND OBJECTIVE BOX
Nephrology progress note    Patient is seen and examined, events over the last 24 h noted .  Pt remains intubated, on pressors, still spiking temp to 103  Allergies:  Gluten (Unknown)  naproxen (Other)  penicillin (Rash)    Hospital Medications:   MEDICATIONS  (STANDING):  calcium carbonate    500 mG (Tums) Chewable 1 Tablet(s) Chew three times a day  chlorhexidine 0.12% Liquid 15 milliLiter(s) Oral Mucosa two times a day  chlorhexidine 4% Liquid 1 Application(s) Topical <User Schedule>  ciprofloxacin   IVPB      ciprofloxacin   IVPB 400 milliGRAM(s) IV Intermittent every 24 hours  heparin  Injectable 5000 Unit(s) SubCutaneous every 12 hours  midodrine 10 milliGRAM(s) Oral three times a day  norepinephrine Infusion 0.05 MICROgram(s)/kG/Min (4.669 mL/Hr) IV Continuous <Continuous>  pantoprazole   Suspension 40 milliGRAM(s) Oral daily  sevelamer hydrochloride 1600 milliGRAM(s) Oral three times a day with meals        VITALS:  T(F): 103.3 (03-08-19 @ 07:10), Max: 103.3 (03-08-19 @ 06:30)  HR: 97 (03-08-19 @ 07:43)  BP: 94/60 (03-07-19 @ 11:06)  RR: 24 (03-08-19 @ 07:10)  SpO2: 97% (03-08-19 @ 07:43)  Wt(kg): --    03-06 @ 07:01  -  03-07 @ 07:00  --------------------------------------------------------  IN: 2204 mL / OUT: 0 mL / NET: 2204 mL    03-07 @ 07:01  -  03-08 @ 07:00  --------------------------------------------------------  IN: 2506 mL / OUT: 1000 mL / NET: 1506 mL          PHYSICAL EXAM:  Constitutional: On vent  Respiratory: decreased BS b/l  Cardiovascular: S1, S2, RRR  Gastrointestinal: BS+, soft, NT/ND  Extremities: No peripheral edema  Neurological: unresponsive  : rosenberg+.   Skin: No rashes  Vascular Access: Rt fem Dago Rt AVF    LABS:  03-08    142  |  104  |  49<H>  ----------------------------<  137<H>  4.3   |  28  |  5.8<HH>    Ca    10.0      08 Mar 2019 05:44  Phos  3.4     03-08  Mg     2.1     03-08    TPro  5.2<L>  /  Alb  2.5<L>  /  TBili  0.7  /  DBili      /  AST  12  /  ALT  7   /  AlkPhos  85  03-08                          13.0   9.59  )-----------( 99       ( 08 Mar 2019 05:44 )             41.1       Urine Studies:      RADIOLOGY & ADDITIONAL STUDIES:

## 2019-03-08 NOTE — CONSULT NOTE ADULT - CONSULT REQUESTED DATE/TIME
02-Mar-2019 07:14
02-Mar-2019 08:14
02-Mar-2019 09:14
02-Mar-2019 09:48
02-Mar-2019 12:38
04-Mar-2019 22:56
08-Mar-2019 09:43
08-Mar-2019 17:11
03-Mar-2019 00:00

## 2019-03-08 NOTE — PROGRESS NOTE ADULT - PROBLEM SELECTOR PLAN 1
Change Cefepime to 1 G Q 24 - post HD on HD days   Follow blood cx  ? recurrent AV fistula infection    prior hx of pseudomonas
No positive blood cx  Normal ESR   wbc down    Gallium scan - neg    1. DC all non essential meds  2. DC Vanco - NO MRSA on cx and Neg PCR - need to r/o drug fevers  3. DC Femoral lines  4. Monitor after dcing femoral line and Vanco - if still febrile, will need to have a PRETTY done (despite normal ESR and neg blood cx)  5. Keep on IV Cipro for now   6. Repeat ESR   7. may need to consider non infectious fevers - TSH normal / sono left arm - thrombosis (old)
agree with IV Cipro and vanco for now   R/o beta lactam fevers     gallium scan - NO Clear focus of fevers  follow temps off Cefepime
b/l opacities on cxr  s/p HD yesterday  now on higher doses of pressors  possible sepsis  off antibiotics as per ID  check pan cultures  check stat abg and place central line  I discussed plan with Intensivist.
check repeat abg today  wean fio2 as tolerated  even fluid balance
continue with current vent settings  -s/p thoracentesis yesterday 1.5L
pleural effusion s/p thoracentesis   wean fio2 as tolerated  even fluid balance
pleural effusion s/p thoracentesis today   wean fio2 as tolerated  even fluid balance

## 2019-03-08 NOTE — PROGRESS NOTE ADULT - SUBJECTIVE AND OBJECTIVE BOX
ANNIE ARGUETA    Patient is a 63y old  Male who presents with a chief complaint of Respiratory failure / septic shock (07 Mar 2019 14:01)      Interval History/Overnight events: Patient still having fevers. No other acute events    T(C): 39.6 (03-08-19 @ 07:10), Max: 39.6 (03-07-19 @ 15:01)  HR: 97 (03-08-19 @ 07:43)  BP: 94/60 (03-07-19 @ 11:06)  RR: 24 (03-08-19 @ 07:10)  SpO2: 97% (03-08-19 @ 07:43)    PHYSICAL EXAM:  GENERAL: Patient intubated  CHEST/LUNG: Bilateral rhonchi heard  HEART: Regular rate and rhythm; systolic murmur heard on auscultation  ABDOMEN: Soft, Nontender, Nondistended  EXTREMITIES: Minimal LE bilateral edema; erythema around previous AVF   NEURO: patient off sedation, not responding to commands, however, mild reaction to painful stimuli      LABS:                          13.0   9.59  )-----------( 99       ( 08 Mar 2019 05:44 )             41.1     03-08    142  |  104  |  49<H>  ----------------------------<  137<H>  4.3   |  28  |  5.8<HH>    Ca    10.0      08 Mar 2019 05:44  Phos  3.4     03-08  Mg     2.1     03-08    TPro  5.2<L>  /  Alb  2.5<L>  /  TBili  0.7  /  DBili  x   /  AST  12  /  ALT  7   /  AlkPhos  85  03-08            Culture - Body Fluid with Gram Stain (collected 05 Mar 2019 13:48)  Source: Pleural Fl Pleural fluid  Gram Stain (06 Mar 2019 03:57):    No polymorphonuclear cells seen    No organisms seen  Preliminary Report (06 Mar 2019 21:03):    No growth    Culture - Blood (collected 05 Mar 2019 10:00)  Source: .Blood Blood-Catheter  Preliminary Report (06 Mar 2019 23:01):    No growth to date.    Culture - Blood (collected 05 Mar 2019 10:00)  Source: .Blood Blood  Preliminary Report (06 Mar 2019 23:01):    No growth to date.        LIVER FUNCTIONS - ( 08 Mar 2019 05:44 )  Alb: 2.5 g/dL / Pro: 5.2 g/dL / ALK PHOS: 85 U/L / ALT: 7 U/L / AST: 12 U/L / GGT: x             MEDICATIONS:    MEDICATIONS  (STANDING):  calcium carbonate    500 mG (Tums) Chewable 1 Tablet(s) Chew three times a day  chlorhexidine 0.12% Liquid 15 milliLiter(s) Oral Mucosa two times a day  chlorhexidine 4% Liquid 1 Application(s) Topical <User Schedule>  ciprofloxacin   IVPB      ciprofloxacin   IVPB 400 milliGRAM(s) IV Intermittent every 24 hours  heparin  Injectable 5000 Unit(s) SubCutaneous every 12 hours  midodrine 10 milliGRAM(s) Oral three times a day  norepinephrine Infusion 0.05 MICROgram(s)/kG/Min (4.669 mL/Hr) IV Continuous <Continuous>  pantoprazole   Suspension 40 milliGRAM(s) Oral daily  sevelamer hydrochloride 1600 milliGRAM(s) Oral three times a day with meals      MEDICATIONS  (PRN):  acetaminophen    Suspension .. 650 milliGRAM(s) Oral every 6 hours PRN Temp greater or equal to 38C (100.4F)  oxyCODONE    IR 15 milliGRAM(s) Oral four times a day PRN Moderate Pain (4 - 6) ANNIE ARGUETA    Patient is a 63y old  Male who presents with a chief complaint of Respiratory failure / septic shock (07 Mar 2019 14:01)      Interval History/Overnight events: Patient still having fevers. No other acute events    T(C): 39.6 (03-08-19 @ 07:10), Max: 39.6 (03-07-19 @ 15:01)  HR: 97 (03-08-19 @ 07:43)  BP: 94/60 (03-07-19 @ 11:06)  RR: 24 (03-08-19 @ 07:10)  SpO2: 97% (03-08-19 @ 07:43)    PHYSICAL EXAM:  GENERAL: Patient intubated  CHEST/LUNG: Bilateral rhonchi heard  HEART: Regular rate and rhythm; systolic murmur heard on auscultation  ABDOMEN: Soft, Nontender, Nondistended  EXTREMITIES: Minimal LE bilateral edema; erythema around previous AVF   NEURO: patient off sedation, not responding to commands, however, mild reaction to painful stimuli      LABS:                          13.0   9.59  )-----------( 99       ( 08 Mar 2019 05:44 )             41.1     03-08    142  |  104  |  49<H>  ----------------------------<  137<H>  4.3   |  28  |  5.8<HH>    Ca    10.0      08 Mar 2019 05:44  Phos  3.4     03-08  Mg     2.1     03-08    TPro  5.2<L>  /  Alb  2.5<L>  /  TBili  0.7  /  DBili  x   /  AST  12  /  ALT  7   /  AlkPhos  85  03-08      Culture - Body Fluid with Gram Stain (collected 05 Mar 2019 13:48)  Source: Pleural Fl Pleural fluid  Gram Stain (06 Mar 2019 03:57):    No polymorphonuclear cells seen    No organisms seen  Preliminary Report (06 Mar 2019 21:03):    No growth    Culture - Blood (collected 05 Mar 2019 10:00)  Source: .Blood Blood-Catheter  Preliminary Report (06 Mar 2019 23:01):    No growth to date.    Culture - Blood (collected 05 Mar 2019 10:00)  Source: .Blood Blood  Preliminary Report (06 Mar 2019 23:01):    No growth to date.      LIVER FUNCTIONS - ( 08 Mar 2019 05:44 )  Alb: 2.5 g/dL / Pro: 5.2 g/dL / ALK PHOS: 85 U/L / ALT: 7 U/L / AST: 12 U/L / GGT: x           < from: NM Inflammatory Loc Wholebody, Gallium (03.07.19 @ 14:42) >  Impression: No definite sites of abnormal gallium uptake. Specifically no abnormal gallium uptake in the left upper arm (region of AV fistulas) nor the right groin    Questionable 1+ intensity gallium uptake focally in the right upper arm laterally midway between the shoulder and elbow. Physical examination correlation is suggested. Right groin permacath may be another source now being used for dialysis    < end of copied text >      MEDICATIONS:    MEDICATIONS  (STANDING):  calcium carbonate    500 mG (Tums) Chewable 1 Tablet(s) Chew three times a day  chlorhexidine 0.12% Liquid 15 milliLiter(s) Oral Mucosa two times a day  chlorhexidine 4% Liquid 1 Application(s) Topical <User Schedule>  ciprofloxacin   IVPB      ciprofloxacin   IVPB 400 milliGRAM(s) IV Intermittent every 24 hours  heparin  Injectable 5000 Unit(s) SubCutaneous every 12 hours  midodrine 10 milliGRAM(s) Oral three times a day  norepinephrine Infusion 0.05 MICROgram(s)/kG/Min (4.669 mL/Hr) IV Continuous <Continuous>  pantoprazole   Suspension 40 milliGRAM(s) Oral daily  sevelamer hydrochloride 1600 milliGRAM(s) Oral three times a day with meals      MEDICATIONS  (PRN):  acetaminophen    Suspension .. 650 milliGRAM(s) Oral every 6 hours PRN Temp greater or equal to 38C (100.4F)  oxyCODONE    IR 15 milliGRAM(s) Oral four times a day PRN Moderate Pain (4 - 6)

## 2019-03-08 NOTE — CONSULT NOTE ADULT - ASSESSMENT
63M admitted to CCU currently intubated.     Patient currently recieving dialysis, will remove catheter post dialysis.  - Plan for udall/tessio placement at request of nephrology team

## 2019-03-08 NOTE — PROGRESS NOTE ADULT - SUBJECTIVE AND OBJECTIVE BOX
infectious diseases progress note:  ANNIE ARGUETA is a 63yMale patient    SOB (SHORTNESS OF BREATH);WEAKNESS;ELEVATED TROPONIN    Sepsis, due to unspecified organism  Sepsis  Acute respiratory failure  Foot dermatitis  ESRD (end stage renal disease)  Hypotension  Elevated troponin  SOB (shortness of breath)      ROS:  UTO     Allergies    Gluten (Unknown)  naproxen (Other)  penicillin (Rash)    Intolerances        ANTIBIOTICS/RELEVANT:  antimicrobials  ciprofloxacin   IVPB      ciprofloxacin   IVPB 400 milliGRAM(s) IV Intermittent every 24 hours  vancomycin  IVPB 1000 milliGRAM(s) IV Intermittent every 24 hours    immunologic:    OTHER:  acetaminophen    Suspension .. 650 milliGRAM(s) Oral every 6 hours PRN  calcium carbonate    500 mG (Tums) Chewable 1 Tablet(s) Chew three times a day  chlorhexidine 0.12% Liquid 15 milliLiter(s) Oral Mucosa two times a day  chlorhexidine 4% Liquid 1 Application(s) Topical <User Schedule>  heparin  Injectable 5000 Unit(s) SubCutaneous every 12 hours  midodrine 10 milliGRAM(s) Oral three times a day  norepinephrine Infusion 0.05 MICROgram(s)/kG/Min IV Continuous <Continuous>  oxyCODONE    IR 15 milliGRAM(s) Oral four times a day PRN  pantoprazole   Suspension 40 milliGRAM(s) Oral daily  sevelamer hydrochloride 1600 milliGRAM(s) Oral three times a day with meals      Objective:  T(F): 101.8 (03-08-19 @ 03:00), Max: 103.2 (03-07-19 @ 15:01)  HR: 96 (03-08-19 @ 03:00) (88 - 119)  BP: 94/60 (03-07-19 @ 11:06) (94/60 - 94/60)  RR: 80 (03-08-19 @ 03:00) (24 - 84)  SpO2: 100% (03-08-19 @ 03:00) (97% - 100%)    PHYSICAL EXAM:  Constitutional:Well-developed, well nourished  Vented	  Neck:no JVD, no lymphadenopathy, supple  Respiratory: CTA ami  Cardiovascular: S1S2 RRR, no murmurs  Gastrointestinal:soft, (+) BS, no HSM  Extremities: Bilateral groin lines        LABS:                        13.3   9.86  )-----------( 92       ( 07 Mar 2019 06:40 )             42.8     03-07    142  |  104  |  58<H>  ----------------------------<  142<H>  4.3   |  26  |  7.3<HH>    Ca    10.7<H>      07 Mar 2019 06:40  Phos  4.3     03-07  Mg     2.2     03-07    TPro  5.6<L>  /  Alb  2.8<L>  /  TBili  0.6  /  DBili  x   /  AST  13  /  ALT  6   /  AlkPhos  82  03-07            MICROBIOLOGY:    Culture - Body Fluid with Gram Stain (collected 03-05-19 @ 13:48)  Source: Pleural Fl Pleural fluid  Gram Stain (03-06-19 @ 03:57):    No polymorphonuclear cells seen    No organisms seen  Preliminary Report (03-06-19 @ 21:03):    No growth    Culture - Blood (collected 03-05-19 @ 10:00)  Source: .Blood Blood-Catheter  Preliminary Report (03-06-19 @ 23:01):    No growth to date.    Culture - Blood (collected 03-05-19 @ 10:00)  Source: .Blood Blood  Preliminary Report (03-06-19 @ 23:01):    No growth to date.    Culture - Blood (collected 03-03-19 @ 06:06)  Source: .Blood None  Preliminary Report (03-04-19 @ 18:01):    No growth to date.    Culture - Blood (collected 03-02-19 @ 08:46)  Source: .Blood None  Final Report (03-07-19 @ 20:00):    No growth at 5 days.        Culture - Body Fluid with Gram Stain (collected 05 Mar 2019 13:48)  Source: Pleural Fl Pleural fluid  Gram Stain (06 Mar 2019 03:57):    No polymorphonuclear cells seen    No organisms seen  Preliminary Report (06 Mar 2019 21:03):    No growth    Culture - Blood (collected 05 Mar 2019 10:00)  Source: .Blood Blood-Catheter  Preliminary Report (06 Mar 2019 23:01):    No growth to date.    Culture - Blood (collected 05 Mar 2019 10:00)  Source: .Blood Blood  Preliminary Report (06 Mar 2019 23:01):    No growth to date.      Culture Results:   No growth (03-05 @ 13:48)  Culture Results:   No growth to date. (03-05 @ 10:00)  Culture Results:   No growth to date. (03-05 @ 10:00)  Culture Results:   No MRSA isolated  No Staph Aureus (MSSA) isolated "This can represent normal nasal  carriage.  PCR is more sensitive for identifying MRSA/MSSA carriage" (03-04 @ 20:21)  Culture Results:   No growth to date. (03-03 @ 06:06)  Culture Results:   No growth at 5 days. (03-02 @ 08:46)        RADIOLOGY & ADDITIONAL STUDIES:

## 2019-03-08 NOTE — PROGRESS NOTE ADULT - SUBJECTIVE AND OBJECTIVE BOX
Patient is a 63y old  Male who presents with a chief complaint of SOB / respiratory failure (08 Mar 2019 08:30)      Over Night Events: Febrile. Remains intubated. On pressors    ROS:  See HPI    PHYSICAL EXAM    ICU Vital Signs Last 24 Hrs  T(C): 39.6 (08 Mar 2019 07:10), Max: 39.6 (07 Mar 2019 15:01)  T(F): 103.3 (08 Mar 2019 07:10), Max: 103.3 (08 Mar 2019 06:30)  HR: 97 (08 Mar 2019 07:43) (88 - 109)  BP: 94/60 (07 Mar 2019 11:06) (94/60 - 94/60)  ABP: 104/68 (08 Mar 2019 07:10) (89/55 - 132/82)  ABP(mean): 81 (08 Mar 2019 07:10) (68 - 100)  RR: 24 (08 Mar 2019 07:10) (24 - 95)  SpO2: 97% (08 Mar 2019 07:43) (97% - 100%)    General: Intubated and sedated  HEENT: MARTINE  Lymphatic system: No Cervical LN    Respiratory: [place] BS decreased  Cardiovascular: Regular  Gastrointestinal: Soft. + BS  Musculoskeletal: No clubbing.   Skin: Warm.  Intact  Neurological: Withdraws to pain. No purposeful movements      03-07-19 @ 07:01  -  03-08-19 @ 07:00  --------------------------------------------------------  IN:    Enteral Tube Flush: 60 mL    Nepro: 945 mL    norepinephrine Infusion: 1051 mL    Solution: 250 mL    Solution: 200 mL  Total IN: 2506 mL    OUT:    Other: 1000 mL  Total OUT: 1000 mL    Total NET: 1506 mL          LABS:                            13.0   9.59  )-----------( 99       ( 08 Mar 2019 05:44 )             41.1                                               03-08    142  |  104  |  49<H>  ----------------------------<  137<H>  4.3   |  28  |  5.8<HH>    Ca    10.0      08 Mar 2019 05:44  Phos  3.4     03-08  Mg     2.1     03-08    TPro  5.2<L>  /  Alb  2.5<L>  /  TBili  0.7  /  DBili  x   /  AST  12  /  ALT  7   /  AlkPhos  85  03-08                                                LIVER FUNCTIONS - ( 08 Mar 2019 05:44 )  Alb: 2.5 g/dL / Pro: 5.2 g/dL / ALK PHOS: 85 U/L / ALT: 7 U/L / AST: 12 U/L / GGT: x                                                  Culture - Body Fluid with Gram Stain (collected 05 Mar 2019 13:48)  Source: Pleural Fl Pleural fluid  Gram Stain (06 Mar 2019 03:57):    No polymorphonuclear cells seen    No organisms seen  Preliminary Report (06 Mar 2019 21:03):    No growth    Culture - Blood (collected 05 Mar 2019 10:00)  Source: .Blood Blood-Catheter  Preliminary Report (06 Mar 2019 23:01):    No growth to date.    Culture - Blood (collected 05 Mar 2019 10:00)  Source: .Blood Blood  Preliminary Report (06 Mar 2019 23:01):    No growth to date.                                                   Mode: Auto Mode: PRVC/ Volume Support  RR (machine): 24  TV (machine): 500  FiO2: 35  PEEP: 5  MAP: 12  PIP: 27                                      ABG - ( 08 Mar 2019 05:49 )  pH, Arterial: 7.45  pH, Blood: x     /  pCO2: 41    /  pO2: 112   / HCO3: 28    / Base Excess: 4.1   /  SaO2: 99          MEDICATIONS  (STANDING):  calcium carbonate    500 mG (Tums) Chewable 1 Tablet(s) Chew three times a day  chlorhexidine 0.12% Liquid 15 milliLiter(s) Oral Mucosa two times a day  chlorhexidine 4% Liquid 1 Application(s) Topical <User Schedule>  ciprofloxacin   IVPB      ciprofloxacin   IVPB 400 milliGRAM(s) IV Intermittent every 24 hours  heparin  Injectable 5000 Unit(s) SubCutaneous every 12 hours  midodrine 10 milliGRAM(s) Oral three times a day  norepinephrine Infusion 0.05 MICROgram(s)/kG/Min (4.669 mL/Hr) IV Continuous <Continuous>  pantoprazole   Suspension 40 milliGRAM(s) Oral daily  sevelamer hydrochloride 1600 milliGRAM(s) Oral three times a day with meals      MEDICATIONS  (PRN):  acetaminophen    Suspension .. 650 milliGRAM(s) Oral every 6 hours PRN Temp greater or equal to 38C (100.4F)  oxyCODONE    IR 15 milliGRAM(s) Oral four times a day PRN Moderate Pain (4 - 6)

## 2019-03-08 NOTE — PROGRESS NOTE ADULT - ASSESSMENT
63/M with ESRD on HD MWF, AVR, low back pain, recent AVF infection, chronic back pain presents with weakness and inability to walk.  Hypercapnic respiratory failure - pCO2 -90, intubated, off Versed since 3 pm day#3 now, unresponsive.    Septic shock - source - femoral Dago vs AVF area or endocarditis  - BCX from Dago cath  neg  - sono of the LEft ligated AVF neg for collection  - Gallium Scan neg (some uptake in Rt arm where the new AVF is, but clinically no redness or infection noted)   will need a PRETTY  EXIT SITE CX in JAn 2019 was Pseudomonas - Cx on file, BC x was neg.  PLAN FOR HD today and D/C Rt femoral Dago cath (Dr Baig was called) since pt is still spiking to 103  Will need [facility] for Monday  Currently on Cipro    ESRD HD for 3 hrs  2 K bath  UF  1 l As kerline    Hypercalcemia 2 to secondary hyperparathyroidism -  phos was high, normal now  iPTH 301  cont REnagel 800 mg 2 tabs po with meals tid    CXR reviewed - b/l pl effusions and PVC, s/p thoracentesis - no empyema  CHF - very high BNP    MS - off sedation, min MS  Please do Head CT    D/W wife and HS    Prognosis guarded

## 2019-03-08 NOTE — CONSULT NOTE ADULT - PROVIDER SPECIALTY LIST ADULT
Cardiology
Critical Care
Nephrology
Nutrition Support
Podiatry
Pulmonology
Surgery
Surgery
Infectious Disease

## 2019-03-08 NOTE — PROGRESS NOTE ADULT - ASSESSMENT
IMP:  - acute hypercapnic resp failure  - sepsis + pressors, as yet without clear source  - ESRD on HD  - protein deficit - note low BUN r/t creat - pt clinically not malnourished, but he is "behind" and at high risk  - hypercalcemia r/t hyperPTH    - cont Nepro 45 ml/h  - must GIVE all 6 packets Beneprotein daily  - must also DOCUMENT that all 6 packets of Beneprotein given daily  d/w medical resident

## 2019-03-08 NOTE — PROGRESS NOTE ADULT - PROBLEM SELECTOR PROBLEM 1
Acute respiratory failure
SOB (shortness of breath)
Sepsis, due to unspecified organism
Acute respiratory failure

## 2019-03-08 NOTE — CONSULT NOTE ADULT - SUBJECTIVE AND OBJECTIVE BOX
ANNIE ARGUETA [id number]  63y Male presented to the ER due to worsening shortness of breath on 3/01. Notes that he was too sick to go for his scheduled dialysis that day. States he does not make urine. Patient was hypotensive in the ER but improved with IV fluids. Denies fevers but reports he had been IV antibiotics for 1 year previously for infection involving left upper extremity Adds that he gets side effects from penicillin derivatives. Was evaluated by nephrology today, concern for catheter associated infection despite negative blood cultures, surgical consult was placed for removal of dialysis catheter       PAST MEDICAL & SURGICAL HISTORY:  Bleeding ulcer  Dialysis patient  CKD (chronic kidney disease)  H/O aortic valve replacement        MEDICATIONS  (STANDING):  calcium carbonate    500 mG (Tums) Chewable 1 Tablet(s) Chew three times a day  chlorhexidine 0.12% Liquid 15 milliLiter(s) Oral Mucosa two times a day  chlorhexidine 4% Liquid 1 Application(s) Topical <User Schedule>  ciprofloxacin   IVPB      ciprofloxacin   IVPB 400 milliGRAM(s) IV Intermittent every 24 hours  heparin  Injectable 5000 Unit(s) SubCutaneous every 12 hours  midodrine 10 milliGRAM(s) Oral three times a day  norepinephrine Infusion 0.05 MICROgram(s)/kG/Min (4.669 mL/Hr) IV Continuous <Continuous>  pantoprazole   Suspension 40 milliGRAM(s) Oral daily  sevelamer hydrochloride 1600 milliGRAM(s) Oral three times a day with meals    MEDICATIONS  (PRN):  acetaminophen    Suspension .. 650 milliGRAM(s) Oral every 6 hours PRN Temp greater or equal to 38C (100.4F)  oxyCODONE    IR 15 milliGRAM(s) Oral four times a day PRN Moderate Pain (4 - 6)      Allergies    Gluten (Unknown)  naproxen (Other)  penicillin (Rash)    Intolerances        REVIEW OF SYSTEMS    [ ] A ten-point review of systems was otherwise negative except as noted.  [X] Due to altered mental status/intubation, subjective information were not able to be obtained from the patient. History was obtained, to the extent possible, from review of the chart and collateral sources of information.      Vital Signs Last 24 Hrs  T(C): 38 (08 Mar 2019 15:00), Max: 39.6 (08 Mar 2019 06:30)  T(F): 100.4 (08 Mar 2019 15:00), Max: 103.3 (08 Mar 2019 06:30)  HR: 93 (08 Mar 2019 16:01) (83 - 109)  BP: --  BP(mean): --  RR: 57 (08 Mar 2019 15:00) (24 - 103)  SpO2: 98% (08 Mar 2019 16:01) (97% - 100%)    PHYSICAL EXAM:  GENERAL:  intubated sedated  CHEST/LUNG: b/l breaht sounds  HEART: Regular rate and rhythm  ABDOMEN: Soft, Nontender, Nondistended;       LABS:  Labs:  CAPILLARY BLOOD GLUCOSE                              13.0   9.59  )-----------( 99       ( 08 Mar 2019 05:44 )             41.1       Auto Neutrophil %: 74.2 % (03-08-19 @ 05:44)  Auto Immature Granulocyte %: 0.3 % (03-08-19 @ 05:44)    03-08    142  |  104  |  49<H>  ----------------------------<  137<H>  4.3   |  28  |  5.8<HH>      Calcium, Total Serum: 10.0 mg/dL (03-08-19 @ 05:44)      LFTs:             5.2  | 0.7  | 12       ------------------[85      ( 08 Mar 2019 05:44 )  2.5  | x    | 7           Lipase:x      Amylase:x         Blood Gas Arterial, Lactate: 1.3 mmoL/L (03-08-19 @ 05:49)  Blood Gas Arterial, Lactate: 1.1 mmoL/L (03-07-19 @ 17:36)  Blood Gas Arterial, Lactate: 1.3 mmoL/L (03-07-19 @ 05:12)  Blood Gas Arterial, Lactate: 1.3 mmoL/L (03-06-19 @ 17:22)  Blood Gas Arterial, Lactate: 1.6 mmoL/L (03-06-19 @ 06:26)    ABG - ( 08 Mar 2019 05:49 )  pH: 7.45  /  pCO2: 41    /  pO2: 112   / HCO3: 28    / Base Excess: 4.1   /  SaO2: 99              ABG - ( 07 Mar 2019 17:36 )  pH: 7.43  /  pCO2: 43    /  pO2: 116   / HCO3: 28    / Base Excess: 3.7   /  SaO2: 99              ABG - ( 07 Mar 2019 05:12 )  pH: 7.36  /  pCO2: 44    /  pO2: 131   / HCO3: 25    / Base Excess: -0.9  /  SaO2: 99            Serum Pro-Brain Natriuretic Peptide: >03880 pg/mL (03-01-19 @ 18:10)

## 2019-03-08 NOTE — CONSULT NOTE ADULT - CONSULT REASON
ESRD
Foot care
Lt Arm Edema.
Removal of dialysis catheter
Sepsis
enteral feeding
murmur
renal failure   sepsis
hypotension

## 2019-03-08 NOTE — PROGRESS NOTE ADULT - SUBJECTIVE AND OBJECTIVE BOX
64 yo male adm 3/1 c/o SOB and weakness.  pt remains intubated, off sedation, does not follow commands and not alert, but moves extremities (julio feet) and moves when examined  abd soft, ND, NT, obese  R sc TLC - c.d.i  R femoral Dago in place, L femoral A line  T(F): 103.3 (08 Mar 2019 07:10), Max: 103.3 (08 Mar 2019 06:30)  HR: 97 (08 Mar 2019 07:43) (88 - 109)  BP: 94/60 (07 Mar 2019 11:06) (94/60 - 94/60)  RR: 24 (08 Mar 2019 07:10) (24 - 95)  SpO2: 97% (08 Mar 2019 07:43) (97% - 100%)  Height (cm): 185.42 (02 Mar 2019 01:04)  Weight (kg): 99.6 (02 Mar 2019 01:04)   -----    pt s/p HD and IV fluids etc - need daily weight  BMI (kg/m2): 29 (02 Mar 2019 01:04)  BSA (m2): 2.24 (02 Mar 2019 01:04)    MEDICATIONS  (STANDING):  calcium carbonate    500 mG (Tums) Chewable 1 Tablet(s) Chew three times a day  chlorhexidine 0.12% Liquid 15 milliLiter(s) Oral Mucosa two times a day  chlorhexidine 4% Liquid 1 Application(s) Topical <User Schedule>  ciprofloxacin   IVPB 400 milliGRAM(s) IV Intermittent every 24 hours  heparin  Injectable 5000 Unit(s) SubCutaneous every 12 hours  midodrine 10 milliGRAM(s) Oral three times a day  norepinephrine Infusion 0.05 MICROgram(s)/kG/Min (4.669 mL/Hr) IV Continuous <Continuous>  pantoprazole   Suspension 40 milliGRAM(s) Oral daily  sevelamer hydrochloride 1600 milliGRAM(s) Oral three times a day with meals  Nepro via [place] 45 ml/h - no water flushes via pump  Beneprotein 4 packets documented as given on 3/7 - - - no other doses documented                        13.0   9.59  )-----------( 99       ( 08 Mar 2019 05:44 )             41.1   03-08    142  |  104  |  49<H>  ----------------------------<  137<H>  4.3   |  28  |  5.8<HH>    Ca    10.0      08 Mar 2019 05:44  Phos  3.4     03-08  Mg     2.1     03-08    TPro  5.2<L>  /  Alb  2.5<L>  /  TBili  0.7  /  DBili  x   /  AST  12  /  ALT  7   /  AlkPhos  85  03-08

## 2019-03-09 VITALS — HEART RATE: 94 BPM | RESPIRATION RATE: 110 BRPM

## 2019-03-09 LAB
ALBUMIN SERPL ELPH-MCNC: 2.9 G/DL — LOW (ref 3.5–5.2)
ALP SERPL-CCNC: 111 U/L — SIGNIFICANT CHANGE UP (ref 30–115)
ALT FLD-CCNC: 11 U/L — SIGNIFICANT CHANGE UP (ref 0–41)
ANION GAP SERPL CALC-SCNC: 12 MMOL/L — SIGNIFICANT CHANGE UP (ref 7–14)
APTT BLD: 34.3 SEC — SIGNIFICANT CHANGE UP (ref 27–39.2)
AST SERPL-CCNC: 28 U/L — SIGNIFICANT CHANGE UP (ref 0–41)
BASOPHILS # BLD AUTO: 0.04 K/UL — SIGNIFICANT CHANGE UP (ref 0–0.2)
BASOPHILS NFR BLD AUTO: 0.4 % — SIGNIFICANT CHANGE UP (ref 0–1)
BILIRUB SERPL-MCNC: 0.5 MG/DL — SIGNIFICANT CHANGE UP (ref 0.2–1.2)
BUN SERPL-MCNC: 47 MG/DL — HIGH (ref 10–20)
CALCIUM SERPL-MCNC: 10.4 MG/DL — HIGH (ref 8.5–10.1)
CHLORIDE SERPL-SCNC: 101 MMOL/L — SIGNIFICANT CHANGE UP (ref 98–110)
CO2 SERPL-SCNC: 29 MMOL/L — SIGNIFICANT CHANGE UP (ref 17–32)
CREAT SERPL-MCNC: 4.6 MG/DL — CRITICAL HIGH (ref 0.7–1.5)
EOSINOPHIL # BLD AUTO: 0.1 K/UL — SIGNIFICANT CHANGE UP (ref 0–0.7)
EOSINOPHIL NFR BLD AUTO: 0.9 % — SIGNIFICANT CHANGE UP (ref 0–8)
GLUCOSE SERPL-MCNC: 149 MG/DL — HIGH (ref 70–99)
HCT VFR BLD CALC: 43.3 % — SIGNIFICANT CHANGE UP (ref 42–52)
HGB BLD-MCNC: 13.2 G/DL — LOW (ref 14–18)
IMM GRANULOCYTES NFR BLD AUTO: 0.4 % — HIGH (ref 0.1–0.3)
INR BLD: 1.48 RATIO — HIGH (ref 0.65–1.3)
LYMPHOCYTES # BLD AUTO: 1.1 K/UL — LOW (ref 1.2–3.4)
LYMPHOCYTES # BLD AUTO: 9.7 % — LOW (ref 20.5–51.1)
MAGNESIUM SERPL-MCNC: 2.3 MG/DL — SIGNIFICANT CHANGE UP (ref 1.8–2.4)
MCHC RBC-ENTMCNC: 30.5 G/DL — LOW (ref 32–37)
MCHC RBC-ENTMCNC: 30.8 PG — SIGNIFICANT CHANGE UP (ref 27–31)
MCV RBC AUTO: 100.9 FL — HIGH (ref 80–94)
MONOCYTES # BLD AUTO: 1.47 K/UL — HIGH (ref 0.1–0.6)
MONOCYTES NFR BLD AUTO: 13 % — HIGH (ref 1.7–9.3)
NEUTROPHILS # BLD AUTO: 8.55 K/UL — HIGH (ref 1.4–6.5)
NEUTROPHILS NFR BLD AUTO: 75.6 % — HIGH (ref 42.2–75.2)
NRBC # BLD: 0 /100 WBCS — SIGNIFICANT CHANGE UP (ref 0–0)
PHOSPHATE SERPL-MCNC: 3.3 MG/DL — SIGNIFICANT CHANGE UP (ref 2.1–4.9)
PLATELET # BLD AUTO: 138 K/UL — SIGNIFICANT CHANGE UP (ref 130–400)
POTASSIUM SERPL-MCNC: 4.7 MMOL/L — SIGNIFICANT CHANGE UP (ref 3.5–5)
POTASSIUM SERPL-SCNC: 4.7 MMOL/L — SIGNIFICANT CHANGE UP (ref 3.5–5)
PROT SERPL-MCNC: 5.7 G/DL — LOW (ref 6–8)
PROTHROM AB SERPL-ACNC: 16.9 SEC — HIGH (ref 9.95–12.87)
RBC # BLD: 4.29 M/UL — LOW (ref 4.7–6.1)
RBC # FLD: 15.5 % — HIGH (ref 11.5–14.5)
SODIUM SERPL-SCNC: 142 MMOL/L — SIGNIFICANT CHANGE UP (ref 135–146)
WBC # BLD: 11.3 K/UL — HIGH (ref 4.8–10.8)
WBC # FLD AUTO: 11.3 K/UL — HIGH (ref 4.8–10.8)

## 2019-03-09 RX ORDER — METOPROLOL TARTRATE 50 MG
2.5 TABLET ORAL ONCE
Qty: 0 | Refills: 0 | Status: COMPLETED | OUTPATIENT
Start: 2019-03-09 | End: 2019-03-09

## 2019-03-09 RX ORDER — SODIUM CHLORIDE 9 MG/ML
500 INJECTION INTRAMUSCULAR; INTRAVENOUS; SUBCUTANEOUS ONCE
Qty: 0 | Refills: 0 | Status: COMPLETED | OUTPATIENT
Start: 2019-03-09 | End: 2019-03-09

## 2019-03-09 RX ORDER — VASOPRESSIN 20 [USP'U]/ML
0.04 INJECTION INTRAVENOUS
Qty: 100 | Refills: 0 | Status: DISCONTINUED | OUTPATIENT
Start: 2019-03-09 | End: 2019-03-09

## 2019-03-09 RX ORDER — IBUPROFEN 200 MG
400 TABLET ORAL ONCE
Qty: 0 | Refills: 0 | Status: DISCONTINUED | OUTPATIENT
Start: 2019-03-09 | End: 2019-03-09

## 2019-03-09 RX ORDER — SODIUM CHLORIDE 9 MG/ML
1000 INJECTION INTRAMUSCULAR; INTRAVENOUS; SUBCUTANEOUS ONCE
Qty: 0 | Refills: 0 | Status: DISCONTINUED | OUTPATIENT
Start: 2019-03-09 | End: 2019-03-09

## 2019-03-09 RX ADMIN — Medication 650 MILLIGRAM(S): at 01:41

## 2019-03-09 RX ADMIN — Medication 1 TABLET(S): at 05:15

## 2019-03-09 RX ADMIN — MIDODRINE HYDROCHLORIDE 10 MILLIGRAM(S): 2.5 TABLET ORAL at 05:14

## 2019-03-09 RX ADMIN — Medication 650 MILLIGRAM(S): at 09:23

## 2019-03-09 RX ADMIN — Medication 650 MILLIGRAM(S): at 04:06

## 2019-03-09 RX ADMIN — Medication 2.5 MILLIGRAM(S): at 03:27

## 2019-03-09 RX ADMIN — CHLORHEXIDINE GLUCONATE 1 APPLICATION(S): 213 SOLUTION TOPICAL at 05:15

## 2019-03-09 RX ADMIN — HEPARIN SODIUM 5000 UNIT(S): 5000 INJECTION INTRAVENOUS; SUBCUTANEOUS at 05:15

## 2019-03-09 RX ADMIN — Medication 650 MILLIGRAM(S): at 12:44

## 2019-03-09 RX ADMIN — CHLORHEXIDINE GLUCONATE 15 MILLILITER(S): 213 SOLUTION TOPICAL at 05:14

## 2019-03-09 RX ADMIN — SODIUM CHLORIDE 1000 MILLILITER(S): 9 INJECTION INTRAMUSCULAR; INTRAVENOUS; SUBCUTANEOUS at 04:15

## 2019-03-09 RX ADMIN — Medication 2.5 MILLIGRAM(S): at 05:38

## 2019-03-09 RX ADMIN — SEVELAMER CARBONATE 1600 MILLIGRAM(S): 2400 POWDER, FOR SUSPENSION ORAL at 09:03

## 2019-03-09 NOTE — PROGRESS NOTE ADULT - ASSESSMENT
Off sedation, Not awake yet. On  pressors, Gallium neg support                                 =======================================================================================================

## 2019-03-09 NOTE — DISCHARGE NOTE FOR THE EXPIRED PATIENT - HOSPITAL COURSE
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 He underwent 1 session of hemodialysis without improvement. He then developed acute hypercapnic respiratory failure and was intubated. He was found to be in septic shock requiring pressors. He was started on Vancomycin and Cefepime. Previous pseudomonas blood cultures and sensitivities were obtained from previous AVF infection and were sensitive to Cefepime. Blood cultures were negative on multiple occasions. TTE showed no vegetation. Because of persistent fevers, Gallium scan was obtained and did not show any focus of infection.  During stay, patient was evaluated by ID, Cardiology and was under the care of pulm/CC team.   On 3/9/2019, patient became hypotensive despite being on pressors and his blood pressure didn't improve with increasing doses of Levophed. He went into cardiac arrest and CPR was initiated at 11:24AM in the presence of the intensivist and hospitalist. ROSC was not obtained and patient  at 11:55AM

## 2019-03-09 NOTE — PROGRESS NOTE ADULT - REASON FOR ADMISSION
Respiratory failure
Respiratory failure / septic shock
Respiratory failure / septic shock
SOB / respiratory failure
Fevers
Missed HD
SOB
SOB
SOB / respiratory failure
sob
weakness, hypotension
SOB
Weakness, shortness of breath.

## 2019-03-09 NOTE — PROGRESS NOTE ADULT - SUBJECTIVE AND OBJECTIVE BOX
63y Male presented with weakness, currently admitted to CCU intubated and on pressers     PAST MEDICAL & SURGICAL HISTORY:  Bleeding ulcer  Dialysis patient  CKD (chronic kidney disease)  H/O aortic valve replacement      Hospital Day: 9    Events of the Last 24h: Surgical team consulted for removal of right groin jax catheter    Subjective: N/A -  Intubated and sedated    Vital Signs Last 24 Hrs  T(C): 38.6 (09 Mar 2019 07:05), Max: 38.9 (09 Mar 2019 04:00)  T(F): 101.4 (09 Mar 2019 07:05), Max: 102 (09 Mar 2019 04:00)  HR: 127 (09 Mar 2019 07:00) (83 - 140)  BP: --  BP(mean): --  RR: 25 (09 Mar 2019 07:05) (22 - 129)  SpO2: 96% (09 Mar 2019 07:00) (93% - 99%)    Mode: Auto Mode: PRVC/ Volume Support, RR (machine): 24, TV (machine): 500, FiO2: 40, PEEP: 5, MAP: 11, PIP: 21  I&O's Detail    08 Mar 2019 07:01  -  09 Mar 2019 07:00  --------------------------------------------------------  IN:    Nepro: 720 mL    norepinephrine Infusion: 642.5 mL  Total IN: 1362.5 mL    OUT:    Other: 1000 mL  Total OUT: 1000 mL    Total NET: 362.5 mL          PHYSICAL EXAM:    GENERAL: intubated, sedated, appears comfortable    HEENT: NCAT, ET in place    CHEST/LUNGS: Intubated, b/l breath sounds    HEART: tachycardic, no appreciable murmurs    INCISION/WOUNDS: right groin jax catheter site dressed, no signs of bleeding    Diet, NPO with Tube Feed:   Tube Feeding Modality: Orogastric  Nepro with Carb Steady  Total Volume for 24 Hours (mL): 1080  Continuous  Until Goal Tube Feed Rate (mL per Hour): 45  Tube Feed Duration (in Hours): 24  Tube Feed Start Time: 11:10  Beneprotein ([facility] Only)     Qty per Day:  6 (19 @ 11:08)    MEDICATIONS  (STANDING):  calcium carbonate    500 mG (Tums) Chewable 1 Tablet(s) Chew three times a day  chlorhexidine 0.12% Liquid 15 milliLiter(s) Oral Mucosa two times a day  chlorhexidine 4% Liquid 1 Application(s) Topical <User Schedule>  ciprofloxacin   IVPB      ciprofloxacin   IVPB 400 milliGRAM(s) IV Intermittent every 24 hours  heparin  Injectable 5000 Unit(s) SubCutaneous every 12 hours  ibuprofen  Tablet. 400 milliGRAM(s) Oral once  midodrine 10 milliGRAM(s) Oral three times a day  norepinephrine Infusion 0.05 MICROgram(s)/kG/Min (4.669 mL/Hr) IV Continuous <Continuous>  pantoprazole   Suspension 40 milliGRAM(s) Oral daily  sevelamer hydrochloride 1600 milliGRAM(s) Oral three times a day with meals    MEDICATIONS  (PRN):  acetaminophen    Suspension .. 650 milliGRAM(s) Oral every 6 hours PRN Temp greater or equal to 38C (100.4F)                              13.2   11.30 )-----------( 138      ( 09 Mar 2019 05:53 )             43.3                       142   |  101   |  47                 Ca: 10.4   BMP:   ----------------------------< 149    M.3   (19 @ 05:53)             4.7    |  29    | 4.6                Ph: 3.3      LFT:     TPro: 5.7 / Alb: 2.9 / TBili: 0.5 / DBili: x / AST: 28 / ALT: 11 / AlkPhos: 111   (19 @ 05:53)    LFTs:             5.7  | 0.5  | 28       ------------------[111     ( 09 Mar 2019 05:53 )  2.9  | x    | 11              Coags:     16.90  ----< 1.48    ( 09 Mar 2019 05:53 )     34.3

## 2019-03-09 NOTE — PROGRESS NOTE ADULT - PROVIDER SPECIALTY LIST ADULT
Cardiology
Critical Care
Hospitalist
Infectious Disease
Internal Medicine
Nephrology
Nutrition Support
Pulmonology
Pulmonology
Surgery
Hospitalist

## 2019-03-09 NOTE — PROGRESS NOTE ADULT - SUBJECTIVE AND OBJECTIVE BOX
CARDIOLOGY CONSULT NOTE     CHIEF COMPLAINT/REASON FOR CONSULT:    HPI:  64yo male presents to the ER due to worsening shortness of breath. Notes that he was too sick to go for his scheduled dialysis today. States he does not make urine. Patient was hypotensive in the ER but improved with IV fluids. Denies fevers but reports he had been IV antibiotics for 1 year previously for infection involving left upper extremity Adds that he gets side effects from penicillin derivatives (01 Mar 2019 20:12)      PAST MEDICAL & SURGICAL HISTORY:  Bleeding ulcer  Dialysis patient  CKD (chronic kidney disease)  H/O aortic valve replacement      Cardiac Risks:   [ ]HTN, [ ] DM, [ ] Smoking, [ ] FH,  [ ] Lipids        MEDICATIONS:  MEDICATIONS  (STANDING):  calcium carbonate    500 mG (Tums) Chewable 1 Tablet(s) Chew three times a day  chlorhexidine 0.12% Liquid 15 milliLiter(s) Oral Mucosa two times a day  chlorhexidine 4% Liquid 1 Application(s) Topical <User Schedule>  ciprofloxacin   IVPB      ciprofloxacin   IVPB 400 milliGRAM(s) IV Intermittent every 24 hours  heparin  Injectable 5000 Unit(s) SubCutaneous every 12 hours  ibuprofen  Tablet. 400 milliGRAM(s) Oral once  midodrine 10 milliGRAM(s) Oral three times a day  norepinephrine Infusion 0.05 MICROgram(s)/kG/Min (4.669 mL/Hr) IV Continuous <Continuous>  pantoprazole   Suspension 40 milliGRAM(s) Oral daily  sevelamer hydrochloride 1600 milliGRAM(s) Oral three times a day with meals      FAMILY HISTORY:      SOCIAL HISTORY:      [ ] Marital status  Allergies    Gluten (Unknown)  naproxen (Other)  penicillin (Rash)    Intolerances    	    REVIEW OF SYSTEMS:  CONSTITUTIONAL: No fever, weight loss, or fatigue  EYES: No eye pain, visual disturbances, or discharge  ENMT:  No difficulty hearing, tinnitus, vertigo; No sinus or throat pain  NECK: No pain or stiffness  RESPIRATORY: No cough, wheezing, chills or hemoptysis; No Shortness of Breath  CARDIOVASCULAR: No chest pain, palpitations, passing out, dizziness, or leg swelling  GASTROINTESTINAL: No abdominal or epigastric pain. No nausea, vomiting, or hematemesis; No diarrhea or constipation. No melena or hematochezia.  GENITOURINARY: No dysuria, frequency, hematuria, or incontinence  NEUROLOGICAL: No headaches, memory loss, loss of strength, numbness, or tremors  SKIN: No itching, burning, rashes, or lesions   	    [ ] All others negative	  [ ] Unable to obtain    PHYSICAL EXAM:  T(C): 38.9 (03-09-19 @ 04:00), Max: 39.6 (03-08-19 @ 07:10)  HR: 118 (03-09-19 @ 06:00) (83 - 140)  BP: --  RR: 26 (03-09-19 @ 06:00) (22 - 129)  SpO2: 94% (03-09-19 @ 06:00) (93% - 99%)  Wt(kg): --  I&O's Summary    07 Mar 2019 07:01  -  08 Mar 2019 07:00  --------------------------------------------------------  IN: 2506 mL / OUT: 1000 mL / NET: 1506 mL    08 Mar 2019 07:01  -  09 Mar 2019 06:41  --------------------------------------------------------  IN: 1362.5 mL / OUT: 1000 mL / NET: 362.5 mL        Appearance: Normal	  Psychiatry: A & O x 3, Mood & affect appropriate  HEENT:   Normal oral mucosa, PERRL, EOMI	  Lymphatic: No lymphadenopathy  Cardiovascular: Normal S1 S2,RRR, No JVD, No murmurs  Respiratory: Lungs clear to auscultation	  Gastrointestinal:  Soft, Non-tender, + BS	  Skin: No rashes, No ecchymoses, No cyanosis	  Neurologic: Non-focal  Extremities: Normal range of motion, No clubbing, cyanosis or edema  Vascular: Peripheral pulses palpable 2+ bilaterally      ECG:  	    	  LABS:	 	    CARDIAC MARKERS:                                    13.0   9.59  )-----------( 99       ( 08 Mar 2019 05:44 )             41.1     03-08    142  |  104  |  49<H>  ----------------------------<  137<H>  4.3   |  28  |  5.8<HH>    Ca    10.0      08 Mar 2019 05:44  Phos  3.4     03-08  Mg     2.1     03-08    TPro  5.2<L>  /  Alb  2.5<L>  /  TBili  0.7  /  DBili  x   /  AST  12  /  ALT  7   /  AlkPhos  85  03-08      proBNP:   Lipid Profile:   HgA1c:   TSH:     IMP/PLAN:

## 2019-03-09 NOTE — DISCHARGE NOTE FOR THE EXPIRED PATIENT - OTHER SIGNIFICANT FINDINGS
On 3/9/2019, patient became hypotensive despite being on pressors and his blood pressure didn't improve with increasing doses of Levophed. He went into cardiac arrest and CPR was initiated at 11:24AM in the presence of the intensivist and hospitalist. ROSC was not obtained and patient  at 11:55AM  Pager No.  [phone]

## 2019-03-09 NOTE — PROGRESS NOTE ADULT - ASSESSMENT
63m admitted to CCU intubated and on pressors with unknown etiology    Right groin jax catheter removed at request of medicine and nephrology. Discusses options of placing temporary catheter at this time, Primary team requested placement of catheter on monday prior to dialysis.    Plan:  Will continue to follow  Placement of [place] catheter monday  Will likely need replacement of long term dialysis access with tesio and possible AVF when clinically stable

## 2019-03-09 NOTE — CHART NOTE - NSCHARTNOTEFT_GEN_A_CORE
ICU Attending  Patient has been in CCU for the last one week. Intubated , septic shock , ESRD-on HD. Patient has been critical on IV Levophed drip. Coded at 11:24 am. Had no pulse and wide complexes- PEA.  CPR started and patient given IV Medications. Was found in V Fib after approximately 5 minutes and defibillated twice. Received IV Amiodarone. Later had pulse for 3-4 minutes and again lost pulse as well as blood flow ( no sound with doppler). CPR started again with another code. No pulse. Had PEA , Asystole and PEA. In between his wife was informed, outside the room.  Pronounced  at 11:55 am. Wife and hospitalist-covering, informed.

## 2019-03-09 NOTE — PHARMACOTHERAPY INTERVENTION NOTE - COMMENTS
Patioent allergic to naprosyn. md will dc motrin order
Resident aware of PCN allergy-Pt has been on this.
The patient is currently receiving vancomycin post-HD. I recommended to obtain a random vancomycin level for monitoring.

## 2019-03-11 LAB
CULTURE RESULTS: SIGNIFICANT CHANGE UP
CULTURE RESULTS: SIGNIFICANT CHANGE UP
SPECIMEN SOURCE: SIGNIFICANT CHANGE UP
SPECIMEN SOURCE: SIGNIFICANT CHANGE UP

## 2019-06-06 ENCOUNTER — APPOINTMENT (OUTPATIENT)
Dept: CARDIOLOGY | Facility: CLINIC | Age: 64
End: 2019-06-06

## 2019-09-02 NOTE — PROCEDURAL SAFETY CHECKLIST WITH OR WITHOUT SEDATION - NSPREIMAGEREVIEW_GEN_ALL_CORE
not applicable
GOAL: Pt. will ambulate 50 feet with rolling walker with min assist in 2 weeks

## 2021-07-14 NOTE — AIRWAY PLACEMENT NOTE ADULT - AIRWAY TUBE REFERENCE POINT
secure and patent/lip line at 23/tube malhotra/lip Patient requests all Lab, Cardiology, and Radiology Results on their Discharge Instructions

## 2021-10-06 PROBLEM — I10 ESSENTIAL HYPERTENSION: Status: ACTIVE | Noted: 2019-02-19

## 2022-10-12 NOTE — ED ADULT TRIAGE NOTE - HEIGHT IN CM
185.42 Transposition Flap Text: The defect edges were debeveled with a #15 scalpel blade.  Given the location of the defect and the proximity to free margins a transposition flap was deemed most appropriate.  Using a sterile surgical marker, an appropriate transposition flap was drawn incorporating the defect.    The area thus outlined was incised deep to adipose tissue with a #15 scalpel blade.  The skin margins were undermined to an appropriate distance in all directions utilizing iris scissors.

## 2022-11-23 NOTE — PROGRESS NOTE ADULT - SUBJECTIVE AND OBJECTIVE BOX
ANNIE ARGUETA  63y, Male      OVERNIGHT EVENTS:  intubated  hypotensive    ROS negative except as per above    VITALS:  T(F): 96.8, Max: 97.1 (03-02-19 @ 19:01)  HR: 82  BP: 93/55  RR: 20Vital Signs Last 24 Hrs  T(C): 36 (03 Mar 2019 15:01), Max: 36.2 (02 Mar 2019 19:01)  T(F): 96.8 (03 Mar 2019 15:01), Max: 97.1 (02 Mar 2019 19:01)  HR: 82 (03 Mar 2019 15:10) (76 - 100)  BP: 93/55 (03 Mar 2019 15:10) (89/53 - 115/56)  BP(mean): 69 (03 Mar 2019 15:10) (66 - 81)  RR: 20 (03 Mar 2019 15:10) (16 - 58)  SpO2: 97% (03 Mar 2019 15:10) (90% - 100%)    PHYSICAL EXAM:  Gen: intubated  HEENT: NCAT. EOMI. MMM.   Neck: Supple, no cervical LAD  CV: RRR, no murmurs  Lungs: CTAB, no w/r/r  Abd: Soft. NTND  Extr: LUE scar from AVF c/d/i no fluctuance  Skin: no rash  Neuro: sedated  Lines: clean    TESTS & MEASUREMENTS:                        14.3   9.72  )-----------( 136      ( 03 Mar 2019 06:06 )             48.9     03-03    140  |  99  |  16  ----------------------------<  102<H>  4.3   |  26  |  5.7<HH>    Ca    10.1      03 Mar 2019 06:06  Phos  7.6     03-03  Mg     2.2     03-03    TPro  6.8  /  Alb  3.9  /  TBili  0.5  /  DBili  x   /  AST  12  /  ALT  6   /  AlkPhos  112  03-03    LIVER FUNCTIONS - ( 03 Mar 2019 06:06 )  Alb: 3.9 g/dL / Pro: 6.8 g/dL / ALK PHOS: 112 U/L / ALT: 6 U/L / AST: 12 U/L / GGT: x                 RADIOLOGY & ADDITIONAL TESTS:    ANTIBIOTICS:  gentamicin   IVPB   200 mL/Hr IV Intermittent (03-02-19 @ 01:54)    vancomycin  IVPB   250 mL/Hr IV Intermittent (03-02-19 @ 01:54) Klisyri Counseling:  I discussed with the patient the risks of Klisyri including but not limited to erythema, scaling, itching, weeping, crusting, and pain.
